# Patient Record
Sex: FEMALE | Race: OTHER | HISPANIC OR LATINO | URBAN - METROPOLITAN AREA
[De-identification: names, ages, dates, MRNs, and addresses within clinical notes are randomized per-mention and may not be internally consistent; named-entity substitution may affect disease eponyms.]

---

## 2018-04-26 VITALS
TEMPERATURE: 98 F | HEART RATE: 64 BPM | OXYGEN SATURATION: 97 % | SYSTOLIC BLOOD PRESSURE: 156 MMHG | RESPIRATION RATE: 18 BRPM | DIASTOLIC BLOOD PRESSURE: 56 MMHG

## 2018-04-26 NOTE — ED PROVIDER NOTE - MEDICAL DECISION MAKING DETAILS
67F presenting with slurred speech and left sided weakness and numbness since yesterday 8pm, will get basics, ekg, CT head and neuro consult, likely MRI/ CDU vs. admit.

## 2018-04-26 NOTE — ED PROVIDER NOTE - OBJECTIVE STATEMENT
67F presenting with left sided weakness and numbness since last night 8pm. Daughter who helped translate for the patient stated that pt was slurring speech when she called her mother at 8pm today. Per patient, sx started since yesterday night. Endorsed to heavy tongue, left sided facial and LE and UE numbness with drooling. Sxs have improved since yesterday but still has weakness. No fever or chills, no headache, blurry vision, no change in mental status.

## 2018-04-26 NOTE — ED PROVIDER NOTE - PROGRESS NOTE DETAILS
Ariana Velazquez MD PGY4  I was called to evaluate patient for stroke. hx of HTN, DM on ASA p/w left sided numbness since yesterday at 8pm. patient noted to have decreased sensation over left upper and lower extremity and left face. Noted to have slight left lower facial weakness. 5/5 strength in upper and lower extermities. Patient stable and mentating well. Episode occurred approx 24 hours ago and out of window. Code stroke was not called Neuro aware.

## 2018-04-26 NOTE — ED PROVIDER NOTE - PHYSICAL EXAMINATION
Focal weakness in the left UE and LE  Mild pronator drift on the left  No nystagmus, finger to nose in tact.

## 2018-04-26 NOTE — ED ADULT TRIAGE NOTE - CHIEF COMPLAINT QUOTE
pt. c/o L sided body weakness since about 8pm last night, reports decreased sensation to the SUDHA and LL extremities, as per daughter, pt.'s speech was slurred at approximately 8pm tonight, has since resolved.  Stroke eval conducted by MD Velazquez, no code stroke called.  PMHx HTN, diverticulitis, DM ().

## 2018-04-26 NOTE — ED PROVIDER NOTE - ATTENDING CONTRIBUTION TO CARE
Locurto  pt with c/o left sided weakness/numbness which began last night  persisting today though not as  severe today  Has also noted intermittent slurring of speech today  no assoc CP or SOB  had mild HA   exam  pt alert fluent speech  mild drift LUE and LLE  decreased tactile sensation on left  able to perform cerebeallar b/l no gross visual field defect CN  intact  plan CT head neuro to see   Presently not TPA (sxs improving  and about 1 day from onset)

## 2018-04-27 ENCOUNTER — INPATIENT (INPATIENT)
Facility: HOSPITAL | Age: 68
LOS: 10 days | Discharge: NURSING, REHAB OR LONG-TERM CARE | End: 2018-05-08
Attending: HOSPITALIST | Admitting: HOSPITALIST
Payer: MEDICAID

## 2018-04-27 DIAGNOSIS — E11.9 TYPE 2 DIABETES MELLITUS WITHOUT COMPLICATIONS: ICD-10-CM

## 2018-04-27 DIAGNOSIS — F03.90 UNSPECIFIED DEMENTIA WITHOUT BEHAVIORAL DISTURBANCE: ICD-10-CM

## 2018-04-27 DIAGNOSIS — I10 ESSENTIAL (PRIMARY) HYPERTENSION: ICD-10-CM

## 2018-04-27 DIAGNOSIS — Z29.9 ENCOUNTER FOR PROPHYLACTIC MEASURES, UNSPECIFIED: ICD-10-CM

## 2018-04-27 DIAGNOSIS — F32.9 MAJOR DEPRESSIVE DISORDER, SINGLE EPISODE, UNSPECIFIED: ICD-10-CM

## 2018-04-27 DIAGNOSIS — R53.1 WEAKNESS: ICD-10-CM

## 2018-04-27 DIAGNOSIS — Z90.49 ACQUIRED ABSENCE OF OTHER SPECIFIED PARTS OF DIGESTIVE TRACT: Chronic | ICD-10-CM

## 2018-04-27 LAB
ALBUMIN SERPL ELPH-MCNC: 3.4 G/DL — SIGNIFICANT CHANGE UP (ref 3.3–5)
ALP SERPL-CCNC: 164 U/L — HIGH (ref 40–120)
ALT FLD-CCNC: 15 U/L — SIGNIFICANT CHANGE UP (ref 4–33)
APTT BLD: 28.4 SEC — SIGNIFICANT CHANGE UP (ref 27.5–37.4)
AST SERPL-CCNC: 24 U/L — SIGNIFICANT CHANGE UP (ref 4–32)
BASOPHILS # BLD AUTO: 0.12 K/UL — SIGNIFICANT CHANGE UP (ref 0–0.2)
BASOPHILS # BLD AUTO: 0.16 K/UL — SIGNIFICANT CHANGE UP (ref 0–0.2)
BASOPHILS NFR BLD AUTO: 1.3 % — SIGNIFICANT CHANGE UP (ref 0–2)
BASOPHILS NFR BLD AUTO: 1.5 % — SIGNIFICANT CHANGE UP (ref 0–2)
BILIRUB SERPL-MCNC: 0.4 MG/DL — SIGNIFICANT CHANGE UP (ref 0.2–1.2)
BUN SERPL-MCNC: 10 MG/DL — SIGNIFICANT CHANGE UP (ref 7–23)
BUN SERPL-MCNC: 12 MG/DL — SIGNIFICANT CHANGE UP (ref 7–23)
CALCIUM SERPL-MCNC: 9 MG/DL — SIGNIFICANT CHANGE UP (ref 8.4–10.5)
CALCIUM SERPL-MCNC: 9.1 MG/DL — SIGNIFICANT CHANGE UP (ref 8.4–10.5)
CHLORIDE SERPL-SCNC: 93 MMOL/L — LOW (ref 98–107)
CHLORIDE SERPL-SCNC: 96 MMOL/L — LOW (ref 98–107)
CHOLEST SERPL-MCNC: 140 MG/DL — SIGNIFICANT CHANGE UP (ref 120–199)
CK MB BLD-MCNC: 1.78 NG/ML — SIGNIFICANT CHANGE UP (ref 1–4.7)
CK MB BLD-MCNC: SIGNIFICANT CHANGE UP (ref 0–2.5)
CK SERPL-CCNC: 38 U/L — SIGNIFICANT CHANGE UP (ref 25–170)
CO2 SERPL-SCNC: 26 MMOL/L — SIGNIFICANT CHANGE UP (ref 22–31)
CO2 SERPL-SCNC: 30 MMOL/L — SIGNIFICANT CHANGE UP (ref 22–31)
CREAT SERPL-MCNC: 0.76 MG/DL — SIGNIFICANT CHANGE UP (ref 0.5–1.3)
CREAT SERPL-MCNC: 0.82 MG/DL — SIGNIFICANT CHANGE UP (ref 0.5–1.3)
EOSINOPHIL # BLD AUTO: 1.17 K/UL — HIGH (ref 0–0.5)
EOSINOPHIL # BLD AUTO: 1.26 K/UL — HIGH (ref 0–0.5)
EOSINOPHIL NFR BLD AUTO: 11.6 % — HIGH (ref 0–6)
EOSINOPHIL NFR BLD AUTO: 12.9 % — HIGH (ref 0–6)
GLUCOSE SERPL-MCNC: 186 MG/DL — HIGH (ref 70–99)
GLUCOSE SERPL-MCNC: 232 MG/DL — HIGH (ref 70–99)
HBA1C BLD-MCNC: 9.1 % — HIGH (ref 4–5.6)
HCT VFR BLD CALC: 44 % — SIGNIFICANT CHANGE UP (ref 34.5–45)
HCT VFR BLD CALC: 45 % — SIGNIFICANT CHANGE UP (ref 34.5–45)
HDLC SERPL-MCNC: 37 MG/DL — LOW (ref 45–65)
HGB BLD-MCNC: 14.7 G/DL — SIGNIFICANT CHANGE UP (ref 11.5–15.5)
HGB BLD-MCNC: 15 G/DL — SIGNIFICANT CHANGE UP (ref 11.5–15.5)
IMM GRANULOCYTES # BLD AUTO: 0.02 # — SIGNIFICANT CHANGE UP
IMM GRANULOCYTES # BLD AUTO: 0.02 # — SIGNIFICANT CHANGE UP
IMM GRANULOCYTES NFR BLD AUTO: 0.2 % — SIGNIFICANT CHANGE UP (ref 0–1.5)
IMM GRANULOCYTES NFR BLD AUTO: 0.2 % — SIGNIFICANT CHANGE UP (ref 0–1.5)
INR BLD: 1.01 — SIGNIFICANT CHANGE UP (ref 0.88–1.17)
LIPID PNL WITH DIRECT LDL SERPL: 87 MG/DL — SIGNIFICANT CHANGE UP
LYMPHOCYTES # BLD AUTO: 29.8 % — SIGNIFICANT CHANGE UP (ref 13–44)
LYMPHOCYTES # BLD AUTO: 3.03 K/UL — SIGNIFICANT CHANGE UP (ref 1–3.3)
LYMPHOCYTES # BLD AUTO: 3.25 K/UL — SIGNIFICANT CHANGE UP (ref 1–3.3)
LYMPHOCYTES # BLD AUTO: 33.4 % — SIGNIFICANT CHANGE UP (ref 13–44)
MCHC RBC-ENTMCNC: 28.9 PG — SIGNIFICANT CHANGE UP (ref 27–34)
MCHC RBC-ENTMCNC: 28.9 PG — SIGNIFICANT CHANGE UP (ref 27–34)
MCHC RBC-ENTMCNC: 33.3 % — SIGNIFICANT CHANGE UP (ref 32–36)
MCHC RBC-ENTMCNC: 33.4 % — SIGNIFICANT CHANGE UP (ref 32–36)
MCV RBC AUTO: 86.4 FL — SIGNIFICANT CHANGE UP (ref 80–100)
MCV RBC AUTO: 86.7 FL — SIGNIFICANT CHANGE UP (ref 80–100)
MONOCYTES # BLD AUTO: 0.66 K/UL — SIGNIFICANT CHANGE UP (ref 0–0.9)
MONOCYTES # BLD AUTO: 1.07 K/UL — HIGH (ref 0–0.9)
MONOCYTES NFR BLD AUTO: 7.3 % — SIGNIFICANT CHANGE UP (ref 2–14)
MONOCYTES NFR BLD AUTO: 9.8 % — SIGNIFICANT CHANGE UP (ref 2–14)
NEUTROPHILS # BLD AUTO: 4.08 K/UL — SIGNIFICANT CHANGE UP (ref 1.8–7.4)
NEUTROPHILS # BLD AUTO: 5.13 K/UL — SIGNIFICANT CHANGE UP (ref 1.8–7.4)
NEUTROPHILS NFR BLD AUTO: 44.9 % — SIGNIFICANT CHANGE UP (ref 43–77)
NEUTROPHILS NFR BLD AUTO: 47.1 % — SIGNIFICANT CHANGE UP (ref 43–77)
NRBC # FLD: 0 — SIGNIFICANT CHANGE UP
NRBC # FLD: 0 — SIGNIFICANT CHANGE UP
PLATELET # BLD AUTO: 187 K/UL — SIGNIFICANT CHANGE UP (ref 150–400)
PLATELET # BLD AUTO: 194 K/UL — SIGNIFICANT CHANGE UP (ref 150–400)
PMV BLD: 10.5 FL — SIGNIFICANT CHANGE UP (ref 7–13)
PMV BLD: 10.5 FL — SIGNIFICANT CHANGE UP (ref 7–13)
POTASSIUM SERPL-MCNC: 4.2 MMOL/L — SIGNIFICANT CHANGE UP (ref 3.5–5.3)
POTASSIUM SERPL-MCNC: 4.4 MMOL/L — SIGNIFICANT CHANGE UP (ref 3.5–5.3)
POTASSIUM SERPL-SCNC: 4.2 MMOL/L — SIGNIFICANT CHANGE UP (ref 3.5–5.3)
POTASSIUM SERPL-SCNC: 4.4 MMOL/L — SIGNIFICANT CHANGE UP (ref 3.5–5.3)
PROT SERPL-MCNC: 6.9 G/DL — SIGNIFICANT CHANGE UP (ref 6–8.3)
PROTHROM AB SERPL-ACNC: 11.2 SEC — SIGNIFICANT CHANGE UP (ref 9.8–13.1)
RBC # BLD: 5.09 M/UL — SIGNIFICANT CHANGE UP (ref 3.8–5.2)
RBC # BLD: 5.19 M/UL — SIGNIFICANT CHANGE UP (ref 3.8–5.2)
RBC # FLD: 11.6 % — SIGNIFICANT CHANGE UP (ref 10.3–14.5)
RBC # FLD: 11.7 % — SIGNIFICANT CHANGE UP (ref 10.3–14.5)
SODIUM SERPL-SCNC: 134 MMOL/L — LOW (ref 135–145)
SODIUM SERPL-SCNC: 138 MMOL/L — SIGNIFICANT CHANGE UP (ref 135–145)
TRIGL SERPL-MCNC: 138 MG/DL — SIGNIFICANT CHANGE UP (ref 10–149)
TROPONIN T SERPL-MCNC: < 0.06 NG/ML — SIGNIFICANT CHANGE UP (ref 0–0.06)
TROPONIN T SERPL-MCNC: < 0.06 NG/ML — SIGNIFICANT CHANGE UP (ref 0–0.06)
TSH SERPL-MCNC: 3.06 UIU/ML — SIGNIFICANT CHANGE UP (ref 0.27–4.2)
WBC # BLD: 10.89 K/UL — HIGH (ref 3.8–10.5)
WBC # BLD: 9.08 K/UL — SIGNIFICANT CHANGE UP (ref 3.8–10.5)
WBC # FLD AUTO: 10.89 K/UL — HIGH (ref 3.8–10.5)
WBC # FLD AUTO: 9.08 K/UL — SIGNIFICANT CHANGE UP (ref 3.8–10.5)

## 2018-04-27 PROCEDURE — 70450 CT HEAD/BRAIN W/O DYE: CPT | Mod: 26

## 2018-04-27 PROCEDURE — 99223 1ST HOSP IP/OBS HIGH 75: CPT

## 2018-04-27 RX ORDER — DEXTROSE 50 % IN WATER 50 %
25 SYRINGE (ML) INTRAVENOUS ONCE
Qty: 0 | Refills: 0 | Status: DISCONTINUED | OUTPATIENT
Start: 2018-04-27 | End: 2018-05-08

## 2018-04-27 RX ORDER — INSULIN LISPRO 100/ML
18 VIAL (ML) SUBCUTANEOUS
Qty: 0 | Refills: 0 | Status: DISCONTINUED | OUTPATIENT
Start: 2018-04-27 | End: 2018-04-30

## 2018-04-27 RX ORDER — ENOXAPARIN SODIUM 100 MG/ML
40 INJECTION SUBCUTANEOUS EVERY 24 HOURS
Qty: 0 | Refills: 0 | Status: DISCONTINUED | OUTPATIENT
Start: 2018-04-27 | End: 2018-04-27

## 2018-04-27 RX ORDER — SODIUM CHLORIDE 9 MG/ML
1000 INJECTION, SOLUTION INTRAVENOUS
Qty: 0 | Refills: 0 | Status: DISCONTINUED | OUTPATIENT
Start: 2018-04-27 | End: 2018-05-08

## 2018-04-27 RX ORDER — ATORVASTATIN CALCIUM 80 MG/1
80 TABLET, FILM COATED ORAL AT BEDTIME
Qty: 0 | Refills: 0 | Status: DISCONTINUED | OUTPATIENT
Start: 2018-04-27 | End: 2018-05-08

## 2018-04-27 RX ORDER — INSULIN LISPRO 100/ML
VIAL (ML) SUBCUTANEOUS
Qty: 0 | Refills: 0 | Status: DISCONTINUED | OUTPATIENT
Start: 2018-04-27 | End: 2018-05-02

## 2018-04-27 RX ORDER — LISINOPRIL 2.5 MG/1
1 TABLET ORAL
Qty: 0 | Refills: 0 | COMMUNITY

## 2018-04-27 RX ORDER — INSULIN GLARGINE 100 [IU]/ML
40 INJECTION, SOLUTION SUBCUTANEOUS AT BEDTIME
Qty: 0 | Refills: 0 | Status: DISCONTINUED | OUTPATIENT
Start: 2018-04-27 | End: 2018-05-03

## 2018-04-27 RX ORDER — DEXTROSE 50 % IN WATER 50 %
12.5 SYRINGE (ML) INTRAVENOUS ONCE
Qty: 0 | Refills: 0 | Status: DISCONTINUED | OUTPATIENT
Start: 2018-04-27 | End: 2018-05-08

## 2018-04-27 RX ORDER — DONEPEZIL HYDROCHLORIDE 10 MG/1
10 TABLET, FILM COATED ORAL AT BEDTIME
Qty: 0 | Refills: 0 | Status: DISCONTINUED | OUTPATIENT
Start: 2018-04-27 | End: 2018-05-08

## 2018-04-27 RX ORDER — DONEPEZIL HYDROCHLORIDE 10 MG/1
1 TABLET, FILM COATED ORAL
Qty: 0 | Refills: 0 | COMMUNITY

## 2018-04-27 RX ORDER — DEXTROSE 50 % IN WATER 50 %
1 SYRINGE (ML) INTRAVENOUS ONCE
Qty: 0 | Refills: 0 | Status: DISCONTINUED | OUTPATIENT
Start: 2018-04-27 | End: 2018-05-08

## 2018-04-27 RX ORDER — INSULIN LISPRO 100/ML
VIAL (ML) SUBCUTANEOUS AT BEDTIME
Qty: 0 | Refills: 0 | Status: DISCONTINUED | OUTPATIENT
Start: 2018-04-27 | End: 2018-05-05

## 2018-04-27 RX ORDER — SERTRALINE 25 MG/1
100 TABLET, FILM COATED ORAL DAILY
Qty: 0 | Refills: 0 | Status: DISCONTINUED | OUTPATIENT
Start: 2018-04-27 | End: 2018-05-08

## 2018-04-27 RX ORDER — ACETAMINOPHEN 500 MG
650 TABLET ORAL EVERY 6 HOURS
Qty: 0 | Refills: 0 | Status: DISCONTINUED | OUTPATIENT
Start: 2018-04-27 | End: 2018-05-08

## 2018-04-27 RX ORDER — ASPIRIN/CALCIUM CARB/MAGNESIUM 324 MG
81 TABLET ORAL DAILY
Qty: 0 | Refills: 0 | Status: DISCONTINUED | OUTPATIENT
Start: 2018-04-27 | End: 2018-05-08

## 2018-04-27 RX ORDER — GLUCAGON INJECTION, SOLUTION 0.5 MG/.1ML
1 INJECTION, SOLUTION SUBCUTANEOUS ONCE
Qty: 0 | Refills: 0 | Status: DISCONTINUED | OUTPATIENT
Start: 2018-04-27 | End: 2018-05-08

## 2018-04-27 RX ORDER — CLOPIDOGREL BISULFATE 75 MG/1
75 TABLET, FILM COATED ORAL DAILY
Qty: 0 | Refills: 0 | Status: DISCONTINUED | OUTPATIENT
Start: 2018-04-27 | End: 2018-05-08

## 2018-04-27 RX ORDER — SERTRALINE 25 MG/1
1 TABLET, FILM COATED ORAL
Qty: 0 | Refills: 0 | COMMUNITY

## 2018-04-27 RX ORDER — INSULIN GLARGINE 100 [IU]/ML
50 INJECTION, SOLUTION SUBCUTANEOUS AT BEDTIME
Qty: 0 | Refills: 0 | Status: DISCONTINUED | OUTPATIENT
Start: 2018-04-27 | End: 2018-04-27

## 2018-04-27 RX ADMIN — ATORVASTATIN CALCIUM 80 MILLIGRAM(S): 80 TABLET, FILM COATED ORAL at 22:33

## 2018-04-27 RX ADMIN — Medication 18 UNIT(S): at 19:13

## 2018-04-27 RX ADMIN — DONEPEZIL HYDROCHLORIDE 10 MILLIGRAM(S): 10 TABLET, FILM COATED ORAL at 22:33

## 2018-04-27 RX ADMIN — INSULIN GLARGINE 40 UNIT(S): 100 INJECTION, SOLUTION SUBCUTANEOUS at 22:33

## 2018-04-27 RX ADMIN — Medication 12: at 19:13

## 2018-04-27 RX ADMIN — ENOXAPARIN SODIUM 40 MILLIGRAM(S): 100 INJECTION SUBCUTANEOUS at 08:51

## 2018-04-27 NOTE — H&P ADULT - PROBLEM SELECTOR PLAN 1
Admit to telemetry, serial EKG's, echo, MRI, FLP, HgA1C. Continue aspirin and statin. Consult neurology. Admit to telemetry, serial EKG's, echo, MRI, FLP, HgA1C. Continue aspirin and statin. Consult neurology. Permissive HTN Admit to telemetry  Serial EKG's,   TTE w/ bubble study ordered and pending  F/u pending MRI   Continue aspirin and statin.   Permissive HTN  F/u pending MRI  F/u neurology recs Admit to telemetry  Serial EKGs,   TTE w/ bubble study ordered and pending  F/u pending MRI   Continue aspirin and statin.   Permissive HTN  F/u pending MRI  F/u neurology recs

## 2018-04-27 NOTE — H&P ADULT - NSHPSOCIALHISTORY_GEN_ALL_CORE
Pt lives by herself. She has never smoked, denies EtOH, and denies illicit drug use. Pt states she does not use a walker or cane to get around and has no problems with activities of daily living.

## 2018-04-27 NOTE — H&P ADULT - NEGATIVE GASTROINTESTINAL SYMPTOMS
no change in bowel habits/no hematochezia/no diarrhea/no melena/no nausea/no vomiting/no constipation/no abdominal pain

## 2018-04-27 NOTE — H&P ADULT - ASSESSMENT
Pt is a 68 yo F w/ PMHx of DM Type II and HTN p/w left sided weakness and numbness x 2 days. EKG - NSR @ 63 bpm. Admit to telemetry to r/o CVA vs. r/o TIA. 66 y/o Female, with a PmHx of DM Type II, HTN, mild Dementia, Depression, p/w left sided weakness and numbness x 2 days. EKG - NSR @ 63 bpm. Admit to telemetry to r/o CVA. 67 year old woman PMH of insulin dependent DM Type II, HTN, p/w slurred speech left sided weakness and numbness x 2 days. Pt admitted for evaluation of possible CVA

## 2018-04-27 NOTE — H&P ADULT - FAMILY HISTORY
Sibling  Still living? Unknown  Family history of stroke (cerebrovascular), Age at diagnosis: Age Unknown

## 2018-04-27 NOTE — H&P ADULT - PMH
DM (diabetes mellitus)    HTN (hypertension) Depression    DM (diabetes mellitus)    HTN (hypertension)    Mild dementia

## 2018-04-27 NOTE — H&P ADULT - PROBLEM SELECTOR PLAN 2
Monitor blood glucose, HgA1C, continue novolog and basagular. Diabetic diet Monitor blood glucose, HgA1C, continue pre-meal and basal insuln.   Diabetic diet Hgba1c 9.1 uncontrolled  Monitor blood glucose  Continue basal insulin - will reduce initially  Continue pre-meal  ISS for coverage  Diabetic diet Hgba1c 9.1 uncontrolled. Pt has hx of diabetes complicated by retinopathy  Monitor blood glucose  Continue basal insulin - will reduce initially  Continue pre-meal  ISS for coverage  Diabetic diet Hgba1c 9.1 uncontrolled. Pt has hx of diabetes complicated by retinopathy  Monitor blood glucose  Continue basal insulin - will reduce from h ome dose 50 units to 40 units reduce the risk of hypoglycemia. Plan to uptitrate to home regimen as pt eats more  Continue pre-meal  ISS for coverage  Diabetic diet

## 2018-04-27 NOTE — OCCUPATIONAL THERAPY INITIAL EVALUATION ADULT - PERTINENT HX OF CURRENT PROBLEM, REHAB EVAL
67 year old woman PMH of insulin dependent DM Type II, HTN, p/w slurred speech left sided weakness and numbness x 2 days. Pt admitted for evaluation of possible CVA

## 2018-04-27 NOTE — H&P ADULT - RS GEN PE MLT RESP DETAILS PC
no intercostal retractions/no rales/respirations non-labored/airway patent/breath sounds equal/no wheezes/no chest wall tenderness/good air movement

## 2018-04-27 NOTE — PATIENT PROFILE ADULT. - PATIENT/CAREGIVER ACCEPTED INTERPRETER SERVICES

## 2018-04-27 NOTE — PHYSICAL THERAPY INITIAL EVALUATION ADULT - PERTINENT HX OF CURRENT PROBLEM, REHAB EVAL
68 yo Sami speaking woman PMH of insulin dependent DM Type II, HTN, mild Dementia, Depression, p/w slurred speechm left sided weakness and numbness x 2 days.

## 2018-04-27 NOTE — OCCUPATIONAL THERAPY INITIAL EVALUATION ADULT - PLANNED THERAPY INTERVENTIONS, OT EVAL
motor coordination training/transfer training/ADL retraining/bed mobility training/parent/caregiver training.../strengthening/balance training/neuromuscular re-education

## 2018-04-27 NOTE — H&P ADULT - NSHPPHYSICALEXAM_GEN_ALL_CORE
Vital Signs Last 24 Hrs  T(C): 36.9 (27 Apr 2018 12:31), Max: 37.1 (27 Apr 2018 08:29)  T(F): 98.4 (27 Apr 2018 12:31), Max: 98.7 (27 Apr 2018 08:29)  HR: 62 (27 Apr 2018 12:31) (60 - 66)  BP: 200/72 (27 Apr 2018 12:31) (146/54 - 200/72)  BP(mean): --  RR: 16 (27 Apr 2018 12:31) (16 - 18)  SpO2: 97% (27 Apr 2018 12:31) (96% - 98%)    EKG: NSR @ 63

## 2018-04-27 NOTE — H&P ADULT - GASTROINTESTINAL DETAILS
bowel sounds normal/no rigidity/no distention/soft/no rebound tenderness/normal/no guarding/nontender

## 2018-04-27 NOTE — H&P ADULT - PROBLEM SELECTOR PLAN 3
Monitor blood pressure. Permissive HTN unless 220/110 Monitor blood pressure. Permissive HTN unless 220/110  Plan to resume home meds and uptitrate as needed  Will resume ACEI last Monitor blood pressure. Permissive HTN unless greater than or equal to 220/110  Plan to resume home meds and uptitrate as needed  Will resume ACEI last

## 2018-04-27 NOTE — H&P ADULT - MUSCULOSKELETAL
details… detailed exam no joint erythema/ROM intact/no joint swelling/no joint warmth/no calf tenderness/diminished strength

## 2018-04-27 NOTE — H&P ADULT - NEGATIVE GENERAL SYMPTOMS
no fever/no anorexia/no chills/no sweating/no weight loss/no weight gain no fever/no weight gain/no malaise/no chills/no sweating/no anorexia/no weight loss/no fatigue

## 2018-04-27 NOTE — H&P ADULT - NEUROLOGICAL SYMPTOMS
Parethesias of left leg, left arm, left face/loss of sensation/facial palsy/weakness/paresthesias/difficulty walking weakness/loss of sensation/paresthesias/difficulty walking/facial palsy/Paresthesia of left leg, left arm, left face

## 2018-04-27 NOTE — CONSULT NOTE ADULT - ATTENDING COMMENTS
Agree with above; ROS otherwise negative. Additional exam findings: Moderately decreased light touch on left side. MRI brain (4/27/18) to my eye showed an acute small infarct in the right basis pontis, extending to the ventral border of the louis. MRA neck (4/27/18) to my eye showed decreased flow signal throughout the right vertebral artery, consistent with either hypoplasia versus a proximal stenosis. MRA brain (4/27/18) to my eye showed loss of flow signal in the distal right vertebral artery. Impression. 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 Suggest. Elective TTE as inpatient or outpatient; Plavix for 3 months; aspirin indefinitely; atorvastatin 80 mg daily; aggressive cardiovascular risk factor management;; PT/OT evaluations; PM&R evaluation;  from neurovascular standpoint, cleared for discharge

## 2018-04-27 NOTE — CONSULT NOTE ADULT - SUBJECTIVE AND OBJECTIVE BOX
HPI:  The patient is a 66 yo f with PMhx of           MEDICATIONS  (STANDING):    MEDICATIONS  (PRN):    PAST MEDICAL & SURGICAL HISTORY:  DM (diabetes mellitus)  HTN (hypertension)  No significant past surgical history    FAMILY HISTORY:    Allergies    No Known Allergies    Intolerances        SHx - No smoking, No ETOH, No drug abuse    Review of Systems:  CONSTITUTIONAL:  No weight loss, fever, chills, weakness or fatigue.  HEENT:  Eyes:  No visual loss, blurred vision, double vision or yellow sclerae. Ears, Nose, Throat:  No hearing loss, sneezing, congestion, runny nose or sore throat.  CARDIOVASCULAR:  No chest pain, chest pressure or chest discomfort. No palpitations or edema.  GASTROINTESTINAL:  No anorexia, nausea, vomiting or diarrhea. No abdominal pain or blood.  NEUROLOGICAL: See HPI  MUSCULOSKELETAL:  No muscle, back pain, joint pain or stiffness.  PSYCHIATRIC:  No history of depression or anxiety.      Vital Signs Last 24 Hrs  T(C): 36.9 (26 Apr 2018 21:26), Max: 36.9 (26 Apr 2018 21:26)  T(F): 98.5 (26 Apr 2018 21:26), Max: 98.5 (26 Apr 2018 21:26)  HR: 60 (27 Apr 2018 01:29) (60 - 64)  BP: 146/54 (27 Apr 2018 01:29) (146/54 - 156/56)  BP(mean): --  RR: 18 (27 Apr 2018 01:29) (18 - 18)  SpO2: 98% (27 Apr 2018 01:29) (97% - 98%)    General Exam:   General appearance: No acute distress                   Neurological Exam:  Mental Status: Orientated to self, date and place.    No dysarthria, aphasia or neglect.      Cranial Nerves: CN I - not tested.  PERRL, EOMI, VFF, no nystagmus or diplopia.  No APD.    Fundi not visualized bilaterally.    No facial asymmetry.  Hearing intact to finger rub bilaterally.     Motor:   Tone: normal.                  Strength:     [] Upper extremity                      Delt       Bicep    Tricep                                                  R         5/5        5/5        5/5       5/5                                               L          5/5        5/5        5/5       5/5  [] Lower extremity                       HF          KE          KF        DF         PF                                               R        5/5        5/5        5/5       5/5       5/5                                               L         5/5        5/5       5/5       5/5        5/5  Pronator drift: none                 Dysmetria: BL NL FTN  No truncal ataxia.    Tremor: No resting, postural or action tremor.  No myoclonus.    Sensation: intact to light touch, pinprick, vibration and proprioception    Deep Tendon Reflexes:   Right 2+ : BC, TC, BRD   Left 2+ : BC, TC, BRD  Right 2+ Knee, 1+ ankle  Left 2+ Knee, 1+ ankle    Toes flexor bilaterally  Gait: normal and stable.      Other:    04-26    134<L>  |  93<L>  |  12  ----------------------------<  186<H>  4.4   |  26  |  0.82    Ca    9.1      26 Apr 2018 23:16    TPro  6.9  /  Alb  3.4  /  TBili  0.4  /  DBili  x   /  AST  24  /  ALT  15  /  AlkPhos  164<H>  04-26                          15.0   10.89 )-----------( 194      ( 26 Apr 2018 23:16 )             45.0       Radiology    CT  MRI  EKG:  tele:  TTE:  EEG: HPI:  The patient is a 68 yo f with PMhx of DM and HTN presented with 2 days of left sided weakness and left sided numbness. She is Burmese speaking, here with her daughter who provides translation. She states 2 days ago she had a sudden onset of left sided weakness, and drooling from the left side of the mouth, does not seek medical care until yesterday when daughter calls her and notices a severe slurred speech and they brought her to [facility]. She states her slurred speech is significantly improved now but she still has some residual left sided weakness as well as left sided of the body, and face numbness. She denies vertigo, double vision, visual disturbances.    MEDICATIONS  (STANDING):    MEDICATIONS  (PRN):    PAST MEDICAL & SURGICAL HISTORY:  DM (diabetes mellitus)  HTN (hypertension)  No significant past surgical history    FAMILY HISTORY:    Allergies    No Known Allergies    Intolerances    SHx - No smoking, No ETOH, No drug abuse    Review of Systems:  CONSTITUTIONAL:  No weight loss, fever, chills, weakness or fatigue.  HEENT:  Eyes:  No visual loss, blurred vision, double vision or yellow sclerae. Ears, Nose, Throat:  No hearing loss, sneezing, congestion, runny nose or sore throat.  CARDIOVASCULAR:  No chest pain, chest pressure or chest discomfort. No palpitations or edema.  GASTROINTESTINAL:  No anorexia, nausea, vomiting or diarrhea. No abdominal pain or blood.  NEUROLOGICAL: See HPI  MUSCULOSKELETAL:  No muscle, back pain, joint pain or stiffness.  PSYCHIATRIC:  No history of depression or anxiety.      Vital Signs Last 24 Hrs  T(C): 36.9 (26 Apr 2018 21:26), Max: 36.9 (26 Apr 2018 21:26)  T(F): 98.5 (26 Apr 2018 21:26), Max: 98.5 (26 Apr 2018 21:26)  HR: 60 (27 Apr 2018 01:29) (60 - 64)  BP: 146/54 (27 Apr 2018 01:29) (146/54 - 156/56)  BP(mean): --  RR: 18 (27 Apr 2018 01:29) (18 - 18)  SpO2: 98% (27 Apr 2018 01:29) (97% - 98%)    General Exam:   General appearance: No acute distress                   Neurological Exam:  Mental Status: Orientated to self, date and place.    No dysarthria, aphasia or neglect.      Cranial Nerves: CN I - not tested.  PERRL, EOMI, VFF, no nystagmus or diplopia.  No APD.    Fundi not visualized bilaterally.    No facial asymmetry.  Hearing intact to finger rub bilaterally.     Motor:   Tone: normal.                  Strength:     [] Upper extremity                      Delt       Bicep    Tricep                                                  R         5/5        5/5        5/5       5/5                                               L          4+/5        5/5        4/5       4/5  [] Lower extremity                                                                      R       no drift                                               L         4/5   with some drift  Pronator drift: left side              Dysmetria: BL NL FTN  No truncal ataxia.    Tremor: No resting, postural or action tremor.  No myoclonus.    Sensation: intact to light touch, no extinction.    Other:    04-26    134<L>  |  93<L>  |  12  ----------------------------<  186<H>  4.4   |  26  |  0.82    Ca    9.1      26 Apr 2018 23:16    TPro  6.9  /  Alb  3.4  /  TBili  0.4  /  DBili  x   /  AST  24  /  ALT  15  /  AlkPhos  164<H>  04-26                          15.0   10.89 )-----------( 194      ( 26 Apr 2018 23:16 )             45.0       Radiology  < from: CT Head No Cont (04.27.18 @ 01:52) >  IMPRESSION:    No acute intracranial hemorrhage, large cortical infarct or mass effect.   Additional findings as described. If there is continued clinical   suspicion for acute infarct, MRI may be obtained for further evaluation.    Nonspecific right mastoid opacification. Recommend clinical question to   assess acute mastoiditis.    < end of copied text >

## 2018-04-27 NOTE — CONSULT NOTE ADULT - ASSESSMENT
66 yo f with PMhx of DM and HTN presented with 2 days of left sided weakness and left sided numbness. She is French speaking, here with her daughter who provides translation. She states 2 days ago she had a sudden onset of left sided weakness, and drooling from the left side of the mouth, does not seek medical care until yesterday when daughter calls her and notices a severe slurred speech and they brought her to [facility]. She states her slurred speech is significantly improved now but she still has some residual left sided weakness as well as left sided of the body, and face numbness. She denies vertigo, double vision, visual disturbances. Exam showed left sided weakness 4/5, left UE pronator drift, with no gross sensory abnormality. MIHSS 2 MRS 1  Impression: Left sided hemiparesis and hemisensory can be seen in large right hemispheric strokes which given normal CT after 48 hours is not the case in this patient. Embolic showers and borderzone infarcts can cause dispersed symptoms.  Plan:  [] MRI brain  [] MRA H&N  [] Permissive HTN up to 220/110  [] TTE +/- Loop  [] HBA1C, Lipid panel  [] Risk factor control 66 yo hyspanic f with PMhx of DM and HTN presented with 2 days of left sided weakness and left sided numbness. She is Ghanaian speaking, here with her daughter who provides translation. She states 2 days ago she had a sudden onset of left sided weakness, and drooling from the left side of the mouth, does not seek medical care until yesterday when daughter calls her and notices a severe slurred speech and they brought her to [facility]. She states her slurred speech is significantly improved now but she still has some residual left sided weakness as well as left sided of the body, and face numbness. She denies vertigo, double vision, visual disturbances. Exam showed left sided weakness 4/5, left UE pronator drift, with no gross sensory abnormality. MIHSS 2 MRS 1  Impression: Left sided hemiparesis and hemisensory can be seen in large right hemispheric strokes which given normal CT after 48 hours is not the case in this patient. Embolic showers and borderzone infarcts can cause dispersed symptoms.  Plan:  [] MRI brain  [] MRA H&N  [] Permissive HTN up to 220/110  [] TTE +/- Loop  [] HBA1C, Lipid panel  [] Risk factor control

## 2018-04-27 NOTE — H&P ADULT - NSHPLABSRESULTS_GEN_ALL_CORE
Labs and imaging personally reviewed    Labs notable for:    elevated serum glucose  Hgba1c 9.1   , LDL 87 HDL 37  TSH WNL  troponin neg x2      Imaging reviewed  CT head- no acute findings  EKG - NSR @ 63 bpm

## 2018-04-27 NOTE — ED ADULT NURSE NOTE - OBJECTIVE STATEMENT
Patient presents today accompanied by daughter with complaints of weakness, slurred speech, drooling that has started since last night around 9 pm. On arrival, patient is alert and oriented x4, Chilean speaking, PERRLA, no slurred speech, no droop noted, respirations are even and unlabored, S1, S2 regular, abdomen is soft and nontender, no edema noted. IV inserted 22 gauge on left metacarpal, blood drawn and sent. will follow up.

## 2018-04-27 NOTE — H&P ADULT - NEGATIVE CARDIOVASCULAR SYMPTOMS
no palpitations/no dyspnea on exertion/no orthopnea/no chest pain/no paroxysmal nocturnal dyspnea/no peripheral edema

## 2018-04-27 NOTE — H&P ADULT - NEGATIVE ENMT SYMPTOMS
no nasal discharge/no hearing difficulty/no ear pain/no tinnitus/no vertigo no hearing difficulty/no vertigo/no sinus symptoms/no ear pain/no tinnitus/no nasal discharge

## 2018-04-27 NOTE — ED ADULT NURSE NOTE - CHPI ED SYMPTOMS NEG
no dizziness/no loss of consciousness/no nausea/no fever/no confusion/no blurred vision/no numbness/no change in level of consciousness/no vomiting

## 2018-04-28 LAB
BUN SERPL-MCNC: 15 MG/DL — SIGNIFICANT CHANGE UP (ref 7–23)
CALCIUM SERPL-MCNC: 9.2 MG/DL — SIGNIFICANT CHANGE UP (ref 8.4–10.5)
CHLORIDE SERPL-SCNC: 97 MMOL/L — LOW (ref 98–107)
CO2 SERPL-SCNC: 27 MMOL/L — SIGNIFICANT CHANGE UP (ref 22–31)
CREAT SERPL-MCNC: 0.78 MG/DL — SIGNIFICANT CHANGE UP (ref 0.5–1.3)
GLUCOSE SERPL-MCNC: 264 MG/DL — HIGH (ref 70–99)
HCT VFR BLD CALC: 43.9 % — SIGNIFICANT CHANGE UP (ref 34.5–45)
HGB BLD-MCNC: 14.7 G/DL — SIGNIFICANT CHANGE UP (ref 11.5–15.5)
MCHC RBC-ENTMCNC: 29 PG — SIGNIFICANT CHANGE UP (ref 27–34)
MCHC RBC-ENTMCNC: 33.5 % — SIGNIFICANT CHANGE UP (ref 32–36)
MCV RBC AUTO: 86.6 FL — SIGNIFICANT CHANGE UP (ref 80–100)
NRBC # FLD: 0 — SIGNIFICANT CHANGE UP
PLATELET # BLD AUTO: 183 K/UL — SIGNIFICANT CHANGE UP (ref 150–400)
PMV BLD: 10.5 FL — SIGNIFICANT CHANGE UP (ref 7–13)
POTASSIUM SERPL-MCNC: 4.2 MMOL/L — SIGNIFICANT CHANGE UP (ref 3.5–5.3)
POTASSIUM SERPL-SCNC: 4.2 MMOL/L — SIGNIFICANT CHANGE UP (ref 3.5–5.3)
RBC # BLD: 5.07 M/UL — SIGNIFICANT CHANGE UP (ref 3.8–5.2)
RBC # FLD: 11.8 % — SIGNIFICANT CHANGE UP (ref 10.3–14.5)
SODIUM SERPL-SCNC: 137 MMOL/L — SIGNIFICANT CHANGE UP (ref 135–145)
WBC # BLD: 9.41 K/UL — SIGNIFICANT CHANGE UP (ref 3.8–10.5)
WBC # FLD AUTO: 9.41 K/UL — SIGNIFICANT CHANGE UP (ref 3.8–10.5)

## 2018-04-28 PROCEDURE — 99233 SBSQ HOSP IP/OBS HIGH 50: CPT

## 2018-04-28 PROCEDURE — 70450 CT HEAD/BRAIN W/O DYE: CPT | Mod: 26

## 2018-04-28 RX ORDER — SODIUM CHLORIDE 9 MG/ML
1000 INJECTION INTRAMUSCULAR; INTRAVENOUS; SUBCUTANEOUS ONCE
Qty: 0 | Refills: 0 | Status: COMPLETED | OUTPATIENT
Start: 2018-04-28 | End: 2018-04-28

## 2018-04-28 RX ADMIN — Medication 81 MILLIGRAM(S): at 13:15

## 2018-04-28 RX ADMIN — SERTRALINE 100 MILLIGRAM(S): 25 TABLET, FILM COATED ORAL at 13:16

## 2018-04-28 RX ADMIN — Medication 18 UNIT(S): at 09:44

## 2018-04-28 RX ADMIN — ATORVASTATIN CALCIUM 80 MILLIGRAM(S): 80 TABLET, FILM COATED ORAL at 21:27

## 2018-04-28 RX ADMIN — INSULIN GLARGINE 40 UNIT(S): 100 INJECTION, SOLUTION SUBCUTANEOUS at 22:31

## 2018-04-28 RX ADMIN — DONEPEZIL HYDROCHLORIDE 10 MILLIGRAM(S): 10 TABLET, FILM COATED ORAL at 21:27

## 2018-04-28 RX ADMIN — Medication 4: at 09:45

## 2018-04-28 RX ADMIN — SODIUM CHLORIDE 500 MILLILITER(S): 9 INJECTION INTRAMUSCULAR; INTRAVENOUS; SUBCUTANEOUS at 15:17

## 2018-04-28 RX ADMIN — CLOPIDOGREL BISULFATE 75 MILLIGRAM(S): 75 TABLET, FILM COATED ORAL at 13:15

## 2018-04-28 RX ADMIN — Medication 4: at 22:15

## 2018-04-28 NOTE — CHART NOTE - NSCHARTNOTEFT_GEN_A_CORE
Called by RN pt fell in the rest room, pt reported going to the bathroom and when standing to put on her underwear noted left LE weakness and dizziness. She tried to find support and slowly slumped on the ground. Pt reported did not hit her head or anywhere else. VSS. S1S2 no murmur no gallops. Lung CTA bilaterally. left hand  weaker than right, left lower leg 2+. Orthostatic positive. Family at bedside also noted her with some slurred speech. Pt with MRI confirmed + stroke. dizziness likely associated with CVa and orthostatic. Will repeat CT head, given 1 L NS. will followup neuro recs. case discussed with attending    pt also had burst of PAT for few sec this morning but asymptomatic was resting in bed at the time. BP stable Called by RN pt fell in the rest room, pt reported going to the bathroom and when standing to put on her underwear noted left LE weakness and dizziness. She tried to find support and slowly slumped on the ground. Pt reported did not hit her head or anywhere else. VSS. S1S2 no murmur no gallops. Lung CTA bilaterally. left hand  weaker than right, left lower leg 2+. Orthostatic positive. Family at bedside also noted her with some slurred speech. Pt with MRI confirmed + stroke. dizziness likely associated with CVa and orthostatic. Will repeat CT head, given 1 L NS. will followup neuro recs. case discussed with attending    Spoke with neuro pt with stenosis on MRA, would prefer to keep BP around 180-200 for permissve HTN and slowly introduce BP meds tomorrow     pt also had burst of PAT for few sec this morning but asymptomatic was resting in bed at the time. BP stable

## 2018-04-28 NOTE — PROVIDER CONTACT NOTE (OTHER) - SITUATION
pt ambulated to bathroom with daughter, reports feeling L leg weak, unable to pull up underwear, self descent to ground. Staff assisted back to bed.

## 2018-04-28 NOTE — PROGRESS NOTE ADULT - SUBJECTIVE AND OBJECTIVE BOX
66 yo [place] speaking woman PMH of insulin dependent DM Type II, HTN, mild Dementia, Depression, p/w slurred speechm left sided weakness and numbness x 2 days.     MEDICATIONS  (STANDING):  aspirin enteric coated 81 milliGRAM(s) Oral daily  atorvastatin 80 milliGRAM(s) Oral at bedtime  clopidogrel Tablet 75 milliGRAM(s) Oral daily  dextrose 5%. 1000 milliLiter(s) (50 mL/Hr) IV Continuous <Continuous>  dextrose 50% Injectable 12.5 Gram(s) IV Push once  dextrose 50% Injectable 25 Gram(s) IV Push once  dextrose 50% Injectable 25 Gram(s) IV Push once  donepezil 10 milliGRAM(s) Oral at bedtime  insulin glargine Injectable (LANTUS) 40 Unit(s) SubCutaneous at bedtime  insulin lispro (HumaLOG) corrective regimen sliding scale   SubCutaneous three times a day before meals  insulin lispro (HumaLOG) corrective regimen sliding scale   SubCutaneous at bedtime  insulin lispro Injectable (HumaLOG) 18 Unit(s) SubCutaneous three times a day before meals  sertraline 100 milliGRAM(s) Oral daily    MEDICATIONS  (PRN):  acetaminophen   Tablet. 650 milliGRAM(s) Oral every 6 hours PRN mild, moderate pain  dextrose Gel 1 Dose(s) Oral once PRN Blood Glucose LESS THAN 70 milliGRAM(s)/deciliter  glucagon  Injectable 1 milliGRAM(s) IntraMuscular once PRN Glucose LESS THAN 70 milligrams/deciliter      Vital Signs Last 24 Hrs  T(C): 36.6 (28 Apr 2018 10:23), Max: 36.9 (28 Apr 2018 05:32)  T(F): 97.8 (28 Apr 2018 10:23), Max: 98.4 (28 Apr 2018 05:32)  HR: 84 (28 Apr 2018 10:23) (69 - 84)  BP: 157/85 (28 Apr 2018 10:23) (153/64 - 169/89)  BP(mean): --  RR: 18 (28 Apr 2018 10:23) (18 - 18)  SpO2: 96% (28 Apr 2018 10:23) (96% - 98%)      General : not in distress  HEENT : PERRLA  resp: b/l air entry present ++  card: s1 s2 ++  abd: soft , NT , BS++  ext : no edema   Neuro : AAOX 3 , right slight weak                          14.7   9.41  )-----------( 183      ( 28 Apr 2018 06:59 )             43.9       04-28    137  |  97<L>  |  15  ----------------------------<  264<H>  4.2   |  27  |  0.78    Ca    9.2      28 Apr 2018 08:11    TPro  6.9  /  Alb  3.4  /  TBili  0.4  /  DBili  x   /  AST  24  /  ALT  15  /  AlkPhos  164<H>  04-26      CARDIAC MARKERS ( 27 Apr 2018 08:45 )  x     / < 0.06 ng/mL / 38 u/L / 1.78 ng/mL / x      CARDIAC MARKERS ( 26 Apr 2018 23:16 )  x     / < 0.06 ng/mL / x     / x     / x        CT head:       IMPRESSION:  	  	No acute intracranial hemorrhage, large cortical infarct or mass effect.   	Additional findings as described. If there is continued clinical   	suspicion for acute infarct, MRI may be obtained for further evaluation.  	  	Nonspecific right mastoid opacification. Recommend clinical question to   	assess acute mastoiditis.

## 2018-04-28 NOTE — PROGRESS NOTE ADULT - ASSESSMENT
67 year old woman PMH of insulin dependent DM Type II, HTN, p/w slurred speech left sided weakness and numbness x 2 days. Pt admitted for evaluation of possible CVA    1. CVA (cerebral vascular accident  Serial EKGs,   TTE w/ bubble study pending   Continue aspirin and statin.   Permissive HTN  F/u MRI  neurology consult appreciated       2.DM (diabetes mellitus).    Hgba1c 9.1 uncontrolled. Pt has hx of diabetes complicated by retinopathy  Continue pre-meal  ISS for coverage  Diabetic diet.    3. HTN (hypertension).     Monitor blood pressure. Permissive HTN unless greater than or equal to 220/110  Plan to resume home meds and uptitrate as needed  Will resume ACEI last.     4. Mild dementia.     Continue donepezil.   5.Depression.     Continue sertraline.     Gi and DVT prophylaxis

## 2018-04-29 LAB
BUN SERPL-MCNC: 10 MG/DL — SIGNIFICANT CHANGE UP (ref 7–23)
CALCIUM SERPL-MCNC: 9.1 MG/DL — SIGNIFICANT CHANGE UP (ref 8.4–10.5)
CHLORIDE SERPL-SCNC: 98 MMOL/L — SIGNIFICANT CHANGE UP (ref 98–107)
CO2 SERPL-SCNC: 28 MMOL/L — SIGNIFICANT CHANGE UP (ref 22–31)
CREAT SERPL-MCNC: 0.75 MG/DL — SIGNIFICANT CHANGE UP (ref 0.5–1.3)
GLUCOSE SERPL-MCNC: 107 MG/DL — HIGH (ref 70–99)
HCT VFR BLD CALC: 46.7 % — HIGH (ref 34.5–45)
HGB BLD-MCNC: 15.5 G/DL — SIGNIFICANT CHANGE UP (ref 11.5–15.5)
MAGNESIUM SERPL-MCNC: 1.3 MG/DL — LOW (ref 1.6–2.6)
MCHC RBC-ENTMCNC: 28.8 PG — SIGNIFICANT CHANGE UP (ref 27–34)
MCHC RBC-ENTMCNC: 33.2 % — SIGNIFICANT CHANGE UP (ref 32–36)
MCV RBC AUTO: 86.6 FL — SIGNIFICANT CHANGE UP (ref 80–100)
NRBC # FLD: 0 — SIGNIFICANT CHANGE UP
PLATELET # BLD AUTO: 216 K/UL — SIGNIFICANT CHANGE UP (ref 150–400)
PMV BLD: 10.7 FL — SIGNIFICANT CHANGE UP (ref 7–13)
POTASSIUM SERPL-MCNC: 3.2 MMOL/L — LOW (ref 3.5–5.3)
POTASSIUM SERPL-SCNC: 3.2 MMOL/L — LOW (ref 3.5–5.3)
RBC # BLD: 5.39 M/UL — HIGH (ref 3.8–5.2)
RBC # FLD: 11.8 % — SIGNIFICANT CHANGE UP (ref 10.3–14.5)
SODIUM SERPL-SCNC: 139 MMOL/L — SIGNIFICANT CHANGE UP (ref 135–145)
WBC # BLD: 9.89 K/UL — SIGNIFICANT CHANGE UP (ref 3.8–10.5)
WBC # FLD AUTO: 9.89 K/UL — SIGNIFICANT CHANGE UP (ref 3.8–10.5)

## 2018-04-29 PROCEDURE — 99233 SBSQ HOSP IP/OBS HIGH 50: CPT

## 2018-04-29 RX ORDER — POTASSIUM CHLORIDE 20 MEQ
40 PACKET (EA) ORAL EVERY 4 HOURS
Qty: 0 | Refills: 0 | Status: COMPLETED | OUTPATIENT
Start: 2018-04-29 | End: 2018-04-29

## 2018-04-29 RX ORDER — MAGNESIUM SULFATE 500 MG/ML
4 VIAL (ML) INJECTION ONCE
Qty: 0 | Refills: 0 | Status: COMPLETED | OUTPATIENT
Start: 2018-04-29 | End: 2018-04-29

## 2018-04-29 RX ADMIN — Medication 18 UNIT(S): at 18:23

## 2018-04-29 RX ADMIN — INSULIN GLARGINE 40 UNIT(S): 100 INJECTION, SOLUTION SUBCUTANEOUS at 23:26

## 2018-04-29 RX ADMIN — SERTRALINE 100 MILLIGRAM(S): 25 TABLET, FILM COATED ORAL at 12:20

## 2018-04-29 RX ADMIN — DONEPEZIL HYDROCHLORIDE 10 MILLIGRAM(S): 10 TABLET, FILM COATED ORAL at 21:37

## 2018-04-29 RX ADMIN — Medication 650 MILLIGRAM(S): at 11:00

## 2018-04-29 RX ADMIN — CLOPIDOGREL BISULFATE 75 MILLIGRAM(S): 75 TABLET, FILM COATED ORAL at 12:20

## 2018-04-29 RX ADMIN — Medication 40 MILLIEQUIVALENT(S): at 14:46

## 2018-04-29 RX ADMIN — Medication 650 MILLIGRAM(S): at 12:00

## 2018-04-29 RX ADMIN — ATORVASTATIN CALCIUM 80 MILLIGRAM(S): 80 TABLET, FILM COATED ORAL at 21:37

## 2018-04-29 RX ADMIN — Medication 100 GRAM(S): at 12:20

## 2018-04-29 RX ADMIN — Medication 81 MILLIGRAM(S): at 12:20

## 2018-04-29 RX ADMIN — Medication 40 MILLIEQUIVALENT(S): at 11:00

## 2018-04-29 RX ADMIN — Medication 18 UNIT(S): at 09:12

## 2018-04-29 RX ADMIN — Medication 18 UNIT(S): at 13:28

## 2018-04-29 NOTE — PROGRESS NOTE ADULT - ASSESSMENT
67 year old woman PMH of insulin dependent DM Type II, HTN, p/w slurred speech left sided weakness and numbness x 2 days. Pt admitted for evaluation of possible CVA    1. CVA (cerebral vascular accident) MRI prelim show acute infarct    TTE w/ bubble study pending   Continue aspirin and statin.   Permissive HTN  neurology consult appreciated       2.DM (diabetes mellitus).    Hgba1c 9.1 uncontrolled. Pt has hx of diabetes complicated by retinopathy  Continue pre-meal  ISS for coverage  Diabetic diet.    3. HTN (hypertension).     Monitor blood pressure. Permissive HTN unless greater than or equal to 220/110  Plan to resume home meds and uptitrate as needed  Will resume ACEI last.     4. Mild dementia.     Continue donepezil.   5.Depression.     Continue sertraline.     Gi and DVT prophylaxis     PT : rehab 67 year old woman PMH of insulin dependent DM Type II, HTN, p/w slurred speech left sided weakness and numbness x 2 days. Pt admitted for evaluation of possible CVA    1. CVA (cerebral vascular accident) MRI prelim show acute infarct    TTE w/ bubble study pending   Continue aspirin and statin.   Permissive HTN  neurology consult appreciated       2.DM (diabetes mellitus).    Hgba1c 9.1 uncontrolled. Pt has hx of diabetes complicated by retinopathy  Continue pre-meal  ISS for coverage  Diabetic diet.    3. HTN (hypertension).     Monitor blood pressure. Permissive HTN unless greater than or equal to 220/110  Plan to resume home meds and uptitrate as needed  Will resume ACEI last.     4. Mild dementia.     Continue donepezil.   5.Depression.     Continue sertraline.     6. hypomagnesia and hypokalemia   replaced   repeat labs       Gi and DVT prophylaxis     PT : rehab

## 2018-04-29 NOTE — PROGRESS NOTE ADULT - SUBJECTIVE AND OBJECTIVE BOX
68 yo Nepalese speaking woman PMH of insulin dependent DM Type II, HTN, mild Dementia, Depression, p/w slurred speechm left sided weakness and numbness x 2 days.     patient seen and examine at bed side   she  fell yesterday as she was week and hypotensive   she state she feel her hand are week   repeat ct is negative   pre magana mri show acute infarct       MEDICATIONS  (STANDING):  aspirin enteric coated 81 milliGRAM(s) Oral daily  atorvastatin 80 milliGRAM(s) Oral at bedtime  clopidogrel Tablet 75 milliGRAM(s) Oral daily  dextrose 5%. 1000 milliLiter(s) (50 mL/Hr) IV Continuous <Continuous>  dextrose 50% Injectable 12.5 Gram(s) IV Push once  dextrose 50% Injectable 25 Gram(s) IV Push once  dextrose 50% Injectable 25 Gram(s) IV Push once  donepezil 10 milliGRAM(s) Oral at bedtime  insulin glargine Injectable (LANTUS) 40 Unit(s) SubCutaneous at bedtime  insulin lispro (HumaLOG) corrective regimen sliding scale   SubCutaneous three times a day before meals  insulin lispro (HumaLOG) corrective regimen sliding scale   SubCutaneous at bedtime  insulin lispro Injectable (HumaLOG) 18 Unit(s) SubCutaneous three times a day before meals  potassium chloride    Tablet ER 40 milliEquivalent(s) Oral every 4 hours  sertraline 100 milliGRAM(s) Oral daily    MEDICATIONS  (PRN):  acetaminophen   Tablet. 650 milliGRAM(s) Oral every 6 hours PRN mild, moderate pain  dextrose Gel 1 Dose(s) Oral once PRN Blood Glucose LESS THAN 70 milliGRAM(s)/deciliter  glucagon  Injectable 1 milliGRAM(s) IntraMuscular once PRN Glucose LESS THAN 70 milligrams/deciliter      Vital Signs Last 24 Hrs  T(C): 36.8 (29 Apr 2018 05:00), Max: 36.8 (29 Apr 2018 05:00)  T(F): 98.3 (29 Apr 2018 05:00), Max: 98.3 (29 Apr 2018 05:00)  HR: 78 (29 Apr 2018 05:00) (60 - 78)  BP: 135/78 (29 Apr 2018 05:00) (135/78 - 176/79)  BP(mean): --  RR: 18 (29 Apr 2018 05:00) (18 - 18)  SpO2: 97% (29 Apr 2018 05:00) (97% - 100%)    General : not in distress  HEENT : PERRLA  resp: b/l air entry present ++  card: s1 s2 ++  abd: soft , NT , BS++  ext : no edema   Neuro : AAOX 3 , right slight weak                                       15.5   9.89  )-----------( 216      ( 29 Apr 2018 07:15 )             46.7         04-29    139  |  98  |  10  ----------------------------<  107<H>  3.2<L>   |  28  |  0.75    Ca    9.1      29 Apr 2018 07:15  Mg     1.3     04-29          CARDIAC MARKERS ( 27 Apr 2018 08:45 )  x     / < 0.06 ng/mL / 38 u/L / 1.78 ng/mL / x      CARDIAC MARKERS ( 26 Apr 2018 23:16 )  x     / < 0.06 ng/mL / x     / x     / x        CT head:       IMPRESSION:  	  	No acute intracranial hemorrhage, large cortical infarct or mass effect.   	Additional findings as described. If there is continued clinical   	suspicion for acute infarct, MRI may be obtained for further evaluation.  	  	Nonspecific right mastoid opacification. Recommend clinical question to   	assess acute mastoiditis.

## 2018-04-30 DIAGNOSIS — E11.69 TYPE 2 DIABETES MELLITUS WITH OTHER SPECIFIED COMPLICATION: ICD-10-CM

## 2018-04-30 DIAGNOSIS — Z29.9 ENCOUNTER FOR PROPHYLACTIC MEASURES, UNSPECIFIED: ICD-10-CM

## 2018-04-30 DIAGNOSIS — I63.9 CEREBRAL INFARCTION, UNSPECIFIED: ICD-10-CM

## 2018-04-30 DIAGNOSIS — I10 ESSENTIAL (PRIMARY) HYPERTENSION: ICD-10-CM

## 2018-04-30 DIAGNOSIS — F32.9 MAJOR DEPRESSIVE DISORDER, SINGLE EPISODE, UNSPECIFIED: ICD-10-CM

## 2018-04-30 LAB
BUN SERPL-MCNC: 9 MG/DL — SIGNIFICANT CHANGE UP (ref 7–23)
BUN SERPL-MCNC: 9 MG/DL — SIGNIFICANT CHANGE UP (ref 7–23)
CALCIUM SERPL-MCNC: 9.1 MG/DL — SIGNIFICANT CHANGE UP (ref 8.4–10.5)
CALCIUM SERPL-MCNC: 9.1 MG/DL — SIGNIFICANT CHANGE UP (ref 8.4–10.5)
CHLORIDE SERPL-SCNC: 99 MMOL/L — SIGNIFICANT CHANGE UP (ref 98–107)
CHLORIDE SERPL-SCNC: 99 MMOL/L — SIGNIFICANT CHANGE UP (ref 98–107)
CO2 SERPL-SCNC: 28 MMOL/L — SIGNIFICANT CHANGE UP (ref 22–31)
CO2 SERPL-SCNC: 28 MMOL/L — SIGNIFICANT CHANGE UP (ref 22–31)
CREAT SERPL-MCNC: 0.74 MG/DL — SIGNIFICANT CHANGE UP (ref 0.5–1.3)
CREAT SERPL-MCNC: 0.74 MG/DL — SIGNIFICANT CHANGE UP (ref 0.5–1.3)
GLUCOSE SERPL-MCNC: 149 MG/DL — HIGH (ref 70–99)
GLUCOSE SERPL-MCNC: 149 MG/DL — HIGH (ref 70–99)
HCT VFR BLD CALC: 47.4 % — HIGH (ref 34.5–45)
HGB BLD-MCNC: 15.5 G/DL — SIGNIFICANT CHANGE UP (ref 11.5–15.5)
MAGNESIUM SERPL-MCNC: 2 MG/DL — SIGNIFICANT CHANGE UP (ref 1.6–2.6)
MCHC RBC-ENTMCNC: 28.7 PG — SIGNIFICANT CHANGE UP (ref 27–34)
MCHC RBC-ENTMCNC: 32.7 % — SIGNIFICANT CHANGE UP (ref 32–36)
MCV RBC AUTO: 87.8 FL — SIGNIFICANT CHANGE UP (ref 80–100)
NRBC # FLD: 0 — SIGNIFICANT CHANGE UP
PLATELET # BLD AUTO: 218 K/UL — SIGNIFICANT CHANGE UP (ref 150–400)
PMV BLD: 10.1 FL — SIGNIFICANT CHANGE UP (ref 7–13)
POTASSIUM SERPL-MCNC: 4.3 MMOL/L — SIGNIFICANT CHANGE UP (ref 3.5–5.3)
POTASSIUM SERPL-MCNC: 4.3 MMOL/L — SIGNIFICANT CHANGE UP (ref 3.5–5.3)
POTASSIUM SERPL-SCNC: 4.3 MMOL/L — SIGNIFICANT CHANGE UP (ref 3.5–5.3)
POTASSIUM SERPL-SCNC: 4.3 MMOL/L — SIGNIFICANT CHANGE UP (ref 3.5–5.3)
RBC # BLD: 5.4 M/UL — HIGH (ref 3.8–5.2)
RBC # FLD: 11.9 % — SIGNIFICANT CHANGE UP (ref 10.3–14.5)
SODIUM SERPL-SCNC: 138 MMOL/L — SIGNIFICANT CHANGE UP (ref 135–145)
SODIUM SERPL-SCNC: 138 MMOL/L — SIGNIFICANT CHANGE UP (ref 135–145)
WBC # BLD: 11.28 K/UL — HIGH (ref 3.8–10.5)
WBC # FLD AUTO: 11.28 K/UL — HIGH (ref 3.8–10.5)

## 2018-04-30 PROCEDURE — 99233 SBSQ HOSP IP/OBS HIGH 50: CPT

## 2018-04-30 PROCEDURE — 99222 1ST HOSP IP/OBS MODERATE 55: CPT | Mod: GC

## 2018-04-30 RX ORDER — ENOXAPARIN SODIUM 100 MG/ML
30 INJECTION SUBCUTANEOUS EVERY 24 HOURS
Qty: 0 | Refills: 0 | Status: DISCONTINUED | OUTPATIENT
Start: 2018-04-30 | End: 2018-05-08

## 2018-04-30 RX ORDER — INSULIN LISPRO 100/ML
17 VIAL (ML) SUBCUTANEOUS
Qty: 0 | Refills: 0 | Status: DISCONTINUED | OUTPATIENT
Start: 2018-04-30 | End: 2018-05-01

## 2018-04-30 RX ORDER — LISINOPRIL 2.5 MG/1
40 TABLET ORAL DAILY
Qty: 0 | Refills: 0 | Status: DISCONTINUED | OUTPATIENT
Start: 2018-04-30 | End: 2018-05-08

## 2018-04-30 RX ADMIN — Medication 17 UNIT(S): at 18:23

## 2018-04-30 RX ADMIN — LISINOPRIL 40 MILLIGRAM(S): 2.5 TABLET ORAL at 11:58

## 2018-04-30 RX ADMIN — Medication 81 MILLIGRAM(S): at 11:58

## 2018-04-30 RX ADMIN — INSULIN GLARGINE 40 UNIT(S): 100 INJECTION, SOLUTION SUBCUTANEOUS at 22:27

## 2018-04-30 RX ADMIN — Medication 18 UNIT(S): at 09:18

## 2018-04-30 RX ADMIN — Medication 4: at 18:23

## 2018-04-30 RX ADMIN — ATORVASTATIN CALCIUM 80 MILLIGRAM(S): 80 TABLET, FILM COATED ORAL at 21:02

## 2018-04-30 RX ADMIN — SERTRALINE 100 MILLIGRAM(S): 25 TABLET, FILM COATED ORAL at 11:58

## 2018-04-30 RX ADMIN — CLOPIDOGREL BISULFATE 75 MILLIGRAM(S): 75 TABLET, FILM COATED ORAL at 11:58

## 2018-04-30 RX ADMIN — ENOXAPARIN SODIUM 30 MILLIGRAM(S): 100 INJECTION SUBCUTANEOUS at 11:58

## 2018-04-30 RX ADMIN — DONEPEZIL HYDROCHLORIDE 10 MILLIGRAM(S): 10 TABLET, FILM COATED ORAL at 21:02

## 2018-04-30 NOTE — DISCHARGE NOTE ADULT - MEDICATION SUMMARY - MEDICATIONS TO TAKE
I will START or STAY ON the medications listed below when I get home from the hospital:    acetaminophen 325 mg oral tablet  -- 2 tab(s) by mouth every 6 hours, As needed, mild, moderate pain  -- Indication: For Pain    aspirin 81 mg oral delayed release tablet  -- 1 tab(s) by mouth once a day  -- Indication: For CVA (cerebral vascular accident)    lisinopril 40 mg oral tablet  -- 1 tab(s) by mouth once a day  -- Indication: For HTN (hypertension)    gabapentin 100 mg oral capsule  -- 1 cap(s) by mouth once a day  -- Indication: For Neuropathy    sertraline 100 mg oral tablet  -- 1 tab(s) by mouth once a day  -- Indication: For Depression    HumaLOG KwikPen 100 units/mL injectable solution  -- 6 unit(s) subcutaneous 3 times a day (before meals)  -- Indication: For DM (diabetes mellitus)    Lantus Solostar Pen 100 units/mL subcutaneous solution  -- 32 unit(s) subcutaneous once a day (at bedtime)  -- Indication: For DM (diabetes mellitus)    meclizine 25 mg oral tablet  -- 1 tab(s) by mouth every 6 hours  -- Indication: For Dizziness    atorvastatin 80 mg oral tablet  -- 1 tab(s) by mouth once a day (at bedtime)  -- Indication: For Hyperlipidemia     clopidogrel 75 mg oral tablet  -- 1 tab(s) by mouth once a day  -- Indication: For CVA (cerebral vascular accident)    amLODIPine 5 mg oral tablet  -- 1 tab(s) by mouth once a day  -- Indication: For HTN (hypertension)    donepezil 10 mg oral tablet  -- 1 tab(s) by mouth once a day (at bedtime)  -- Indication: For Depression

## 2018-04-30 NOTE — DISCHARGE NOTE ADULT - ADDITIONAL INSTRUCTIONS
follow up with your PMD in one week - call for appointment  follow up with your Endocrinologist in one week - call for appointment

## 2018-04-30 NOTE — DISCHARGE NOTE ADULT - MEDICATION SUMMARY - MEDICATIONS TO CHANGE
I will SWITCH the dose or number of times a day I take the medications listed below when I get home from the hospital:    atorvastatin 40 mg oral tablet  -- 1 tab(s) by mouth once a day    amLODIPine 2.5 mg oral tablet  -- 1 tab(s) by mouth once a day    NovoLOG 100 units/mL subcutaneous solution  -- 18 unit(s) subcutaneous 3 times a day (before meals)    Basaglar KwikPen 100 units/mL subcutaneous solution  -- 50 unit(s) subcutaneous once a day (at bedtime)

## 2018-04-30 NOTE — DISCHARGE NOTE ADULT - CARE PLAN
Principal Discharge DX:	CVA (cerebral vascular accident)  Goal:	To help recover or improve as much as possible your sensory and motor abilities, to become more independent, and prevent future strokes.  Assessment and plan of treatment:	continue aspirin and palvix for 3 months follow by aspirin alone. follow up with Neurology in 1 months. continue PT/OT/speech therapy at rehab  Secondary Diagnosis:	DM (diabetes mellitus)  Goal:	To maintain a normal blood glucose level and HgA1C level < 5.7 and to prevent diabetic complications such as uncontrolled diabetes, diabetic coma, blindness, renal failure, and amputations.  Assessment and plan of treatment:	Monitor finger sticks pre-meal and bedtime, low salt, fat and carbohydrate diet, minimize glucose intake.  Exercise daily for at least 30 minutes and weight loss.  Follow up with primary care physician and endocrinologist for routine Hemoglobin A1C checks and management.  Follow up with your ophthalmologist for routine yearly vision exams.  Secondary Diagnosis:	Hyperlipidemia LDL goal <70  Goal:	To maintain normal cholesterol levels to prevent stroke, coronary artery disease, peripheral vascular disease and heart attacks.  Assessment and plan of treatment:	Low fat diet, exercise daily and continue current medications. Follow up with primary care physician and cardiologist for management.  Secondary Diagnosis:	Hypertension, unspecified type  Goal:	To maintain a normal blood pressure to prevent heart attack, stroke and renal failure.  Assessment and plan of treatment:	Low sodium and fat diet, continue anti-hypertensive medications, and follow up with primary care physician.  Secondary Diagnosis:	Orthostasis  Goal:	prevent dizziness  Assessment and plan of treatment:	maintain properly hydrated. fall precaution.

## 2018-04-30 NOTE — PROGRESS NOTE ADULT - PROBLEM SELECTOR PLAN 1
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  -ASA/plavix x 3 mo f/b ASA   -LDL-87 statin  -PT-rehab  -TTE w/ bubble pending

## 2018-04-30 NOTE — DISCHARGE NOTE ADULT - PLAN OF CARE
To help recover or improve as much as possible your sensory and motor abilities, to become more independent, and prevent future strokes. continue aspirin and palvix for 3 months follow by aspirin alone. follow up with Neurology in 1 months. continue PT/OT/speech therapy at rehab To maintain a normal blood glucose level and HgA1C level < 5.7 and to prevent diabetic complications such as uncontrolled diabetes, diabetic coma, blindness, renal failure, and amputations. Monitor finger sticks pre-meal and bedtime, low salt, fat and carbohydrate diet, minimize glucose intake.  Exercise daily for at least 30 minutes and weight loss.  Follow up with primary care physician and endocrinologist for routine Hemoglobin A1C checks and management.  Follow up with your ophthalmologist for routine yearly vision exams. To maintain normal cholesterol levels to prevent stroke, coronary artery disease, peripheral vascular disease and heart attacks. Low fat diet, exercise daily and continue current medications. Follow up with primary care physician and cardiologist for management. To maintain a normal blood pressure to prevent heart attack, stroke and renal failure. Low sodium and fat diet, continue anti-hypertensive medications, and follow up with primary care physician. prevent dizziness maintain properly hydrated. fall precaution.

## 2018-04-30 NOTE — DISCHARGE NOTE ADULT - HOSPITAL COURSE
68 yo [place]ian speaking woman PMH of insulin dependent DM Type II, HTN, mild Dementia, Depression, p/w slurred speechm left sided weakness and numbness x 2 days.  HPI was obtained through interpretation by daughter.  Pt states that left sided weakness and numbness started 2 days ago (Wednesday night). Pt states that she had associated drooling from the left side of her mouth and her tongue felt heavy.  Pt states that her "left leg wouldn't respond" and her left arm felt weak, so she was not able to walk.  She states that she believed it was due to her diabetes and went to sleep. The next morning her daughter called her and she states that the pt's speech was "incomprehensible," so the daughter called an ambulance.  Pt states that the last time she felt normal was Wednesday morning and that she has never had a stroke or these symptoms before. Pt states that left sided weakness has improved and was better yesterday than initial day.  Pt endorses intermittent lightheadedness when ambulating and blurry vision since Wednesday.  Pt denies CP, SOB, N/V/D, fever, chills, diaphoresis, tinnitus, edema, vertigo. She appears comfortable at this time. She is being admitted to telemetry to r/o cva.    Hospital course:  EKG: NSR @ 63  CE x 2 neg           Glucose: 186             WBC: 10.89                Na: 134  Alk phos: 164  4/27 CT Head - no acute intracranial hemorrhage, large cortical infarct or mass effect. Additional findings as described. If there is continued clinical suspicion for acute infarct, MRI may be obtained for further evaluation. Nonspecific right mastoid opacification. Recommend clinical questio to access acute mastoiditis.  4/27 MRI/MRA H&N: Acute versus acute/subacute right paramedian pontine infarction barely extending to the surface of the louis. Intracranial MR angiography demonstrates no antegrade flow seen within the right V4 segment. Intracranial MR angiography is otherwise within normal limits. 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  4/28 CT head: Microvascular disease. No interval acute intracranial hemorrhage.  5/2 TTE with bubble: CONCLUSIONS: Mitral annular calcification, otherwise normal mitral valve. Minimal mitral regurgitation. Endocardium not well visualized; grossly normal left ventricular systolic function. The right ventricle is not well visualized; grossly normal right ventricular systolic function. A bubble study was performed with the intravenous injection of agitated saline contrast and was inconclusive regarding the presence of an interatrial shunt. No obvious cardiac source of embolus was identified on this transthoracic study.  If clinical suspicion is high, consider AVE for further evaluation.  5/3- CXR-  Clear lungs.    Pt noted with slurred speech and left sided weakness, radiological image noted acute versus acute/subacute right paramedian pontine, intracranial MRI noted stenosis. TTE with inconclusive for PFO. CVA likely from large artery atherosclerosis. Pt started on statin, asa/plavix for 3 months follow by aspirin alone. Pt also noted with + orthostatic given fluid with mild improvement. + orthostatic likely partially contributed by autonomic insufficiency. BP medical tailored. Pt seen by PT and speech/swallow recommended rehab with speech therapy. 68 yo [place]ian speaking woman PMH of insulin dependent DM Type II, HTN, mild Dementia, Depression, p/w slurred speechm left sided weakness and numbness x 2 days.  HPI was obtained through interpretation by daughter.  Pt states that left sided weakness and numbness started 2 days ago (Wednesday night). Pt states that she had associated drooling from the left side of her mouth and her tongue felt heavy.  Pt states that her "left leg wouldn't respond" and her left arm felt weak, so she was not able to walk.  She states that she believed it was due to her diabetes and went to sleep. The next morning her daughter called her and she states that the pt's speech was "incomprehensible," so the daughter called an ambulance.  Pt states that the last time she felt normal was Wednesday morning and that she has never had a stroke or these symptoms before. Pt states that left sided weakness has improved and was better yesterday than initial day.  Pt endorses intermittent lightheadedness when ambulating and blurry vision since Wednesday.  Pt denies CP, SOB, N/V/D, fever, chills, diaphoresis, tinnitus, edema, vertigo. She appears comfortable at this time. She is being admitted to telemetry to r/o cva.    Hospital course:  EKG: NSR @ 63  CE x 2 neg           Glucose: 186             WBC: 10.89                Na: 134  Alk phos: 164  4/27 CT Head - no acute intracranial hemorrhage, large cortical infarct or mass effect. Additional findings as described. If there is continued clinical suspicion for acute infarct, MRI may be obtained for further evaluation. Nonspecific right mastoid opacification. Recommend clinical questio to access acute mastoiditis.  4/27 MRI/MRA H&N: Acute versus acute/subacute right paramedian pontine infarction barely extending to the surface of the louis. Intracranial MR angiography demonstrates no antegrade flow seen within the right V4 segment. Intracranial MR angiography is otherwise within normal limits. 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  4/28 CT head: Microvascular disease. No interval acute intracranial hemorrhage.  5/2 TTE with bubble: CONCLUSIONS: Mitral annular calcification, otherwise normal mitral valve. Minimal mitral regurgitation. Endocardium not well visualized; grossly normal left ventricular systolic function. The right ventricle is not well visualized; grossly normal right ventricular systolic function. A bubble study was performed with the intravenous injection of agitated saline contrast and was inconclusive regarding the presence of an interatrial shunt. No obvious cardiac source of embolus was identified on this transthoracic study.  If clinical suspicion is high, consider AVE for further evaluation.  5/3- CXR-  Clear lungs.    Pt noted with slurred speech and left sided weakness, radiological image noted acute versus acute/subacute right paramedian pontine, intracranial MRI noted stenosis. TTE with inconclusive for PFO. CVA likely from large artery atherosclerosis. Pt started on statin, asa/plavix for 3 months follow by aspirin alone. Pt also noted with + orthostatic given fluid with mild improvement. + orthostatic likely partially contributed by autonomic insufficiency. BP medical tailored. Pt seen by PT and speech/swallow recommended rehab with speech therapy.     Discussed with MD - pt stable for discharge to rehab, pt with no complaints, discharge medications reviewed with MD.

## 2018-04-30 NOTE — PROGRESS NOTE ADULT - PROBLEM SELECTOR PLAN 2
+orthostatic does not appear dehydrated on exam likely related to autonomic dsyfunction from age and DM would keep BP approx 150 will restart lisinopril hold norvasc and lasix

## 2018-04-30 NOTE — DISCHARGE NOTE ADULT - PROVIDER TOKENS
TOKEN:'[id number]',FREE:[LAST:[Dr Daphne Judge],PHONE:[(   )    -],FAX:[(   )    -],ADDRESS:[PCP]]

## 2018-04-30 NOTE — PROGRESS NOTE ADULT - ASSESSMENT
67 year old woman PMH of insulin dependent DM Type II, HTN, p/w slurred speech left sided weakness and numbness x 2 days. MRI +RT paramedian louis infarct  +V4-V3 stenosis

## 2018-04-30 NOTE — DISCHARGE NOTE ADULT - CARE PROVIDER_API CALL
Libman, Richard B (MD), Neurology; Vascular Neurology  [address] [address]  Phone: [phone]  Fax: [phone]    Dr Daphne Judge,   PCP  Phone: (   )    -  Fax: (   )    -

## 2018-04-30 NOTE — PROGRESS NOTE ADULT - SUBJECTIVE AND OBJECTIVE BOX
Patient is a 67y old  Female who presents with a chief complaint of Left sided weakness/numbness (30 Apr 2018 08:25)      SUBJECTIVE / OVERNIGHT EVENTS: Pt in NAD no fever, SOB FS 72 with lunch yesterday. +orthostatics     MEDICATIONS  (STANDING):  aspirin enteric coated 81 milliGRAM(s) Oral daily  atorvastatin 80 milliGRAM(s) Oral at bedtime  clopidogrel Tablet 75 milliGRAM(s) Oral daily  dextrose 5%. 1000 milliLiter(s) (50 mL/Hr) IV Continuous <Continuous>  dextrose 50% Injectable 12.5 Gram(s) IV Push once  dextrose 50% Injectable 25 Gram(s) IV Push once  dextrose 50% Injectable 25 Gram(s) IV Push once  donepezil 10 milliGRAM(s) Oral at bedtime  insulin glargine Injectable (LANTUS) 40 Unit(s) SubCutaneous at bedtime  insulin lispro (HumaLOG) corrective regimen sliding scale   SubCutaneous three times a day before meals  insulin lispro (HumaLOG) corrective regimen sliding scale   SubCutaneous at bedtime  insulin lispro Injectable (HumaLOG) 18 Unit(s) SubCutaneous three times a day before meals  sertraline 100 milliGRAM(s) Oral daily    MEDICATIONS  (PRN):  acetaminophen   Tablet. 650 milliGRAM(s) Oral every 6 hours PRN mild, moderate pain  dextrose Gel 1 Dose(s) Oral once PRN Blood Glucose LESS THAN 70 milliGRAM(s)/deciliter  glucagon  Injectable 1 milliGRAM(s) IntraMuscular once PRN Glucose LESS THAN 70 milligrams/deciliter        CAPILLARY BLOOD GLUCOSE      POCT Blood Glucose.: 135 mg/dL (30 Apr 2018 08:44)  POCT Blood Glucose.: 98 mg/dL (29 Apr 2018 22:15)  POCT Blood Glucose.: 130 mg/dL (29 Apr 2018 18:06)  POCT Blood Glucose.: 72 mg/dL (29 Apr 2018 13:18)    I&O's Summary    29 Apr 2018 07:01  -  30 Apr 2018 07:00  --------------------------------------------------------  IN: 0 mL / OUT: 700 mL / NET: -700 mL        T(C): 36.7 (04-30-18 @ 05:44), Max: 36.9 (04-29-18 @ 21:34)  HR: 63 (04-30-18 @ 05:44) (63 - 80)  BP: 163/72 (04-30-18 @ 05:44) (163/72 - 183/88)  RR: 18 (04-30-18 @ 05:44) (18 - 18)  SpO2: 99% (04-30-18 @ 05:44) (95% - 99%)    PHYSICAL EXAM:  GENERAL: NAD, well-developed  HEAD:  Atraumatic, Normocephalic  EYES: EOMI, PERRLA, conjunctiva and sclera clear  NECK: Supple, No JVD  CHEST/LUNG: Clear to auscultation bilaterally; No wheeze  HEART: Regular rate and rhythm; No murmurs, rubs, or gallops  ABDOMEN: Soft, Nontender, Nondistended; Bowel sounds present  EXTREMITIES:  2+ Peripheral Pulses, No clubbing, cyanosis, or edema  PSYCH: AAOx3  NEUROLOGY: non-focal  SKIN: No rashes or lesions    LABS:                        15.5   11.28 )-----------( 218      ( 30 Apr 2018 07:06 )             47.4     04-30    138  |  99  |  9   ----------------------------<  149<H>  4.3   |  28  |  0.74    Ca    9.1      30 Apr 2018 07:06  Mg     2.0     04-30                  RADIOLOGY & ADDITIONAL TESTS:    Imaging Personally Reviewed:< from: MR Head No Cont (04.27.18 @ 13:59) >    PROCEDURE DATE:  Apr 27 2018         INTERPRETATION:  HISTORY: Stroke. History of diabetes, hypertension.   Left-sided weakness.    Technique: Noncontrast brain MRI, MRA and neck MRA was performed.     Through the brain, sagittal and axial T1, axial T2, FLAIR, diffusion   weighted images and an ADC map were obtained.    MR angiography of intracranial and extracranial circulation was performed   with time of flight imaging technique. Maximal intensity projection   images were reviewed in multiple planes.    3-D reformations were made of vasculature. 3-D reformations were reviewed   and included in interpretation of the official report.    COMPARISON: CT head dated 4/27/2018.    FINDINGS:  Brain MRI:  Right paramedian pontine acute versus acute/subacute infarction is seen   as diffusion restriction associated with ADC map signal dropout.   Infarction barely extends to the surface of the [place]. There is   corresponding parenchymal hyperintense T2/FLAIR signal without evidence   for hemorrhagic transformation.    Mild prominence of the ventricles and sulci is consistent with   age-appropriate line loss. Periventricular and hemispheric white matter   hyperintense T2/FLAIR signal abnormalities are consistent with mild   microvascular ischemic change. There is no intraparenchymal hematoma,   mass effect or midline shift. No areas of abnormal restrictive diffusion   are present to suggest acute or subacute infarction. No abnormal   extra-axial fluid collections are present.     The calvarium is intact. Visualized intraorbital compartments, and   tympanomastoid cavities appear free of acute disease. There is mild left   maxillary and bilateral ethmoid mucosal thickening.      Brain MRA:  Internal carotid arteries demonstrate normal antegrade flow related   enhancement to the [place] bilaterally. Visualized portions of   the anterior and middle cerebral arteries are within normal limits. The   anterior communicating artery is normal. There is no evidence of   aneurysm, vascular malformation or occlusion.    There is minimal flow related enhancement seen within the right V3   segment. No flow within the right V4 segment is appreciated. 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 The left V4 segment is normal to   the vertebrobasilar confluence. Branch vasculature of the posterior   circulation is within normal limits.      Neck MRA:  Right:  The visualized right common carotid artery isnormal in course   and caliber to the carotid bifurcation. There is no significant stenosis   by NASCET criteria along origin of either internal or external carotid   artery. The right internal carotid artery has normal course and caliber   to the intracranial circulation.    The visualized right vertebral artery is normal in course and caliber to   the intracranial circulation and vertebrobasilar confluence.    Left: The visualized left common carotid artery is normal in course and   caliber to the carotid bifurcation. There is no significant stenosis by   NASCET criteria along origin of either internal or external carotid   artery. The left internal carotid artery has normal course and caliber to   the intracranial circulation.    The visualized left vertebral artery appears small. There is attenuation   of the left vertebral artery lumen which may be congenital or related to   proximal high-grade stenosis to the distal V3 segment. The V4 segment is   not seen which may be due to high-grade stenosis.         IMPRESSION:  Acute versus acute/subacute right paramedian pontine infarction barely   extending to the surface of the [place].  Intracranial MR angiography demonstrates no antegrade flow seen within   the right V4 segment.  Intracranial MR angiography is otherwise within normal limits.  Extracranial MR angiography demonstrates small right vertebral artery   which may be due to congenital hypoplasia versus proximal high-grade   stenosis, due to diminution of flow seen within theV1 through V3 with   loss of flow seen in the V4 segment.      < end of copied text >      Consultant(s) Notes Reviewed:      Care Discussed with Consultants/Other Providers: Patient is a 67y old  Female who presents with a chief complaint of Left sided weakness/numbness (30 Apr 2018 08:25)      SUBJECTIVE / OVERNIGHT EVENTS: Pt in NAD no fever, SOB FS 72 with lunch yesterday. +orthostatics     MEDICATIONS  (STANDING):  aspirin enteric coated 81 milliGRAM(s) Oral daily  atorvastatin 80 milliGRAM(s) Oral at bedtime  clopidogrel Tablet 75 milliGRAM(s) Oral daily  dextrose 5%. 1000 milliLiter(s) (50 mL/Hr) IV Continuous <Continuous>  dextrose 50% Injectable 12.5 Gram(s) IV Push once  dextrose 50% Injectable 25 Gram(s) IV Push once  dextrose 50% Injectable 25 Gram(s) IV Push once  donepezil 10 milliGRAM(s) Oral at bedtime  insulin glargine Injectable (LANTUS) 40 Unit(s) SubCutaneous at bedtime  insulin lispro (HumaLOG) corrective regimen sliding scale   SubCutaneous three times a day before meals  insulin lispro (HumaLOG) corrective regimen sliding scale   SubCutaneous at bedtime  insulin lispro Injectable (HumaLOG) 18 Unit(s) SubCutaneous three times a day before meals  sertraline 100 milliGRAM(s) Oral daily    MEDICATIONS  (PRN):  acetaminophen   Tablet. 650 milliGRAM(s) Oral every 6 hours PRN mild, moderate pain  dextrose Gel 1 Dose(s) Oral once PRN Blood Glucose LESS THAN 70 milliGRAM(s)/deciliter  glucagon  Injectable 1 milliGRAM(s) IntraMuscular once PRN Glucose LESS THAN 70 milligrams/deciliter        CAPILLARY BLOOD GLUCOSE      POCT Blood Glucose.: 135 mg/dL (30 Apr 2018 08:44)  POCT Blood Glucose.: 98 mg/dL (29 Apr 2018 22:15)  POCT Blood Glucose.: 130 mg/dL (29 Apr 2018 18:06)  POCT Blood Glucose.: 72 mg/dL (29 Apr 2018 13:18)    I&O's Summary    29 Apr 2018 07:01  -  30 Apr 2018 07:00  --------------------------------------------------------  IN: 0 mL / OUT: 700 mL / NET: -700 mL        T(C): 36.7 (04-30-18 @ 05:44), Max: 36.9 (04-29-18 @ 21:34)  HR: 63 (04-30-18 @ 05:44) (63 - 80)  BP: 163/72 (04-30-18 @ 05:44) (163/72 - 183/88)  RR: 18 (04-30-18 @ 05:44) (18 - 18)  SpO2: 99% (04-30-18 @ 05:44) (95% - 99%)    PHYSICAL EXAM:  GENERAL: NAD, well-developed  HEAD:  Atraumatic, Normocephalic  EYES: EOMI, PERRLA, conjunctiva and sclera clear  NECK: Supple, No JVD  CHEST/LUNG: Clear to auscultation bilaterally; No wheeze  HEART: Regular rate and rhythm; No murmurs, rubs, or gallops  ABDOMEN: Soft, Nontender, Nondistended; Bowel sounds present  EXTREMITIES:  2+ Peripheral Pulses, No clubbing, cyanosis, or edema  Neuro: mild LT hemiparesis      LABS:                        15.5   11.28 )-----------( 218      ( 30 Apr 2018 07:06 )             47.4     04-30    138  |  99  |  9   ----------------------------<  149<H>  4.3   |  28  |  0.74    Ca    9.1      30 Apr 2018 07:06  Mg     2.0     04-30                  RADIOLOGY & ADDITIONAL TESTS:    Imaging Personally Reviewed:< from: MR Head No Cont (04.27.18 @ 13:59) >    PROCEDURE DATE:  Apr 27 2018         INTERPRETATION:  HISTORY: Stroke. History of diabetes, hypertension.   Left-sided weakness.    Technique: Noncontrast brain MRI, MRA and neck MRA was performed.     Through the brain, sagittal and axial T1, axial T2, FLAIR, diffusion   weighted images and an ADC map were obtained.    MR angiography of intracranial and extracranial circulation was performed   with time of flight imaging technique. Maximal intensity projection   images were reviewed in multiple planes.    3-D reformations were made of vasculature. 3-D reformations were reviewed   and included in interpretation of the official report.    COMPARISON: CT head dated 4/27/2018.    FINDINGS:  Brain MRI:  Right paramedian pontine acute versus acute/subacute infarction is seen   as diffusion restriction associated with ADC map signal dropout.   Infarction barely extends to the surface of the [place]. There is   corresponding parenchymal hyperintense T2/FLAIR signal without evidence   for hemorrhagic transformation.    Mild prominence of the ventricles and sulci is consistent with   age-appropriate line loss. Periventricular and hemispheric white matter   hyperintense T2/FLAIR signal abnormalities are consistent with mild   microvascular ischemic change. There is no intraparenchymal hematoma,   mass effect or midline shift. No areas of abnormal restrictive diffusion   are present to suggest acute or subacute infarction. No abnormal   extra-axial fluid collections are present.     The calvarium is intact. Visualized intraorbital compartments, and   tympanomastoid cavities appear free of acute disease. There is mild left   maxillary and bilateral ethmoid mucosal thickening.      Brain MRA:  Internal carotid arteries demonstrate normal antegrade flow related   enhancement to the [place] bilaterally. Visualized portions of   the anterior and middle cerebral arteries are within normal limits. The   anterior communicating artery is normal. There is no evidence of   aneurysm, vascular malformation or occlusion.    There is minimal flow related enhancement seen within the right V3   segment. No flow within the right V4 segment is appreciated. 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 The left V4 segment is normal to   the vertebrobasilar confluence. Branch vasculature of the posterior   circulation is within normal limits.      Neck MRA:  Right:  The visualized right common carotid artery isnormal in course   and caliber to the carotid bifurcation. There is no significant stenosis   by NASCET criteria along origin of either internal or external carotid   artery. The right internal carotid artery has normal course and caliber   to the intracranial circulation.    The visualized right vertebral artery is normal in course and caliber to   the intracranial circulation and vertebrobasilar confluence.    Left: The visualized left common carotid artery is normal in course and   caliber to the carotid bifurcation. There is no significant stenosis by   NASCET criteria along origin of either internal or external carotid   artery. The left internal carotid artery has normal course and caliber to   the intracranial circulation.    The visualized left vertebral artery appears small. There is attenuation   of the left vertebral artery lumen which may be congenital or related to   proximal high-grade stenosis to the distal V3 segment. The V4 segment is   not seen which may be due to high-grade stenosis.         IMPRESSION:  Acute versus acute/subacute right paramedian pontine infarction barely   extending to the surface of the [place].  Intracranial MR angiography demonstrates no antegrade flow seen within   the right V4 segment.  Intracranial MR angiography is otherwise within normal limits.  Extracranial MR angiography demonstrates small right vertebral artery   which may be due to congenital hypoplasia versus proximal high-grade   stenosis, due to diminution of flow seen within theV1 through V3 with   loss of flow seen in the V4 segment.      < end of copied text >      Consultant(s) Notes Reviewed:      Care Discussed with Consultants/Other Providers:

## 2018-04-30 NOTE — DISCHARGE NOTE ADULT - COMMUNITY RESOURCES
[facility] ambulance tel# [phone], 4:00pm p/u. [place] at [address] [place] [address].

## 2018-04-30 NOTE — CONSULT NOTE ADULT - ASSESSMENT
Left side weakness  1. PT- bed mobility,transfers, gait and balance training  2. OT- ADL'S  3. CVA-Asa/Plvix   4. Patient would benefit from acute rehab, needs a multidisciplinary team including PT, OT and speech. Can tolerate 3 hours of therapy a day.  Will follow.

## 2018-04-30 NOTE — CONSULT NOTE ADULT - SUBJECTIVE AND OBJECTIVE BOX
HPI:  66 yo Kinyarwanda speaking woman PMH of insulin dependent DM Type II, HTN, mild Dementia, Depression, p/w slurred speechm left sided weakness and numbness x 2 days.  HPI was obtained through interpretation by daughter.  Pt states that left sided weakness and numbness started 2 days ago (Wednesday night). Pt states that she had associated drooling from the left side of her mouth and her tongue felt heavy.  Pt states that her "left leg wouldn't respond" and her left arm felt weak, so she was not able to walk.  She states that she believed it was due to her diabetes and went to sleep. The next morning her daughter called her and she states that the pt's speech was "incomprehensible," so the daughter called an ambulance.  Pt states that the last time she felt normal was Wednesday morning and that she has never had a stroke or these symptoms before. Pt states that left sided weakness has improved and was better yesterday than initial day.  Pt endorses intermittent lightheadedness when ambulating and blurry vision since Wednesday.  Pt denies CP, SOB, N/V/D, fever, chills, diaphoresis, tinnitus, edema, vertigo. She appears comfortable at this time. She is being admitted to telemetry to r/o cva.=> Of note: Pt was prescribed donepezil and sertraline, but ran out and has not refilled her prescription/has not been taking these medications for the past 2-3 months. (27 Apr 2018 11:34)    Interval:  MRI +RT paramedian lou[place] infarct , MRA - V4-V3 stenosis.  TTE P. On Asa/Plavix for 3 months, then Asa.         REVIEW OF SYSTEMS: No chest pain, shortness of breath, nausea, vomiting or diarhea.      PAST MEDICAL & SURGICAL HISTORY  Depression  Mild dementia  DM (diabetes mellitus)  HTN (hypertension)  No pertinent past medical history  S/P cholecystectomy  No significant past surgical history      SOCIAL HISTORY  Smoking - Denied, EtOH - Denied, Drugs - Denied    FUNCTIONAL HISTORY:   Lives alone, + stairs   Independent PTA     CURRENT FUNCTIONAL STATUS: min a       FAMILY HISTORY   Family history of stroke (cerebrovascular) (Sibling)      RECENT LABS/IMAGING  CBC Full  -  ( 30 Apr 2018 07:06 )  WBC Count : 11.28 K/uL  Hemoglobin : 15.5 g/dL  Hematocrit : 47.4 %  Platelet Count - Automated : 218 K/uL  Mean Cell Volume : 87.8 fL  Mean Cell Hemoglobin : 28.7 pg  Mean Cell Hemoglobin Concentration : 32.7 %  Auto Neutrophil # : x  Auto Lymphocyte # : x  Auto Monocyte # : x  Auto Eosinophil # : x  Auto Basophil # : x  Auto Neutrophil % : x  Auto Lymphocyte % : x  Auto Monocyte % : x  Auto Eosinophil % : x  Auto Basophil % : x    04-30    138  |  99  |  9   ----------------------------<  149<H>  4.3   |  28  |  0.74    Ca    9.1      30 Apr 2018 07:06  Mg     2.0     04-30          VITALS  T(C): 36.7 (04-30-18 @ 14:09), Max: 36.9 (04-29-18 @ 21:34)  HR: 72 (04-30-18 @ 14:09) (63 - 77)  BP: 135/79 (04-30-18 @ 14:09) (135/79 - 183/88)  RR: 16 (04-30-18 @ 14:09) (16 - 18)  SpO2: 98% (04-30-18 @ 14:09) (97% - 99%)  Wt(kg): --    ALLERGIES  No Known Allergies      MEDICATIONS   acetaminophen   Tablet. 650 milliGRAM(s) Oral every 6 hours PRN  aspirin enteric coated 81 milliGRAM(s) Oral daily  atorvastatin 80 milliGRAM(s) Oral at bedtime  clopidogrel Tablet 75 milliGRAM(s) Oral daily  dextrose 5%. 1000 milliLiter(s) IV Continuous <Continuous>  dextrose 50% Injectable 12.5 Gram(s) IV Push once  dextrose 50% Injectable 25 Gram(s) IV Push once  dextrose 50% Injectable 25 Gram(s) IV Push once  dextrose Gel 1 Dose(s) Oral once PRN  donepezil 10 milliGRAM(s) Oral at bedtime  enoxaparin Injectable 30 milliGRAM(s) SubCutaneous every 24 hours  glucagon  Injectable 1 milliGRAM(s) IntraMuscular once PRN  insulin glargine Injectable (LANTUS) 40 Unit(s) SubCutaneous at bedtime  insulin lispro (HumaLOG) corrective regimen sliding scale   SubCutaneous three times a day before meals  insulin lispro (HumaLOG) corrective regimen sliding scale   SubCutaneous at bedtime  insulin lispro Injectable (HumaLOG) 17 Unit(s) SubCutaneous three times a day before meals  lisinopril 40 milliGRAM(s) Oral daily  sertraline 100 milliGRAM(s) Oral daily      ----------------------------------------------------------------------------------------  PHYSICAL EXAM  Constitutional - NAD, Comfortable  HEENT - NCAT, EOMI  Neck - Supple, No limited ROM  Chest - CTA bilaterally, No wheeze, No rhonchi, No crackles  Cardiovascular - RRR, S1S2, No murmurs  Abdomen - BS+, Soft, NTND  Extremities - No C/C/E, No calf tenderness   Neurologic Exam -                    Cognitive - Awake, Alert, AAO to self, place, date, year, situation     Communication - Fluent, No dysarthria, no aphasia     Cranial Nerves - CN 2-12 intact     Motor - LUE/LLE 4/5, RUE/RLE 5/5                       Sensory - Intact to LT     Reflexes - DTR Intact, No primitive reflexive     Balance - WNL Static  Psychiatric - Mood stable, Affect WNL

## 2018-04-30 NOTE — PROGRESS NOTE ADULT - PROBLEM SELECTOR PLAN 3
Hgba1c 9.1 uncontrolled. Pt has hx of diabetes complicated by retinopathy  lower humalog with meals to 17 given FS 72   ISS for coverage  Diabetic diet.

## 2018-04-30 NOTE — DISCHARGE NOTE ADULT - MEDICATION SUMMARY - MEDICATIONS TO STOP TAKING
I will STOP taking the medications listed below when I get home from the hospital:    atenolol 50 mg oral tablet  -- 1 tab(s) by mouth 2 times a day    furosemide 20 mg oral tablet  -- 1 tab(s) by mouth once a day

## 2018-04-30 NOTE — DISCHARGE NOTE ADULT - PATIENT PORTAL LINK FT
You can access the [facility] Patient Portal, offered by [facility], by registering with the following website: [email or web address]

## 2018-04-30 NOTE — DISCHARGE NOTE ADULT - CARE PROVIDERS DIRECT ADDRESSES
[email or web address]

## 2018-05-01 LAB
BUN SERPL-MCNC: 19 MG/DL — SIGNIFICANT CHANGE UP (ref 7–23)
CALCIUM SERPL-MCNC: 8.9 MG/DL — SIGNIFICANT CHANGE UP (ref 8.4–10.5)
CHLORIDE SERPL-SCNC: 100 MMOL/L — SIGNIFICANT CHANGE UP (ref 98–107)
CO2 SERPL-SCNC: 26 MMOL/L — SIGNIFICANT CHANGE UP (ref 22–31)
CREAT SERPL-MCNC: 1.01 MG/DL — SIGNIFICANT CHANGE UP (ref 0.5–1.3)
GLUCOSE SERPL-MCNC: 269 MG/DL — HIGH (ref 70–99)
HCT VFR BLD CALC: 45.3 % — HIGH (ref 34.5–45)
HGB BLD-MCNC: 14.8 G/DL — SIGNIFICANT CHANGE UP (ref 11.5–15.5)
MCHC RBC-ENTMCNC: 29.1 PG — SIGNIFICANT CHANGE UP (ref 27–34)
MCHC RBC-ENTMCNC: 32.7 % — SIGNIFICANT CHANGE UP (ref 32–36)
MCV RBC AUTO: 89 FL — SIGNIFICANT CHANGE UP (ref 80–100)
NRBC # FLD: 0 — SIGNIFICANT CHANGE UP
PLATELET # BLD AUTO: 228 K/UL — SIGNIFICANT CHANGE UP (ref 150–400)
PMV BLD: 10.5 FL — SIGNIFICANT CHANGE UP (ref 7–13)
POTASSIUM SERPL-MCNC: 4.6 MMOL/L — SIGNIFICANT CHANGE UP (ref 3.5–5.3)
POTASSIUM SERPL-SCNC: 4.6 MMOL/L — SIGNIFICANT CHANGE UP (ref 3.5–5.3)
RBC # BLD: 5.09 M/UL — SIGNIFICANT CHANGE UP (ref 3.8–5.2)
RBC # FLD: 11.9 % — SIGNIFICANT CHANGE UP (ref 10.3–14.5)
SODIUM SERPL-SCNC: 138 MMOL/L — SIGNIFICANT CHANGE UP (ref 135–145)
WBC # BLD: 9.14 K/UL — SIGNIFICANT CHANGE UP (ref 3.8–10.5)
WBC # FLD AUTO: 9.14 K/UL — SIGNIFICANT CHANGE UP (ref 3.8–10.5)

## 2018-05-01 PROCEDURE — 99232 SBSQ HOSP IP/OBS MODERATE 35: CPT

## 2018-05-01 RX ORDER — INSULIN LISPRO 100/ML
15 VIAL (ML) SUBCUTANEOUS
Qty: 0 | Refills: 0 | Status: DISCONTINUED | OUTPATIENT
Start: 2018-05-01 | End: 2018-05-03

## 2018-05-01 RX ADMIN — LISINOPRIL 40 MILLIGRAM(S): 2.5 TABLET ORAL at 05:04

## 2018-05-01 RX ADMIN — Medication 17 UNIT(S): at 09:10

## 2018-05-01 RX ADMIN — Medication 2: at 09:09

## 2018-05-01 RX ADMIN — Medication 15 UNIT(S): at 18:01

## 2018-05-01 RX ADMIN — CLOPIDOGREL BISULFATE 75 MILLIGRAM(S): 75 TABLET, FILM COATED ORAL at 12:50

## 2018-05-01 RX ADMIN — ATORVASTATIN CALCIUM 80 MILLIGRAM(S): 80 TABLET, FILM COATED ORAL at 21:02

## 2018-05-01 RX ADMIN — Medication 81 MILLIGRAM(S): at 12:50

## 2018-05-01 RX ADMIN — Medication 2: at 18:00

## 2018-05-01 RX ADMIN — INSULIN GLARGINE 40 UNIT(S): 100 INJECTION, SOLUTION SUBCUTANEOUS at 22:13

## 2018-05-01 RX ADMIN — SERTRALINE 100 MILLIGRAM(S): 25 TABLET, FILM COATED ORAL at 12:50

## 2018-05-01 RX ADMIN — DONEPEZIL HYDROCHLORIDE 10 MILLIGRAM(S): 10 TABLET, FILM COATED ORAL at 21:02

## 2018-05-01 RX ADMIN — ENOXAPARIN SODIUM 30 MILLIGRAM(S): 100 INJECTION SUBCUTANEOUS at 12:50

## 2018-05-01 NOTE — PROGRESS NOTE ADULT - SUBJECTIVE AND OBJECTIVE BOX
Patient is a 67y old  Female who presents with a chief complaint of Left sided weakness/numbness (30 Apr 2018 08:25)      SUBJECTIVE / OVERNIGHT EVENTS: Pt in NAD was able to walk with assistance today denies CP/SOB/N/V    MEDICATIONS  (STANDING):  aspirin enteric coated 81 milliGRAM(s) Oral daily  atorvastatin 80 milliGRAM(s) Oral at bedtime  clopidogrel Tablet 75 milliGRAM(s) Oral daily  dextrose 5%. 1000 milliLiter(s) (50 mL/Hr) IV Continuous <Continuous>  dextrose 50% Injectable 12.5 Gram(s) IV Push once  dextrose 50% Injectable 25 Gram(s) IV Push once  dextrose 50% Injectable 25 Gram(s) IV Push once  donepezil 10 milliGRAM(s) Oral at bedtime  enoxaparin Injectable 30 milliGRAM(s) SubCutaneous every 24 hours  insulin glargine Injectable (LANTUS) 40 Unit(s) SubCutaneous at bedtime  insulin lispro (HumaLOG) corrective regimen sliding scale   SubCutaneous three times a day before meals  insulin lispro (HumaLOG) corrective regimen sliding scale   SubCutaneous at bedtime  insulin lispro Injectable (HumaLOG) 17 Unit(s) SubCutaneous three times a day before meals  lisinopril 40 milliGRAM(s) Oral daily  sertraline 100 milliGRAM(s) Oral daily    MEDICATIONS  (PRN):  acetaminophen   Tablet. 650 milliGRAM(s) Oral every 6 hours PRN mild, moderate pain  dextrose Gel 1 Dose(s) Oral once PRN Blood Glucose LESS THAN 70 milliGRAM(s)/deciliter  glucagon  Injectable 1 milliGRAM(s) IntraMuscular once PRN Glucose LESS THAN 70 milligrams/deciliter        CAPILLARY BLOOD GLUCOSE      POCT Blood Glucose.: 195 mg/dL (01 May 2018 08:37)  POCT Blood Glucose.: 118 mg/dL (30 Apr 2018 21:40)  POCT Blood Glucose.: 213 mg/dL (30 Apr 2018 17:57)  POCT Blood Glucose.: 83 mg/dL (30 Apr 2018 13:10)    I&O's Summary      T(C): 36.7 (05-01-18 @ 05:03), Max: 36.8 (04-30-18 @ 20:58)  HR: 67 (05-01-18 @ 05:03) (67 - 72)  BP: 173/66 (05-01-18 @ 05:03) (135/79 - 173/66)  RR: 16 (05-01-18 @ 05:03) (16 - 16)  SpO2: 94% (05-01-18 @ 05:03) (94% - 98%)    PHYSICAL EXAM:  GENERAL: NAD, well-developed  HEAD:  Atraumatic, Normocephalic  EYES: EOMI, PERRLA, conjunctiva and sclera clear  NECK: Supple, No JVD  CHEST/LUNG: Clear to auscultation bilaterally; No wheeze  HEART: Regular rate and rhythm; No murmurs, rubs, or gallops  ABDOMEN: Soft, Nontender, Nondistended; Bowel sounds present  EXTREMITIES:  2+ Peripheral Pulses, No clubbing, cyanosis, or edema  Neuro: mild LT hemiparesis    LABS:                        14.8   9.14  )-----------( 228      ( 01 May 2018 06:50 )             45.3     05-01    138  |  100  |  19  ----------------------------<  269<H>  4.6   |  26  |  1.01    Ca    8.9      01 May 2018 06:50  Mg     2.0     04-30                  RADIOLOGY & ADDITIONAL TESTS:    Imaging Personally Reviewed:    Consultant(s) Notes Reviewed:      Care Discussed with Consultants/Other Providers:

## 2018-05-01 NOTE — PROGRESS NOTE ADULT - PROBLEM SELECTOR PLAN 2
+orthostatic does not appear dehydrated on exam likely related to autonomic dsyfunction from age and DM would keep BP approx 150 will restart lisinopril hold norvasc and lasix  -repeat orthostatics today

## 2018-05-01 NOTE — PROGRESS NOTE ADULT - PROBLEM SELECTOR PLAN 3
Hgba1c 9.1 uncontrolled. Pt has hx of diabetes complicated by retinopathy  lower humalog with meals to 17   -FS no further hypoglycemia  ISS for coverage  Diabetic diet.

## 2018-05-02 DIAGNOSIS — E78.5 HYPERLIPIDEMIA, UNSPECIFIED: ICD-10-CM

## 2018-05-02 LAB
BUN SERPL-MCNC: 22 MG/DL — SIGNIFICANT CHANGE UP (ref 7–23)
CALCIUM SERPL-MCNC: 9.1 MG/DL — SIGNIFICANT CHANGE UP (ref 8.4–10.5)
CHLORIDE SERPL-SCNC: 99 MMOL/L — SIGNIFICANT CHANGE UP (ref 98–107)
CHLORIDE UR-SCNC: 105 MMOL/L — SIGNIFICANT CHANGE UP
CO2 SERPL-SCNC: 27 MMOL/L — SIGNIFICANT CHANGE UP (ref 22–31)
CREAT ?TM UR-MCNC: 195.39 MG/DL — SIGNIFICANT CHANGE UP
CREAT SERPL-MCNC: 0.86 MG/DL — SIGNIFICANT CHANGE UP (ref 0.5–1.3)
GLUCOSE SERPL-MCNC: 152 MG/DL — HIGH (ref 70–99)
HCT VFR BLD CALC: 46.6 % — HIGH (ref 34.5–45)
HGB BLD-MCNC: 14.8 G/DL — SIGNIFICANT CHANGE UP (ref 11.5–15.5)
MCHC RBC-ENTMCNC: 28.4 PG — SIGNIFICANT CHANGE UP (ref 27–34)
MCHC RBC-ENTMCNC: 31.8 % — LOW (ref 32–36)
MCV RBC AUTO: 89.3 FL — SIGNIFICANT CHANGE UP (ref 80–100)
NRBC # FLD: 0 — SIGNIFICANT CHANGE UP
PLATELET # BLD AUTO: 238 K/UL — SIGNIFICANT CHANGE UP (ref 150–400)
PMV BLD: 10.4 FL — SIGNIFICANT CHANGE UP (ref 7–13)
POTASSIUM SERPL-MCNC: 4.5 MMOL/L — SIGNIFICANT CHANGE UP (ref 3.5–5.3)
POTASSIUM SERPL-SCNC: 4.5 MMOL/L — SIGNIFICANT CHANGE UP (ref 3.5–5.3)
POTASSIUM UR-SCNC: > 100 MMOL/L — SIGNIFICANT CHANGE UP
RBC # BLD: 5.22 M/UL — HIGH (ref 3.8–5.2)
RBC # FLD: 11.9 % — SIGNIFICANT CHANGE UP (ref 10.3–14.5)
SODIUM SERPL-SCNC: 138 MMOL/L — SIGNIFICANT CHANGE UP (ref 135–145)
SODIUM UR-SCNC: 79 MMOL/L — SIGNIFICANT CHANGE UP
UUN UR-MCNC: 1157.7 MG/DL — SIGNIFICANT CHANGE UP
WBC # BLD: 9.4 K/UL — SIGNIFICANT CHANGE UP (ref 3.8–10.5)
WBC # FLD AUTO: 9.4 K/UL — SIGNIFICANT CHANGE UP (ref 3.8–10.5)

## 2018-05-02 PROCEDURE — 99233 SBSQ HOSP IP/OBS HIGH 50: CPT

## 2018-05-02 PROCEDURE — 99223 1ST HOSP IP/OBS HIGH 75: CPT

## 2018-05-02 PROCEDURE — 99232 SBSQ HOSP IP/OBS MODERATE 35: CPT | Mod: GC

## 2018-05-02 PROCEDURE — 93306 TTE W/DOPPLER COMPLETE: CPT | Mod: 26

## 2018-05-02 RX ORDER — SODIUM CHLORIDE 9 MG/ML
1000 INJECTION INTRAMUSCULAR; INTRAVENOUS; SUBCUTANEOUS
Qty: 0 | Refills: 0 | Status: DISCONTINUED | OUTPATIENT
Start: 2018-05-02 | End: 2018-05-04

## 2018-05-02 RX ORDER — GABAPENTIN 400 MG/1
100 CAPSULE ORAL DAILY
Qty: 0 | Refills: 0 | Status: DISCONTINUED | OUTPATIENT
Start: 2018-05-02 | End: 2018-05-08

## 2018-05-02 RX ORDER — INSULIN LISPRO 100/ML
VIAL (ML) SUBCUTANEOUS
Qty: 0 | Refills: 0 | Status: DISCONTINUED | OUTPATIENT
Start: 2018-05-02 | End: 2018-05-08

## 2018-05-02 RX ADMIN — SERTRALINE 100 MILLIGRAM(S): 25 TABLET, FILM COATED ORAL at 11:56

## 2018-05-02 RX ADMIN — Medication 81 MILLIGRAM(S): at 11:55

## 2018-05-02 RX ADMIN — CLOPIDOGREL BISULFATE 75 MILLIGRAM(S): 75 TABLET, FILM COATED ORAL at 11:55

## 2018-05-02 RX ADMIN — INSULIN GLARGINE 40 UNIT(S): 100 INJECTION, SOLUTION SUBCUTANEOUS at 21:58

## 2018-05-02 RX ADMIN — ENOXAPARIN SODIUM 30 MILLIGRAM(S): 100 INJECTION SUBCUTANEOUS at 11:56

## 2018-05-02 RX ADMIN — LISINOPRIL 40 MILLIGRAM(S): 2.5 TABLET ORAL at 05:50

## 2018-05-02 RX ADMIN — ATORVASTATIN CALCIUM 80 MILLIGRAM(S): 80 TABLET, FILM COATED ORAL at 21:21

## 2018-05-02 RX ADMIN — GABAPENTIN 100 MILLIGRAM(S): 400 CAPSULE ORAL at 17:55

## 2018-05-02 RX ADMIN — DONEPEZIL HYDROCHLORIDE 10 MILLIGRAM(S): 10 TABLET, FILM COATED ORAL at 21:21

## 2018-05-02 RX ADMIN — Medication 15 UNIT(S): at 09:50

## 2018-05-02 RX ADMIN — SODIUM CHLORIDE 75 MILLILITER(S): 9 INJECTION INTRAMUSCULAR; INTRAVENOUS; SUBCUTANEOUS at 16:47

## 2018-05-02 RX ADMIN — Medication 15 UNIT(S): at 13:43

## 2018-05-02 RX ADMIN — Medication 2: at 09:51

## 2018-05-02 NOTE — PROGRESS NOTE ADULT - SUBJECTIVE AND OBJECTIVE BOX
HPI:  68 yo Chinese speaking woman PMH of insulin dependent DM Type II, HTN, mild Dementia, Depression, p/w slurred speechm left sided weakness and numbness x 2 days.  HPI was obtained through interpretation by daughter.  Pt states that left sided weakness and numbness started 2 days ago (Wednesday night). Pt states that she had associated drooling from the left side of her mouth and her tongue felt heavy.  Pt states that her "left leg wouldn't respond" and her left arm felt weak, so she was not able to walk.  She states that she believed it was due to her diabetes and went to sleep. The next morning her daughter called her and she states that the pt's speech was "incomprehensible," so the daughter called an ambulance.  Pt states that the last time she felt normal was Wednesday morning and that she has never had a stroke or these symptoms before. Pt states that left sided weakness has improved and was better yesterday than initial day.  Pt endorses intermittent lightheadedness when ambulating and blurry vision since Wednesday.  Pt denies CP, SOB, N/V/D, fever, chills, diaphoresis, tinnitus, edema, vertigo. She appears comfortable at this time. She is being admitted to telemetry to r/o cva.=> Of note: Pt was prescribed donepezil and sertraline, but ran out and has not refilled her prescription/has not been taking these medications for the past 2-3 months. (27 Apr 2018 11:34)    Interval:  MRI +RT paramedian louis infarct , On Asa/Plavix for 3 months, then Asa. Doing well, + pain in b/l feet, + burning, started on Gabapentin. Tolerating PT well         REVIEW OF SYSTEMS: No chest pain, shortness of breath, nausea, vomiting or diarhea.          CURRENT FUNCTIONAL STATUS: min a   MEDICATIONS  (STANDING):  aspirin enteric coated 81 milliGRAM(s) Oral daily  atorvastatin 80 milliGRAM(s) Oral at bedtime  clopidogrel Tablet 75 milliGRAM(s) Oral daily  dextrose 5%. 1000 milliLiter(s) (50 mL/Hr) IV Continuous <Continuous>  dextrose 50% Injectable 12.5 Gram(s) IV Push once  dextrose 50% Injectable 25 Gram(s) IV Push once  dextrose 50% Injectable 25 Gram(s) IV Push once  donepezil 10 milliGRAM(s) Oral at bedtime  enoxaparin Injectable 30 milliGRAM(s) SubCutaneous every 24 hours  gabapentin 100 milliGRAM(s) Oral daily  insulin glargine Injectable (LANTUS) 40 Unit(s) SubCutaneous at bedtime  insulin lispro (HumaLOG) corrective regimen sliding scale   SubCutaneous at bedtime  insulin lispro (HumaLOG) corrective regimen sliding scale   SubCutaneous three times a day before meals  insulin lispro Injectable (HumaLOG) 15 Unit(s) SubCutaneous three times a day before meals  lisinopril 40 milliGRAM(s) Oral daily  sertraline 100 milliGRAM(s) Oral daily  sodium chloride 0.9%. 1000 milliLiter(s) (75 mL/Hr) IV Continuous <Continuous>    MEDICATIONS  (PRN):  acetaminophen   Tablet. 650 milliGRAM(s) Oral every 6 hours PRN mild, moderate pain  dextrose Gel 1 Dose(s) Oral once PRN Blood Glucose LESS THAN 70 milliGRAM(s)/deciliter  glucagon  Injectable 1 milliGRAM(s) IntraMuscular once PRN Glucose LESS THAN 70 milligrams/deciliter    Vital Signs Last 24 Hrs  T(C): 36.8 (02 May 2018 14:10), Max: 36.9 (01 May 2018 19:45)  T(F): 98.2 (02 May 2018 14:10), Max: 98.5 (01 May 2018 19:45)  HR: 70 (02 May 2018 14:10) (65 - 72)  BP: 189/76 (02 May 2018 14:10) (149/68 - 189/76)  BP(mean): --  RR: 16 (02 May 2018 14:10) (16 - 18)  SpO2: 95% (02 May 2018 14:10) (95% - 96%)    ----------------------------------------------------------------------------------------  PHYSICAL EXAM  Constitutional - NAD, Comfortable  HEENT - NCAT, EOMI  Neck - Supple, No limited ROM  Chest - CTA bilaterally, No wheeze, No rhonchi, No crackles  Cardiovascular - RRR, S1S2, No murmurs  Abdomen - BS+, Soft, NTND  Extremities - No C/C/E, No calf tenderness   Neurologic Exam -                    Cognitive - Awake, Alert, AAO to self, place, date, year, situation     Communication - Fluent, No dysarthria, no aphasia     Cranial Nerves - CN 2-12 intact     Motor - LUE/LLE 4/5, RUE/RLE 5/5                       Sensory - Intact to LT     Reflexes - DTR Intact, No primitive reflexive     Balance - WNL Static  Psychiatric - Mood stable, Affect WNL

## 2018-05-02 NOTE — PROGRESS NOTE ADULT - PROBLEM SELECTOR PLAN 3
Hgba1c 9.1 uncontrolled. Pt has hx of diabetes complicated by retinopathy  lower humalog with meals to 15  -monitor FS  ISS for coverage  Diabetic diet.  -+b/l LE tingling start gabapaentin 100

## 2018-05-02 NOTE — PROGRESS NOTE ADULT - SUBJECTIVE AND OBJECTIVE BOX
Patient is a 67y old  Female who presents with a chief complaint of Left sided weakness/numbness (30 Apr 2018 08:25)      SUBJECTIVE / OVERNIGHT EVENTS: +orthostatic today with symptoms states eating well. +hypoglycemia humalog decreased    MEDICATIONS  (STANDING):  aspirin enteric coated 81 milliGRAM(s) Oral daily  atorvastatin 80 milliGRAM(s) Oral at bedtime  clopidogrel Tablet 75 milliGRAM(s) Oral daily  dextrose 5%. 1000 milliLiter(s) (50 mL/Hr) IV Continuous <Continuous>  dextrose 50% Injectable 12.5 Gram(s) IV Push once  dextrose 50% Injectable 25 Gram(s) IV Push once  dextrose 50% Injectable 25 Gram(s) IV Push once  donepezil 10 milliGRAM(s) Oral at bedtime  enoxaparin Injectable 30 milliGRAM(s) SubCutaneous every 24 hours  insulin glargine Injectable (LANTUS) 40 Unit(s) SubCutaneous at bedtime  insulin lispro (HumaLOG) corrective regimen sliding scale   SubCutaneous at bedtime  insulin lispro (HumaLOG) corrective regimen sliding scale   SubCutaneous three times a day before meals  insulin lispro Injectable (HumaLOG) 15 Unit(s) SubCutaneous three times a day before meals  lisinopril 40 milliGRAM(s) Oral daily  sertraline 100 milliGRAM(s) Oral daily    MEDICATIONS  (PRN):  acetaminophen   Tablet. 650 milliGRAM(s) Oral every 6 hours PRN mild, moderate pain  dextrose Gel 1 Dose(s) Oral once PRN Blood Glucose LESS THAN 70 milliGRAM(s)/deciliter  glucagon  Injectable 1 milliGRAM(s) IntraMuscular once PRN Glucose LESS THAN 70 milligrams/deciliter        CAPILLARY BLOOD GLUCOSE      POCT Blood Glucose.: 107 mg/dL (02 May 2018 12:16)  POCT Blood Glucose.: 167 mg/dL (02 May 2018 08:58)  POCT Blood Glucose.: 155 mg/dL (01 May 2018 22:06)  POCT Blood Glucose.: 171 mg/dL (01 May 2018 17:50)  POCT Blood Glucose.: 118 mg/dL (01 May 2018 14:01)  POCT Blood Glucose.: 94 mg/dL (01 May 2018 13:28)    I&O's Summary      T(C): 36.9 (05-01-18 @ 19:45), Max: 36.9 (05-01-18 @ 19:45)  HR: 65 (05-02-18 @ 11:53) (65 - 72)  BP: 176/65 (05-02-18 @ 11:53) (142/65 - 176/65)  RR: 18 (05-02-18 @ 11:53) (16 - 18)  SpO2: 96% (05-02-18 @ 11:53) (95% - 96%)    PHYSICAL EXAM:  GENERAL: NAD, well-developed  HEAD:  Atraumatic, Normocephalic  EYES: EOMI, PERRLA, conjunctiva and sclera clear  NECK: Supple, No JVD  CHEST/LUNG: Clear to auscultation bilaterally; No wheeze  HEART: Regular rate and rhythm; No murmurs, rubs, or gallops  ABDOMEN: Soft, Nontender, Nondistended; Bowel sounds present  EXTREMITIES:  2+ Peripheral Pulses, No clubbing, cyanosis, or edema  Neuro: mild LT hemiparesis    LABS:                        14.8   9.40  )-----------( 238      ( 02 May 2018 05:32 )             46.6     05-02    138  |  99  |  22  ----------------------------<  152<H>  4.5   |  27  |  0.86    Ca    9.1      02 May 2018 05:14                  RADIOLOGY & ADDITIONAL TESTS:    Imaging Personally Reviewed:    Consultant(s) Notes Reviewed:      Care Discussed with Consultants/Other Providers: Patient is a 67y old  Female who presents with a chief complaint of Left sided weakness/numbness (30 Apr 2018 08:25)      SUBJECTIVE / OVERNIGHT EVENTS: +orthostatic today with symptoms states eating well. +hypoglycemia humalog decreased +tingling sensation in b/l feet    MEDICATIONS  (STANDING):  aspirin enteric coated 81 milliGRAM(s) Oral daily  atorvastatin 80 milliGRAM(s) Oral at bedtime  clopidogrel Tablet 75 milliGRAM(s) Oral daily  dextrose 5%. 1000 milliLiter(s) (50 mL/Hr) IV Continuous <Continuous>  dextrose 50% Injectable 12.5 Gram(s) IV Push once  dextrose 50% Injectable 25 Gram(s) IV Push once  dextrose 50% Injectable 25 Gram(s) IV Push once  donepezil 10 milliGRAM(s) Oral at bedtime  enoxaparin Injectable 30 milliGRAM(s) SubCutaneous every 24 hours  insulin glargine Injectable (LANTUS) 40 Unit(s) SubCutaneous at bedtime  insulin lispro (HumaLOG) corrective regimen sliding scale   SubCutaneous at bedtime  insulin lispro (HumaLOG) corrective regimen sliding scale   SubCutaneous three times a day before meals  insulin lispro Injectable (HumaLOG) 15 Unit(s) SubCutaneous three times a day before meals  lisinopril 40 milliGRAM(s) Oral daily  sertraline 100 milliGRAM(s) Oral daily    MEDICATIONS  (PRN):  acetaminophen   Tablet. 650 milliGRAM(s) Oral every 6 hours PRN mild, moderate pain  dextrose Gel 1 Dose(s) Oral once PRN Blood Glucose LESS THAN 70 milliGRAM(s)/deciliter  glucagon  Injectable 1 milliGRAM(s) IntraMuscular once PRN Glucose LESS THAN 70 milligrams/deciliter        CAPILLARY BLOOD GLUCOSE      POCT Blood Glucose.: 107 mg/dL (02 May 2018 12:16)  POCT Blood Glucose.: 167 mg/dL (02 May 2018 08:58)  POCT Blood Glucose.: 155 mg/dL (01 May 2018 22:06)  POCT Blood Glucose.: 171 mg/dL (01 May 2018 17:50)  POCT Blood Glucose.: 118 mg/dL (01 May 2018 14:01)  POCT Blood Glucose.: 94 mg/dL (01 May 2018 13:28)    I&O's Summary      T(C): 36.9 (05-01-18 @ 19:45), Max: 36.9 (05-01-18 @ 19:45)  HR: 65 (05-02-18 @ 11:53) (65 - 72)  BP: 176/65 (05-02-18 @ 11:53) (142/65 - 176/65)  RR: 18 (05-02-18 @ 11:53) (16 - 18)  SpO2: 96% (05-02-18 @ 11:53) (95% - 96%)    PHYSICAL EXAM:  GENERAL: NAD, well-developed  HEAD:  Atraumatic, Normocephalic  EYES: EOMI, PERRLA, conjunctiva and sclera clear  NECK: Supple, No JVD  CHEST/LUNG: Clear to auscultation bilaterally; No wheeze  HEART: Regular rate and rhythm; No murmurs, rubs, or gallops  ABDOMEN: Soft, Nontender, Nondistended; Bowel sounds present  EXTREMITIES:  2+ Peripheral Pulses, No clubbing, cyanosis, or edema  Neuro: mild LT hemiparesis    LABS:                        14.8   9.40  )-----------( 238      ( 02 May 2018 05:32 )             46.6     05-02    138  |  99  |  22  ----------------------------<  152<H>  4.5   |  27  |  0.86    Ca    9.1      02 May 2018 05:14                  RADIOLOGY & ADDITIONAL TESTS:    Imaging Personally Reviewed:    Consultant(s) Notes Reviewed:      Care Discussed with Consultants/Other Providers:

## 2018-05-02 NOTE — PROGRESS NOTE ADULT - PROBLEM SELECTOR PLAN 2
+orthostatic does not appear dehydrated on exam likely related to autonomic dsyfunction from age and DM would keep BP approx 150 will restart lisinopril hold norvasc and lasix  -repeat orthostatics today + will check urine lytes and if pre renal will give IVF bolus 500 ml

## 2018-05-02 NOTE — CONSULT NOTE ADULT - PROBLEM SELECTOR RECOMMENDATION 2
-BP goal of M 130/80 if there are no other contraindications.  Defer to primary care team to optimize her medicines

## 2018-05-02 NOTE — CONSULT NOTE ADULT - PROBLEM SELECTOR RECOMMENDATION 3
-statin if no other contraindications    Discharge: basal/bolus if she is able to demonstrate safe injection technique. Provider to RN for teach back ordered

## 2018-05-02 NOTE — CONSULT NOTE ADULT - PROBLEM SELECTOR RECOMMENDATION 9
-Patient has uncontrolled diabetes.  There is some non-adherence to novolog for meals which may be a contributing factor.  In addition, she is non-adherent to carbohydrate sugar limited diet.  She lives alone and in the EMR there is documentation of dementia. As someone who lives alone unsure how she is able to manage for her diabetes.  Based on her recall she has hyper and hypoglycemic excursions  -for now, continue Lantus 40 units at bedtime and humalog 15 TID with meals  -nutrition consult  -change moderate correction sliding scale to a low dose sliding scale based on her hypoglycemic on admission  -target glucose 100-180 while avoiding hypoglycemia  -follow up with her outpatient endocrinologist

## 2018-05-02 NOTE — CONSULT NOTE ADULT - SUBJECTIVE AND OBJECTIVE BOX
HPI:  68 yo Macedonian speaking woman PMH of insulin dependent DM Type II, HTN, mild Dementia, Depression, p/w slurred speechm left sided weakness and numbness x 2 days.  HPI was obtained through interpretation by daughter.  Pt states that left sided weakness and numbness started 2 days ago (Wednesday night). Pt states that she had associated drooling from the left side of her mouth and her tongue felt heavy.  Pt states that her "left leg wouldn't respond" and her left arm felt weak, so she was not able to walk.  She states that she believed it was due to her diabetes and went to sleep. The next morning her daughter called her and she states that the pt's speech was "incomprehensible," so the daughter called an ambulance.  Pt states that the last time she felt normal was Wednesday morning and that she has never had a stroke or these symptoms before. Pt states that left sided weakness has improved and was better yesterday than initial day.  Pt endorses intermittent lightheadedness when ambulating and blurry vision since Wednesday.  Pt denies CP, SOB, N/V/D, fever, chills, diaphoresis, tinnitus, edema, vertigo. She appears comfortable at this time. She is being admitted to telemetry to r/o cva.    => Of note: Pt was prescribed donepezil and sertraline, but ran out and has not refilled her prescription/has not been taking these medications for the past 2-3 months. (27 Apr 2018 11:34)    Endocrine History.  History obtained via [facility].  ID # [id number]  68 yo woman with uncontrolled T2DM (A1c of 9.1%) complicated by self-reported neuropathy, hypoglycemia, HTN, HLD admitted with CVA.  She has had T2DM for over 35 years.  She has a female endocrinologist in [place] but "can't remember the name" whom she sees every three months.  Has a glucometer at home and checks intermittently throughout the day.  Her values have ranged from 200-300 but has seen 400's in the past. In addition, she can have hypoglycemia to the 70's. Her regimen consists of Lantus 50  units, Novolog 18 units TID with meals and Metformin 1000 mg BID.  She admits that there are days where she only takes one dose of insulin.  If she is out of the house she forgets to bring insulin with her.    Dilated eye exam 2 years ago  Diet: liberal.  She tries to limit rice but has bananas, meats, vegetables.  At her bedside are Ritz Crackers, a bag of Cheerios and bananas  + Neuropathy  Lives alone    PAST MEDICAL & SURGICAL HISTORY:  Depression  Mild dementia  DM (diabetes mellitus)  HTN (hypertension)  S/P cholecystectomy    FAMILY HISTORY:  Family history of stroke (cerebrovascular) (Sibling)  Paternal side of the family has diabetes    Social History:  Denies smoking/alcohol/recreational drugs    Outpatient Medications:  · 	atenolol 50 mg oral tablet: 1 tab(s) orally 2 times a day, Last Dose Taken:    · 	lisinopril 40 mg oral tablet: 1 tab(s) orally once a day, Last Dose Taken:    · 	furosemide 20 mg oral tablet: 1 tab(s) orally once a day, Last Dose Taken:    · 	atorvastatin 40 mg oral tablet: 1 tab(s) orally once a day, Last Dose Taken:    · 	amLODIPine 2.5 mg oral tablet: 1 tab(s) orally once a day, Last Dose Taken:    · 	donepezil 10 mg oral tablet: 1 tab(s) orally once a day (at bedtime), Last Dose Taken:    · 	sertraline 100 mg oral tablet: 1 tab(s) orally once a day, Last Dose Taken:    · 	NovoLOG 100 units/mL subcutaneous solution: 18 unit(s) subcutaneous 3 times a day (before meals), Last Dose Taken:    · 	Basaglar KwikPen 100 units/mL subcutaneous solution: 50 unit(s) subcutaneous once a day (at bedtime), Last Dose Taken:      MEDICATIONS  (STANDING):  aspirin enteric coated 81 milliGRAM(s) Oral daily  atorvastatin 80 milliGRAM(s) Oral at bedtime  clopidogrel Tablet 75 milliGRAM(s) Oral daily  dextrose 5%. 1000 milliLiter(s) (50 mL/Hr) IV Continuous <Continuous>  dextrose 50% Injectable 12.5 Gram(s) IV Push once  dextrose 50% Injectable 25 Gram(s) IV Push once  dextrose 50% Injectable 25 Gram(s) IV Push once  donepezil 10 milliGRAM(s) Oral at bedtime  enoxaparin Injectable 30 milliGRAM(s) SubCutaneous every 24 hours  insulin glargine Injectable (LANTUS) 40 Unit(s) SubCutaneous at bedtime  insulin lispro (HumaLOG) corrective regimen sliding scale   SubCutaneous three times a day before meals  insulin lispro (HumaLOG) corrective regimen sliding scale   SubCutaneous at bedtime  insulin lispro Injectable (HumaLOG) 15 Unit(s) SubCutaneous three times a day before meals  lisinopril 40 milliGRAM(s) Oral daily  sertraline 100 milliGRAM(s) Oral daily    MEDICATIONS  (PRN):  acetaminophen   Tablet. 650 milliGRAM(s) Oral every 6 hours PRN mild, moderate pain  dextrose Gel 1 Dose(s) Oral once PRN Blood Glucose LESS THAN 70 milliGRAM(s)/deciliter  glucagon  Injectable 1 milliGRAM(s) IntraMuscular once PRN Glucose LESS THAN 70 milligrams/deciliter    Allergies  No Known Allergies    Review of Systems:  Constitutional: No fever  Eyes: + blurry vision  HEENT: No pain  Cardiovascular: No chest pain, palpitations  Respiratory: No SOB, no cough  GI: No nausea, vomiting, abdominal pain  : No dysuria  Skin: no rash  Psych: no depression  Endocrine: no polyuria, polydipsia  ALL OTHER SYSTEMS REVIEWED AND NEGATIVE    PHYSICAL EXAM:  VITALS: T(C): 36.9 (05-01-18 @ 19:45)  T(F): 98.5 (05-01-18 @ 19:45), Max: 98.5 (05-01-18 @ 19:45)  HR: 72 (05-01-18 @ 19:45) (70 - 72)  BP: 149/68 (05-01-18 @ 19:45) (142/65 - 149/68)  RR:  (16 - 17)  SpO2:  (95% - 96%)  Wt(kg): --  GENERAL: obese, NAD, well-groomed, well-developed  EYES: No proptosis, no lid lag, anicteric  HEENT:  Atraumatic, Normocephalic, moist mucous membranes  THYROID: Normal size, no palpable nodules  RESPIRATORY: Clear to auscultation bilaterally; No rales, rhonchi, wheezing, or rubs  CARDIOVASCULAR: Regular rate and rhythm; No murmurs; no peripheral edema  GI: Soft, nontender, non distended, normal bowel sounds  SKIN: Dry, intact, No rashes or lesions  NEURO: alert and oriented, answers questions appropriately, follows instruction  PSYCH: reactive affect, normal mood  CUSHING'S SIGNS: no striae    POCT Blood Glucose.: 167 mg/dL (05-02-18 @ 08:58)  Humalog 15+2  POCT Blood Glucose.: 155 mg/dL (05-01-18 @ 22:06)  Lantus 40  POCT Blood Glucose.: 171 mg/dL (05-01-18 @ 17:50)  Humalog 15+2  POCT Blood Glucose.: 118 mg/dL (05-01-18 @ 14:01)  POCT Blood Glucose.: 94 mg/dL (05-01-18 @ 13:28)  POCT Blood Glucose.: 66 mg/dL (05-01-18 @ 12:58)  POCT Blood Glucose.: 195 mg/dL (05-01-18 @ 08:37)  Humalog 17+2  POCT Blood Glucose.: 118 mg/dL (04-30-18 @ 21:40)  Lantus 40  POCT Blood Glucose.: 213 mg/dL (04-30-18 @ 17:57) Humalog 17+4  POCT Blood Glucose.: 83 mg/dL (04-30-18 @ 13:10)  POCT Blood Glucose.: 135 mg/dL (04-30-18 @ 08:44) Humalog 18  POCT Blood Glucose.: 98 mg/dL (04-29-18 @ 22:15)  Lantus 40  POCT Blood Glucose.: 130 mg/dL (04-29-18 @ 18:06)  Humalog 18  POCT Blood Glucose.: 72 mg/dL (04-29-18 @ 13:18)                          14.8   9.40  )-----------( 238      ( 02 May 2018 05:32 )             46.6       05-02    138  |  99  |  22  ----------------------------<  152<H>  4.5   |  27  |  0.86    EGFR if : 80  EGFR if non : 69    Ca    9.1      05-02  Mg     2.0     04-30  Thyroid Function Tests:  04-27 @ 08:45 TSH 3.06 FreeT4 -- T3 -- Anti TPO -- Anti Thyroglobulin Ab -- TSI --  Hemoglobin A1C, Whole Blood: 9.1 % <H> [4.0 - 5.6] (04-27-18 @ 08:45)  04-27 Chol 140 LDL 87 HDL 37<L> Trig 138

## 2018-05-02 NOTE — CONSULT NOTE ADULT - ASSESSMENT
Patient is a 68 yo woman with uncontrolled T2DM with neuropathy, HTN, HLD, mild dementia per EMR admitted for CVA

## 2018-05-03 LAB
BUN SERPL-MCNC: 22 MG/DL — SIGNIFICANT CHANGE UP (ref 7–23)
CALCIUM SERPL-MCNC: 8.8 MG/DL — SIGNIFICANT CHANGE UP (ref 8.4–10.5)
CHLORIDE SERPL-SCNC: 98 MMOL/L — SIGNIFICANT CHANGE UP (ref 98–107)
CO2 SERPL-SCNC: 27 MMOL/L — SIGNIFICANT CHANGE UP (ref 22–31)
CREAT SERPL-MCNC: 0.81 MG/DL — SIGNIFICANT CHANGE UP (ref 0.5–1.3)
GLUCOSE SERPL-MCNC: 181 MG/DL — HIGH (ref 70–99)
HCT VFR BLD CALC: 45.5 % — HIGH (ref 34.5–45)
HGB BLD-MCNC: 14.6 G/DL — SIGNIFICANT CHANGE UP (ref 11.5–15.5)
MCHC RBC-ENTMCNC: 28.5 PG — SIGNIFICANT CHANGE UP (ref 27–34)
MCHC RBC-ENTMCNC: 32.1 % — SIGNIFICANT CHANGE UP (ref 32–36)
MCV RBC AUTO: 88.7 FL — SIGNIFICANT CHANGE UP (ref 80–100)
NRBC # FLD: 0 — SIGNIFICANT CHANGE UP
PLATELET # BLD AUTO: 225 K/UL — SIGNIFICANT CHANGE UP (ref 150–400)
PMV BLD: 9.9 FL — SIGNIFICANT CHANGE UP (ref 7–13)
POTASSIUM SERPL-MCNC: 4.1 MMOL/L — SIGNIFICANT CHANGE UP (ref 3.5–5.3)
POTASSIUM SERPL-SCNC: 4.1 MMOL/L — SIGNIFICANT CHANGE UP (ref 3.5–5.3)
RBC # BLD: 5.13 M/UL — SIGNIFICANT CHANGE UP (ref 3.8–5.2)
RBC # FLD: 11.9 % — SIGNIFICANT CHANGE UP (ref 10.3–14.5)
SODIUM SERPL-SCNC: 136 MMOL/L — SIGNIFICANT CHANGE UP (ref 135–145)
WBC # BLD: 10.69 K/UL — HIGH (ref 3.8–10.5)
WBC # FLD AUTO: 10.69 K/UL — HIGH (ref 3.8–10.5)

## 2018-05-03 PROCEDURE — 99232 SBSQ HOSP IP/OBS MODERATE 35: CPT | Mod: GC

## 2018-05-03 PROCEDURE — 99233 SBSQ HOSP IP/OBS HIGH 50: CPT

## 2018-05-03 PROCEDURE — 71045 X-RAY EXAM CHEST 1 VIEW: CPT | Mod: 26

## 2018-05-03 RX ORDER — INSULIN GLARGINE 100 [IU]/ML
42 INJECTION, SOLUTION SUBCUTANEOUS AT BEDTIME
Qty: 0 | Refills: 0 | Status: DISCONTINUED | OUTPATIENT
Start: 2018-05-03 | End: 2018-05-05

## 2018-05-03 RX ORDER — INSULIN LISPRO 100/ML
13 VIAL (ML) SUBCUTANEOUS
Qty: 0 | Refills: 0 | Status: DISCONTINUED | OUTPATIENT
Start: 2018-05-03 | End: 2018-05-05

## 2018-05-03 RX ORDER — INSULIN LISPRO 100/ML
14 VIAL (ML) SUBCUTANEOUS
Qty: 0 | Refills: 0 | Status: DISCONTINUED | OUTPATIENT
Start: 2018-05-03 | End: 2018-05-03

## 2018-05-03 RX ADMIN — SERTRALINE 100 MILLIGRAM(S): 25 TABLET, FILM COATED ORAL at 12:52

## 2018-05-03 RX ADMIN — SODIUM CHLORIDE 75 MILLILITER(S): 9 INJECTION INTRAMUSCULAR; INTRAVENOUS; SUBCUTANEOUS at 12:55

## 2018-05-03 RX ADMIN — ENOXAPARIN SODIUM 30 MILLIGRAM(S): 100 INJECTION SUBCUTANEOUS at 12:52

## 2018-05-03 RX ADMIN — Medication 15 UNIT(S): at 09:06

## 2018-05-03 RX ADMIN — GABAPENTIN 100 MILLIGRAM(S): 400 CAPSULE ORAL at 12:52

## 2018-05-03 RX ADMIN — Medication 14 UNIT(S): at 13:13

## 2018-05-03 RX ADMIN — Medication 81 MILLIGRAM(S): at 12:52

## 2018-05-03 RX ADMIN — CLOPIDOGREL BISULFATE 75 MILLIGRAM(S): 75 TABLET, FILM COATED ORAL at 12:52

## 2018-05-03 RX ADMIN — INSULIN GLARGINE 42 UNIT(S): 100 INJECTION, SOLUTION SUBCUTANEOUS at 22:17

## 2018-05-03 RX ADMIN — LISINOPRIL 40 MILLIGRAM(S): 2.5 TABLET ORAL at 05:27

## 2018-05-03 RX ADMIN — Medication 2: at 09:06

## 2018-05-03 RX ADMIN — DONEPEZIL HYDROCHLORIDE 10 MILLIGRAM(S): 10 TABLET, FILM COATED ORAL at 21:19

## 2018-05-03 RX ADMIN — ATORVASTATIN CALCIUM 80 MILLIGRAM(S): 80 TABLET, FILM COATED ORAL at 21:18

## 2018-05-03 NOTE — PROGRESS NOTE ADULT - PROBLEM SELECTOR PLAN 3
Hgba1c 9.1 uncontrolled. Pt has hx of diabetes complicated by retinopathy  lower humalog with meals to 15  -monitor FS  ISS for coverage  Diabetic diet.  -+b/l LE tingling start gabapaentin 100 Hgba1c 9.1 uncontrolled. Pt has hx of diabetes complicated by retinopathy  lower humalog with meals to 14  -monitor FS  ISS for coverage  Diabetic diet.  -+b/l LE tingling start gabapaentin 100  -endocrine following

## 2018-05-03 NOTE — PROGRESS NOTE ADULT - ASSESSMENT
Patient is a 66 yo woman with uncontrolled T2DM with neuropathy, HTN, HLD, mild dementia per EMR admitted for CVA

## 2018-05-03 NOTE — PROGRESS NOTE ADULT - SUBJECTIVE AND OBJECTIVE BOX
Patient is a 67y old  Female who presents with a chief complaint of Left sided weakness/numbness (30 Apr 2018 08:25)      SUBJECTIVE / OVERNIGHT EVENTS:    MEDICATIONS  (STANDING):  aspirin enteric coated 81 milliGRAM(s) Oral daily  atorvastatin 80 milliGRAM(s) Oral at bedtime  clopidogrel Tablet 75 milliGRAM(s) Oral daily  dextrose 5%. 1000 milliLiter(s) (50 mL/Hr) IV Continuous <Continuous>  dextrose 50% Injectable 12.5 Gram(s) IV Push once  dextrose 50% Injectable 25 Gram(s) IV Push once  dextrose 50% Injectable 25 Gram(s) IV Push once  donepezil 10 milliGRAM(s) Oral at bedtime  enoxaparin Injectable 30 milliGRAM(s) SubCutaneous every 24 hours  gabapentin 100 milliGRAM(s) Oral daily  insulin glargine Injectable (LANTUS) 40 Unit(s) SubCutaneous at bedtime  insulin lispro (HumaLOG) corrective regimen sliding scale   SubCutaneous at bedtime  insulin lispro (HumaLOG) corrective regimen sliding scale   SubCutaneous three times a day before meals  insulin lispro Injectable (HumaLOG) 15 Unit(s) SubCutaneous three times a day before meals  lisinopril 40 milliGRAM(s) Oral daily  sertraline 100 milliGRAM(s) Oral daily  sodium chloride 0.9%. 1000 milliLiter(s) (75 mL/Hr) IV Continuous <Continuous>    MEDICATIONS  (PRN):  acetaminophen   Tablet. 650 milliGRAM(s) Oral every 6 hours PRN mild, moderate pain  dextrose Gel 1 Dose(s) Oral once PRN Blood Glucose LESS THAN 70 milliGRAM(s)/deciliter  glucagon  Injectable 1 milliGRAM(s) IntraMuscular once PRN Glucose LESS THAN 70 milligrams/deciliter        CAPILLARY BLOOD GLUCOSE      POCT Blood Glucose.: 201 mg/dL (03 May 2018 08:53)  POCT Blood Glucose.: 120 mg/dL (02 May 2018 21:56)  POCT Blood Glucose.: 71 mg/dL (02 May 2018 17:28)  POCT Blood Glucose.: 107 mg/dL (02 May 2018 12:16)    I&O's Summary      T(C): 36.7 (05-03-18 @ 05:00), Max: 36.8 (05-02-18 @ 14:10)  HR: 86 (05-03-18 @ 05:00) (65 - 86)  BP: 145/72 (05-03-18 @ 05:00) (145/72 - 189/76)  RR: 18 (05-03-18 @ 05:00) (16 - 18)  SpO2: 100% (05-03-18 @ 05:00) (95% - 100%)    PHYSICAL EXAM:  GENERAL: NAD, well-developed  HEAD:  Atraumatic, Normocephalic  EYES: EOMI, PERRLA, conjunctiva and sclera clear  NECK: Supple, No JVD  CHEST/LUNG: Clear to auscultation bilaterally; No wheeze  HEART: Regular rate and rhythm; No murmurs, rubs, or gallops  ABDOMEN: Soft, Nontender, Nondistended; Bowel sounds present  EXTREMITIES:  2+ Peripheral Pulses, No clubbing, cyanosis, or edema  PSYCH: AAOx3  NEUROLOGY: non-focal  SKIN: No rashes or lesions    LABS:                        14.6   10.69 )-----------( 225      ( 03 May 2018 05:41 )             45.5     05-03    136  |  98  |  22  ----------------------------<  181<H>  4.1   |  27  |  0.81    Ca    8.8      03 May 2018 05:41                  RADIOLOGY & ADDITIONAL TESTS:    Imaging Personally Reviewed:    Consultant(s) Notes Reviewed:      Care Discussed with Consultants/Other Providers: Patient is a 67y old  Female who presents with a chief complaint of Left sided weakness/numbness (30 Apr 2018 08:25)      SUBJECTIVE / OVERNIGHT EVENTS: Still dizzy when gets up to go to the bathroom started on IVF yesterday Fe NA less than 1. +cough +states pins and needle sensation is improved since gabapentin started. FS-71 @ 5/2 17:38     MEDICATIONS  (STANDING):  aspirin enteric coated 81 milliGRAM(s) Oral daily  atorvastatin 80 milliGRAM(s) Oral at bedtime  clopidogrel Tablet 75 milliGRAM(s) Oral daily  dextrose 5%. 1000 milliLiter(s) (50 mL/Hr) IV Continuous <Continuous>  dextrose 50% Injectable 12.5 Gram(s) IV Push once  dextrose 50% Injectable 25 Gram(s) IV Push once  dextrose 50% Injectable 25 Gram(s) IV Push once  donepezil 10 milliGRAM(s) Oral at bedtime  enoxaparin Injectable 30 milliGRAM(s) SubCutaneous every 24 hours  gabapentin 100 milliGRAM(s) Oral daily  insulin glargine Injectable (LANTUS) 40 Unit(s) SubCutaneous at bedtime  insulin lispro (HumaLOG) corrective regimen sliding scale   SubCutaneous at bedtime  insulin lispro (HumaLOG) corrective regimen sliding scale   SubCutaneous three times a day before meals  insulin lispro Injectable (HumaLOG) 15 Unit(s) SubCutaneous three times a day before meals  lisinopril 40 milliGRAM(s) Oral daily  sertraline 100 milliGRAM(s) Oral daily  sodium chloride 0.9%. 1000 milliLiter(s) (75 mL/Hr) IV Continuous <Continuous>    MEDICATIONS  (PRN):  acetaminophen   Tablet. 650 milliGRAM(s) Oral every 6 hours PRN mild, moderate pain  dextrose Gel 1 Dose(s) Oral once PRN Blood Glucose LESS THAN 70 milliGRAM(s)/deciliter  glucagon  Injectable 1 milliGRAM(s) IntraMuscular once PRN Glucose LESS THAN 70 milligrams/deciliter        CAPILLARY BLOOD GLUCOSE      POCT Blood Glucose.: 201 mg/dL (03 May 2018 08:53)  POCT Blood Glucose.: 120 mg/dL (02 May 2018 21:56)  POCT Blood Glucose.: 71 mg/dL (02 May 2018 17:28)  POCT Blood Glucose.: 107 mg/dL (02 May 2018 12:16)    I&O's Summary      T(C): 36.7 (05-03-18 @ 05:00), Max: 36.8 (05-02-18 @ 14:10)  HR: 86 (05-03-18 @ 05:00) (65 - 86)  BP: 145/72 (05-03-18 @ 05:00) (145/72 - 189/76)  RR: 18 (05-03-18 @ 05:00) (16 - 18)  SpO2: 100% (05-03-18 @ 05:00) (95% - 100%)    PHYSICAL EXAM:  GENERAL: NAD, well-developed  HEAD:  Atraumatic, Normocephalic  EYES: EOMI, PERRLA, conjunctiva and sclera clear  NECK: Supple, No JVD  CHEST/LUNG: Clear to auscultation bilaterally; No wheeze  HEART: Regular rate and rhythm; No murmurs, rubs, or gallops  ABDOMEN: Soft, Nontender, Nondistended; Bowel sounds present  EXTREMITIES:  2+ Peripheral Pulses, No clubbing, cyanosis, or edema  Neuro: mild LT hemiparesis; AOX3      LABS:                        14.6   10.69 )-----------( 225      ( 03 May 2018 05:41 )             45.5     05-03    136  |  98  |  22  ----------------------------<  181<H>  4.1   |  27  |  0.81    Ca    8.8      03 May 2018 05:41                  RADIOLOGY & ADDITIONAL TESTS:    Imaging Personally Reviewed:< from: Transthoracic Echocardiogram (05.02.18 @ 15:33) >  Patient name: JUAQUIN MYERS  YOB: 1950   Age: 67 (F)   MR#: [id number]  Study Date: 5/2/2018  Location: [address] [place] [address] StudySonographer: Vivi Prado RDCS  Study quality: Technically Difficult  Referring Physician: Heath Moss MD  Blood Pressure: 167/69 mmHg  Height: 144 cm  Weight: 85 kg  BSA: 1.8 m2  ------------------------------------------------------------------------  PROCEDURE: Transthoracic echocardiogram with 2-D, M-Mode  and complete spectral and color flow Doppler. Patient was  injected with 10 cc's of aerosolized saline.  Patient was injected with 10 cc's of aerosolized saline.  INDICATION: Cerebral infarction, unspecified (I63.9)  ------------------------------------------------------------------------  OBSERVATIONS:  Mitral Valve: Mitral annular calcification, otherwise  normal mitral valve. Minimal mitral regurgitation.  Aortic Root: Normal aortic root.  Aortic Valve: Aortic valve leaflet morphology not well  visualized.  Left Atrium: Normal left atrium.  Left Ventricle: Endocardium not well visualized; grossly  normal left ventricular systolic function.  Right Heart: Normal right atrium. The right ventricle is  not well visualized; grossly normal right ventricular  systolic function. Normal tricuspid valve.  Minimal  tricuspid regurgitation. Normal pulmonic valve.  Minimal  pulmonic regurgitation.  Pericardium/PleuraNormal pericardium with no pericardial  effusion.  ------------------------------------------------------------------------  CONCLUSIONS:  1. Mitral annular calcification, otherwise normal mitral  valve. Minimal mitral regurgitation.  2. Endocardium not well visualized; grossly normal left  ventricular systolic function.  3. The right ventricle is not well visualized; grossly  normal right ventricular systolic function.  4. A bubble study was performed with the intravenous  injection of agitated saline contrast and was inconclusive  regarding the presence of an interatrial shunt.  No obvious cardiac source of embolus was identified on this  transthoracic study.  If clinical suspicion is high,  consider AVE for further evaluation.    < end of copied text >      Consultant(s) Notes Reviewed:      Care Discussed with Consultants/Other Providers:

## 2018-05-03 NOTE — PROGRESS NOTE ADULT - SUBJECTIVE AND OBJECTIVE BOX
Chief Complaint: f/u DM2    History:  Patient states that she is not eating very much. Reports some nausea. Does not have abdominal pain or vomiting. Has some dizziness when walking to the bathroom.     MEDICATIONS  (STANDING):  aspirin enteric coated 81 milliGRAM(s) Oral daily  atorvastatin 80 milliGRAM(s) Oral at bedtime  clopidogrel Tablet 75 milliGRAM(s) Oral daily  dextrose 5%. 1000 milliLiter(s) (50 mL/Hr) IV Continuous <Continuous>  dextrose 50% Injectable 12.5 Gram(s) IV Push once  dextrose 50% Injectable 25 Gram(s) IV Push once  dextrose 50% Injectable 25 Gram(s) IV Push once  donepezil 10 milliGRAM(s) Oral at bedtime  enoxaparin Injectable 30 milliGRAM(s) SubCutaneous every 24 hours  gabapentin 100 milliGRAM(s) Oral daily  insulin glargine Injectable (LANTUS) 40 Unit(s) SubCutaneous at bedtime  insulin lispro (HumaLOG) corrective regimen sliding scale   SubCutaneous at bedtime  insulin lispro (HumaLOG) corrective regimen sliding scale   SubCutaneous three times a day before meals  insulin lispro Injectable (HumaLOG) 14 Unit(s) SubCutaneous three times a day before meals  lisinopril 40 milliGRAM(s) Oral daily  sertraline 100 milliGRAM(s) Oral daily  sodium chloride 0.9%. 1000 milliLiter(s) (75 mL/Hr) IV Continuous <Continuous>    MEDICATIONS  (PRN):  acetaminophen   Tablet. 650 milliGRAM(s) Oral every 6 hours PRN mild, moderate pain  dextrose Gel 1 Dose(s) Oral once PRN Blood Glucose LESS THAN 70 milliGRAM(s)/deciliter  glucagon  Injectable 1 milliGRAM(s) IntraMuscular once PRN Glucose LESS THAN 70 milligrams/deciliter      Allergies  No Known Allergies    Review of Systems:  Constitutional: No fever  Cardiovascular: No chest pain, palpitations  Respiratory: No SOB, no cough  GI: + nausea; no vomiting or abdominal pain    ALL OTHER SYSTEMS REVIEWED AND NEGATIVE    PHYSICAL EXAM:  VITALS: T(C): 36.7 (05-03-18 @ 05:00)  T(F): 98.1 (05-03-18 @ 05:00), Max: 98.2 (05-02-18 @ 14:10)  HR: 86 (05-03-18 @ 05:00) (70 - 86)  BP: 145/72 (05-03-18 @ 05:00) (145/72 - 189/76)  RR:  (16 - 18)  SpO2:  (95% - 100%)  Wt(kg): --  GENERAL: NAD, well-developed  RESPIRATORY: Clear to auscultation bilaterally; No rales, rhonchi, wheezing, or rubs  CARDIOVASCULAR: Regular rate and rhythm; No murmurs  GI: Soft, nontender, non distended    POCT Blood Glucose.: 93 mg/dL (05-03-18 @ 12:57) H 14  POCT Blood Glucose.: 201 mg/dL (05-03-18 @ 08:53) H 15, 2  POCT Blood Glucose.: 120 mg/dL (05-02-18 @ 21:56) L 40  POCT Blood Glucose.: 71 mg/dL (05-02-18 @ 17:28) no Humalog  POCT Blood Glucose.: 107 mg/dL (05-02-18 @ 12:16) H 15  POCT Blood Glucose.: 167 mg/dL (05-02-18 @ 08:58) H 15  POCT Blood Glucose.: 155 mg/dL (05-01-18 @ 22:06)  POCT Blood Glucose.: 171 mg/dL (05-01-18 @ 17:50)  POCT Blood Glucose.: 118 mg/dL (05-01-18 @ 14:01)  POCT Blood Glucose.: 94 mg/dL (05-01-18 @ 13:28)  POCT Blood Glucose.: 66 mg/dL (05-01-18 @ 12:58)  POCT Blood Glucose.: 195 mg/dL (05-01-18 @ 08:37)  POCT Blood Glucose.: 118 mg/dL (04-30-18 @ 21:40)  POCT Blood Glucose.: 213 mg/dL (04-30-18 @ 17:57)      05-03    136  |  98  |  22  ----------------------------<  181<H>  4.1   |  27  |  0.81    EGFR if : 86  EGFR if non : 74    Ca    8.8      05-03            Thyroid Function Tests:  04-27 @ 08:45 TSH 3.06 FreeT4 -- T3 -- Anti TPO -- Anti Thyroglobulin Ab -- TSI --      Hemoglobin A1C, Whole Blood: 9.1 % <H> [4.0 - 5.6] (04-27-18 @ 08:45)

## 2018-05-03 NOTE — PROGRESS NOTE ADULT - PROBLEM SELECTOR PLAN 2
+orthostatic does not appear dehydrated on exam likely related to autonomic dsyfunction from age and DM would keep BP approx 150 will restart lisinopril hold norvasc and lasix  -repeat orthostatics today + will check urine lytes and if pre renal will give IVF bolus 500 ml -repeat orthostatics + started on IVF coarse BS on exam with cough no overt fever/SOB on room air. TTE with normal EF will check CXR r/o PNA/fluid.  -repeat orthostatics

## 2018-05-03 NOTE — PROGRESS NOTE ADULT - PROBLEM SELECTOR PLAN 1
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  -ASA/plavix x 3 mo f/b ASA   -LDL-87 statin  -PT-rehab  -TTE w/ bubble-normal EF no obvious PFO noted

## 2018-05-03 NOTE — PROVIDER CONTACT NOTE (OTHER) - ACTION/TREATMENT ORDERED:
PA at bedside  orthostatics positive  fluids ordered  placed on BR  CT for slurred speech
standing Humalog at meal time held

## 2018-05-03 NOTE — PROVIDER CONTACT NOTE (OTHER) - SITUATION
after conferring with CHANTELLE Holt Tele PA   Dr. Gracia Called concerning meal standing amount of 13units Humalog..  Patient eating very small amounts with lower glucose numbers each meal

## 2018-05-03 NOTE — PROGRESS NOTE ADULT - PROBLEM SELECTOR PLAN 1
- recommend decrease Humalog to 13 units before meals  - increase Lantus to 42 units qhs  - c/w low correction scale  - consistent carb diet  - will follow

## 2018-05-03 NOTE — PROGRESS NOTE ADULT - SUBJECTIVE AND OBJECTIVE BOX
HPI:  68 yo Macedonian speaking woman PMH of insulin dependent DM Type II, HTN, mild Dementia, Depression, p/w slurred speechm left sided weakness and numbness x 2 days.  HPI was obtained through interpretation by daughter.  Pt states that left sided weakness and numbness started 2 days ago (Wednesday night). Pt states that she had associated drooling from the left side of her mouth and her tongue felt heavy.  Pt states that her "left leg wouldn't respond" and her left arm felt weak, so she was not able to walk.  She states that she believed it was due to her diabetes and went to sleep. The next morning her daughter called her and she states that the pt's speech was "incomprehensible," so the daughter called an ambulance.  Pt states that the last time she felt normal was Wednesday morning and that she has never had a stroke or these symptoms before. Pt states that left sided weakness has improved and was better yesterday than initial day.  Pt endorses intermittent lightheadedness when ambulating and blurry vision since Wednesday.  Pt denies CP, SOB, N/V/D, fever, chills, diaphoresis, tinnitus, edema, vertigo. She appears comfortable at this time. She is being admitted to telemetry to r/o cva.=> Of note: Pt was prescribed donepezil and sertraline, but ran out and has not refilled her prescription/has not been taking these medications for the past 2-3 months. (27 Apr 2018 11:34)    Interval:  MRI +RT paramedian louis infarct , On Asa/Plavix for 3 months, then Asa. Doing well, + pain in b/l feet, + burning, started on Gabapentin. Tolerating PT well. TTE w/ bubble-normal EF no obvious PFO noted.      REVIEW OF SYSTEMS: No chest pain, shortness of breath, nausea, vomiting or diarhea.          Vital Signs Last 24 Hrs  T(C): 36.9 (03 May 2018 14:01), Max: 36.9 (03 May 2018 14:01)  T(F): 98.4 (03 May 2018 14:01), Max: 98.4 (03 May 2018 14:01)  HR: 82 (03 May 2018 14:01) (75 - 86)  BP: 142/77 (03 May 2018 14:01) (142/77 - 159/78)  BP(mean): --  RR: 16 (03 May 2018 14:01) (16 - 18)  SpO2: 99% (03 May 2018 14:01) (98% - 100%)  ----------------------------------------------------------------------------------------  PHYSICAL EXAM  Constitutional - NAD, Comfortable  HEENT - NCAT, EOMI  Neck - Supple, No limited ROM  Chest - CTA bilaterally, No wheeze, No rhonchi, No crackles  Cardiovascular - RRR, S1S2, No murmurs  Abdomen - BS+, Soft, NTND  Extremities - No C/C/E, No calf tenderness   Neurologic Exam -                    Cognitive - Awake, Alert, AAO to self, place, date, year, situation     Communication - Fluent, No dysarthria, no aphasia     Cranial Nerves - CN 2-12 intact     Motor - LUE/LLE 4/5, RUE/RLE 5/5                       Sensory - Intact to LT     Reflexes - DTR Intact, No primitive reflexive     Balance - WNL Static  Psychiatric - Mood stable, Affect WNL

## 2018-05-04 LAB
BUN SERPL-MCNC: 17 MG/DL — SIGNIFICANT CHANGE UP (ref 7–23)
CALCIUM SERPL-MCNC: 8.8 MG/DL — SIGNIFICANT CHANGE UP (ref 8.4–10.5)
CHLORIDE SERPL-SCNC: 101 MMOL/L — SIGNIFICANT CHANGE UP (ref 98–107)
CO2 SERPL-SCNC: 27 MMOL/L — SIGNIFICANT CHANGE UP (ref 22–31)
CREAT SERPL-MCNC: 0.78 MG/DL — SIGNIFICANT CHANGE UP (ref 0.5–1.3)
GLUCOSE SERPL-MCNC: 169 MG/DL — HIGH (ref 70–99)
HCT VFR BLD CALC: 45.4 % — HIGH (ref 34.5–45)
HGB BLD-MCNC: 14.3 G/DL — SIGNIFICANT CHANGE UP (ref 11.5–15.5)
MCHC RBC-ENTMCNC: 28.7 PG — SIGNIFICANT CHANGE UP (ref 27–34)
MCHC RBC-ENTMCNC: 31.5 % — LOW (ref 32–36)
MCV RBC AUTO: 91.2 FL — SIGNIFICANT CHANGE UP (ref 80–100)
NRBC # FLD: 0 — SIGNIFICANT CHANGE UP
PLATELET # BLD AUTO: 195 K/UL — SIGNIFICANT CHANGE UP (ref 150–400)
PMV BLD: 10.4 FL — SIGNIFICANT CHANGE UP (ref 7–13)
POTASSIUM SERPL-MCNC: 3.9 MMOL/L — SIGNIFICANT CHANGE UP (ref 3.5–5.3)
POTASSIUM SERPL-SCNC: 3.9 MMOL/L — SIGNIFICANT CHANGE UP (ref 3.5–5.3)
RBC # BLD: 4.98 M/UL — SIGNIFICANT CHANGE UP (ref 3.8–5.2)
RBC # FLD: 11.9 % — SIGNIFICANT CHANGE UP (ref 10.3–14.5)
SODIUM SERPL-SCNC: 138 MMOL/L — SIGNIFICANT CHANGE UP (ref 135–145)
WBC # BLD: 8.71 K/UL — SIGNIFICANT CHANGE UP (ref 3.8–10.5)
WBC # FLD AUTO: 8.71 K/UL — SIGNIFICANT CHANGE UP (ref 3.8–10.5)

## 2018-05-04 PROCEDURE — 99233 SBSQ HOSP IP/OBS HIGH 50: CPT

## 2018-05-04 PROCEDURE — 99232 SBSQ HOSP IP/OBS MODERATE 35: CPT | Mod: GC

## 2018-05-04 PROCEDURE — 99232 SBSQ HOSP IP/OBS MODERATE 35: CPT

## 2018-05-04 RX ORDER — MECLIZINE HCL 12.5 MG
25 TABLET ORAL EVERY 6 HOURS
Qty: 0 | Refills: 0 | Status: DISCONTINUED | OUTPATIENT
Start: 2018-05-04 | End: 2018-05-08

## 2018-05-04 RX ORDER — SODIUM CHLORIDE 9 MG/ML
1000 INJECTION INTRAMUSCULAR; INTRAVENOUS; SUBCUTANEOUS
Qty: 0 | Refills: 0 | Status: DISCONTINUED | OUTPATIENT
Start: 2018-05-04 | End: 2018-05-04

## 2018-05-04 RX ORDER — SODIUM CHLORIDE 9 MG/ML
1000 INJECTION INTRAMUSCULAR; INTRAVENOUS; SUBCUTANEOUS
Qty: 0 | Refills: 0 | Status: DISCONTINUED | OUTPATIENT
Start: 2018-05-04 | End: 2018-05-05

## 2018-05-04 RX ADMIN — Medication 13 UNIT(S): at 09:49

## 2018-05-04 RX ADMIN — SODIUM CHLORIDE 75 MILLILITER(S): 9 INJECTION INTRAMUSCULAR; INTRAVENOUS; SUBCUTANEOUS at 00:00

## 2018-05-04 RX ADMIN — SODIUM CHLORIDE 75 MILLILITER(S): 9 INJECTION INTRAMUSCULAR; INTRAVENOUS; SUBCUTANEOUS at 09:50

## 2018-05-04 RX ADMIN — Medication 25 MILLIGRAM(S): at 19:22

## 2018-05-04 RX ADMIN — INSULIN GLARGINE 42 UNIT(S): 100 INJECTION, SOLUTION SUBCUTANEOUS at 22:14

## 2018-05-04 RX ADMIN — Medication 81 MILLIGRAM(S): at 12:00

## 2018-05-04 RX ADMIN — Medication 25 MILLIGRAM(S): at 22:14

## 2018-05-04 RX ADMIN — SERTRALINE 100 MILLIGRAM(S): 25 TABLET, FILM COATED ORAL at 12:01

## 2018-05-04 RX ADMIN — DONEPEZIL HYDROCHLORIDE 10 MILLIGRAM(S): 10 TABLET, FILM COATED ORAL at 22:14

## 2018-05-04 RX ADMIN — ATORVASTATIN CALCIUM 80 MILLIGRAM(S): 80 TABLET, FILM COATED ORAL at 22:14

## 2018-05-04 RX ADMIN — SODIUM CHLORIDE 75 MILLILITER(S): 9 INJECTION INTRAMUSCULAR; INTRAVENOUS; SUBCUTANEOUS at 14:56

## 2018-05-04 RX ADMIN — Medication 13 UNIT(S): at 19:22

## 2018-05-04 RX ADMIN — LISINOPRIL 40 MILLIGRAM(S): 2.5 TABLET ORAL at 05:04

## 2018-05-04 RX ADMIN — CLOPIDOGREL BISULFATE 75 MILLIGRAM(S): 75 TABLET, FILM COATED ORAL at 12:00

## 2018-05-04 RX ADMIN — Medication 25 MILLIGRAM(S): at 12:01

## 2018-05-04 RX ADMIN — Medication 13 UNIT(S): at 13:30

## 2018-05-04 RX ADMIN — ENOXAPARIN SODIUM 30 MILLIGRAM(S): 100 INJECTION SUBCUTANEOUS at 12:00

## 2018-05-04 RX ADMIN — GABAPENTIN 100 MILLIGRAM(S): 400 CAPSULE ORAL at 12:00

## 2018-05-04 RX ADMIN — Medication 1: at 09:50

## 2018-05-04 NOTE — DIETITIAN INITIAL EVALUATION ADULT. - OTHER INFO
Nutrition consult for HbA1c of 9.1 %. Pt is a 67F with a past medical Hx inclusive of DM, HTN, admitted for r/o CVA + acute vs. subacute r. paramedian pontine infarction.  Patient reports good PO intake at present and prior to admission. No GI distress (nausea/vomiting/diarrhea/constipation.) No difficulties chewing and swallowing foods and fluids.  Pt endorses good diet adherence prior to admission prior to admission, avoids sweets/soda/juice and exercises portion control with carbohydrates.  Pt was made aware of her current HbA1c and additional review of recommend diet modifications ( Consistent Carbohydrate/ low sodium) also reviewed at length with patient. Pt was made aware that RDN remains available, no further questions or concerns voiced at this time. Nutrition consult for HbA1c of 9.1 %. Pt is a 67F with a past medical Hx inclusive of DM, HTN, admitted for r/o CVA + acute vs. subacute r. paramedian pontine infarction.  Patient reports good PO intake at present and prior to admission. No GI distress (nausea/vomiting/diarrhea/constipation.) No difficulties chewing and swallowing foods and fluids, regular consistency diet with thin liquids recommended per speech and swallow assessment on 5/4. Pt endorses good diet adherence prior to admission prior to admission, avoids sweets/soda/juice and exercises portion control with carbohydrates.  Pt was made aware of her current HbA1c and additional review of recommend diet modifications ( Consistent Carbohydrate/ low sodium/ heart healthy) also reviewed at length with patient. Pt was made aware that RDN remains available, no further questions or concerns voiced at this time.

## 2018-05-04 NOTE — PROGRESS NOTE ADULT - ASSESSMENT
66 yo hyspanic f with PMhx of DM and HTN presented with 2 days of left sided weakness and left sided numbness. She is Welsh speaking, here with her daughter who provides translation. She states 2 days ago she had a sudden onset of left sided weakness, and drooling from the left side of the mouth, does not seek medical care until yesterday when daughter calls her and notices a severe slurred speech and they brought her to [facility]. She states her slurred speech is significantly improved now but she still has some residual left sided weakness as well as left sided of the body, and face numbness. She denies vertigo, double vision, visual disturbances. Exam showed left sided weakness 4/5, left UE pronator drift, with no gross sensory abnormality. 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     Plan:    aspirin and Plavix for 3 months   atorvastatin 80 mg daily;   aggressive cardiovascular risk factor management;  cont  PT   Keep SBP around 140-120

## 2018-05-04 NOTE — DIETITIAN INITIAL EVALUATION ADULT. - PROBLEM SELECTOR PLAN 3
Monitor blood pressure. Permissive HTN unless greater than or equal to 220/110  Plan to resume home meds and uptitrate as needed  Will resume ACEI last

## 2018-05-04 NOTE — PROGRESS NOTE ADULT - SUBJECTIVE AND OBJECTIVE BOX
Neurology follow up note     Subjective : Patient is intermittently feeling dizziness but improved from before.       HPI:  The patient is a 68 yo f with PMhx of DM and HTN presented with 2 days of left sided weakness and left sided numbness. She is Moh[place] speaking, here with her daughter who provides translation. She states 2 days ago she had a sudden onset of left sided weakness, and drooling from the left side of the mouth, does not seek medical care until yesterday when daughter calls her and notices a severe slurred speech and they brought her to [facility]. She states her slurred speech is significantly improved now but she still has some residual left sided weakness as well as left sided of the body, and face numbness. She denies vertigo, double vision, visual disturbances.    MEDICATIONS  (STANDING):    MEDICATIONS  (PRN):    PAST MEDICAL & SURGICAL HISTORY:  DM (diabetes mellitus)  HTN (hypertension)  No significant past surgical history    FAMILY HISTORY:    Allergies    No Known Allergies    Intolerances    SHx - No smoking, No ETOH, No drug abuse    Review of Systems:  CONSTITUTIONAL:  No weight loss, fever, chills, weakness or fatigue.  HEENT:  Eyes:  No visual loss, blurred vision, double vision or yellow sclerae. Ears, Nose, Throat:  No hearing loss, sneezing, congestion, runny nose or sore throat.  CARDIOVASCULAR:  No chest pain, chest pressure or chest discomfort. No palpitations or edema.  GASTROINTESTINAL:  No anorexia, nausea, vomiting or diarrhea. No abdominal pain or blood.  NEUROLOGICAL: See HPI  MUSCULOSKELETAL:  No muscle, back pain, joint pain or stiffness.  PSYCHIATRIC:  No history of depression or anxiety.      Vital Signs Last 24 Hrs    Vital Signs Last 24 Hrs  T(C): 36.8 (04 May 2018 13:49), Max: 36.8 (03 May 2018 20:00)  T(F): 98.2 (04 May 2018 13:49), Max: 98.2 (03 May 2018 20:00)  HR: 75 (04 May 2018 13:49) (65 - 85)  BP: 175/82 (04 May 2018 13:49) (141/75 - 190/84)  BP(mean): --  RR: 16 (04 May 2018 13:49) (16 - 18)  SpO2: 98% (04 May 2018 13:49) (94% - 100%)    General Exam:   General appearance: No acute distress                   Neurological Exam:  Mental Status: Orientated to self, date and place.    No dysarthria, aphasia or neglect.      Cranial Nerves:  PERRL, EOMI, VFF, no nystagmus or diplopia.  No APD.    Fundi not visualized bilaterally.    No facial asymmetry.  Hearing intact to finger rub bilaterally.     Motor:   Tone: normal.                  Strength:     [] Upper extremity                      Delt       Bicep    Tricep                                                  R         5/5        5/5        5/5       5/5                                               L          4+/5        5/5        4/5       4/5  [] Lower extremity                                                                      R       no drift                                               L         4/5   with some drift  Pronator drift: left side              Dysmetria: BL NL FTN  No truncal ataxia.    Tremor: No resting, postural or action tremor.  No myoclonus.    Sensation: intact to light touch, no extinction.    Other:    04-26                          14.3   8.71  )-----------( 195      ( 04 May 2018 05:31 )             45.4   05-04    138  |  101  |  17  ----------------------------<  169<H>  3.9   |  27  |  0.78    Ca    8.8      04 May 2018 05:31          Radiology  < from: CT Head No Cont (04.27.18 @ 01:52) >  IMPRESSION:    No acute intracranial hemorrhage, large cortical infarct or mass effect.   Additional findings as described. If there is continued clinical   suspicion for acute infarct, MRI may be obtained for further evaluation.    Nonspecific right mastoid opacification. Recommend clinical question to   assess acute mastoiditis.    < end of copied text >

## 2018-05-04 NOTE — PROGRESS NOTE ADULT - PROBLEM SELECTOR PLAN 1
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  -ASA/plavix x 3 mo f/b ASA   -LDL-87 statin  -PT-rehab  -TTE w/ bubble-normal EF no obvious PFO noted  -case d/w Neurology Dr. Akhtar believes pontine CVA and vertebral artery stenosis/hypoplasia contributing to symptoms. 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  -ASA/plavix x 3 mo f/b ASA   -LDL-87 statin  -PT-rehab  -TTE w/ bubble-normal EF no obvious PFO noted  -case d/w Neurology Dr. Akhtar believes pontine CVA and vertebral artery stenosis/hypoplasia contributing to symptoms of dizziness

## 2018-05-04 NOTE — PROGRESS NOTE ADULT - PROBLEM SELECTOR PLAN 3
Hgba1c 9.1 uncontrolled. Pt has hx of diabetes complicated by retinopathy  lower humalog with meals to 13/lantus 42 as per endocrine recs  -monitor FS  ISS for coverage  Diabetic diet.  -+b/l LE tingling start gabapaentin 100 improved

## 2018-05-04 NOTE — PROGRESS NOTE ADULT - SUBJECTIVE AND OBJECTIVE BOX
HPI:  68 yo Yoruba speaking woman PMH of insulin dependent DM Type II, HTN, mild Dementia, Depression, p/w slurred speechm left sided weakness and numbness x 2 days.  HPI was obtained through interpretation by daughter.  Pt states that left sided weakness and numbness started 2 days ago (Wednesday night). Pt states that she had associated drooling from the left side of her mouth and her tongue felt heavy.  Pt states that her "left leg wouldn't respond" and her left arm felt weak, so she was not able to walk.  She states that she believed it was due to her diabetes and went to sleep. The next morning her daughter called her and she states that the pt's speech was "incomprehensible," so the daughter called an ambulance.  Pt states that the last time she felt normal was Wednesday morning and that she has never had a stroke or these symptoms before. Pt states that left sided weakness has improved and was better yesterday than initial day.  Pt endorses intermittent lightheadedness when ambulating and blurry vision since Wednesday.  Pt denies CP, SOB, N/V/D, fever, chills, diaphoresis, tinnitus, edema, vertigo. She appears comfortable at this time. She is being admitted to telemetry to r/o cva.=> Of note: Pt was prescribed donepezil and sertraline, but ran out and has not refilled her prescription/has not been taking these medications for the past 2-3 months. (27 Apr 2018 11:34)    Interval:  MRI +RT paramedian louis infarct , On Asa/Plavix for 3 months, then Asa. Doing well, + pain in b/l feet imroved  Gabapentin. Tolerating PT well. TTE w/ bubble-normal EF no obvious PFO noted. swallow eval- regular diet.       REVIEW OF SYSTEMS: No chest pain, shortness of breath, nausea, vomiting or diarhea.          Vital Signs Last 24 Hrs  T(C): 36.8 (04 May 2018 13:49), Max: 36.8 (03 May 2018 20:00)  T(F): 98.2 (04 May 2018 13:49), Max: 98.2 (03 May 2018 20:00)  HR: 75 (04 May 2018 13:49) (65 - 85)  BP: 175/82 (04 May 2018 13:49) (141/75 - 190/84)  BP(mean): --  RR: 16 (04 May 2018 13:49) (16 - 18)  SpO2: 98% (04 May 2018 13:49) (94% - 100%)  ----------------------------------------------------------------------------------------  PHYSICAL EXAM  Constitutional - NAD, Comfortable  HEENT - NCAT, EOMI  Neck - Supple, No limited ROM  Chest - CTA bilaterally, No wheeze, No rhonchi, No crackles  Cardiovascular - RRR, S1S2, No murmurs  Abdomen - BS+, Soft, NTND  Extremities - No C/C/E, No calf tenderness   Neurologic Exam -                    Cognitive - Awake, Alert, AAO to self, place, date, year, situation     Communication - Fluent, No dysarthria, no aphasia     Cranial Nerves - CN 2-12 intact     Motor - LUE/LLE 4/5, RUE/RLE 5/5                       Sensory - Intact to LT     Reflexes - DTR Intact, No primitive reflexive     Balance - WNL Static  Psychiatric - Mood stable, Affect WNL

## 2018-05-04 NOTE — SWALLOW BEDSIDE ASSESSMENT ADULT - COMMENTS
+ Acute versus acute/subacute right paramedian pontine infarction barely   extending to the surface of the louis, + stenosis on MRA    5/3 Chest x-ray- clear lungs + Acute versus acute/subacute right paramedian pontine infarction barely   extending to the surface of the louis, + stenosis on MRA    Patient presented in bed, upright, alert and cooperative.   (pacific ) utilized #[id number] during this evaluation.  Patient reported no difficulty swallowing however at times she finds her throat feels tight when she is talking and her voice goes hoarse.  No hoarse vocal quality was noted during this swallowing evaluation.  The patient reported this feeling is not all the time and "occurs occasionally".  The patient reported that sometimes her words are not clear and she has been more forgetful in the past few months.  Although, there was no sudden change in memory since her hospitalization.  Patient was able to express all her thoughts effectively and clearly to the clinician through the  today and no receptive language deficits were noted.    5/3 Chest x-ray- clear lungs

## 2018-05-04 NOTE — DIETITIAN INITIAL EVALUATION ADULT. - PROBLEM SELECTOR PLAN 2
Hgba1c 9.1 uncontrolled. Pt has hx of diabetes complicated by retinopathy  Monitor blood glucose  Continue basal insulin - will reduce from h ome dose 50 units to 40 units reduce the risk of hypoglycemia. Plan to uptitrate to home regimen as pt eats more  Continue pre-meal  ISS for coverage  Diabetic diet

## 2018-05-04 NOTE — DIETITIAN INITIAL EVALUATION ADULT. - PERTINENT LABORATORY DATA
05-04 Na138 mmol/L Glu 169 mg/dL<H> K+ 3.9 mmol/L Cr  0.78 mg/dL BUN 17 mg/dL 04-27 [id number] 9.1 %<H> 04-27 Chol 140 mg/dL LDL 87 mg/dL HDL 37 mg/dL<L> Trig 138 mg/dL

## 2018-05-04 NOTE — PROGRESS NOTE ADULT - PROBLEM SELECTOR PLAN 2
-+orthostatic suspect some degree of orthostasis from autonomic insuff from age and poorly controlled DM urine lytes consistent with pre-renal azotemia s/p IVF with dizziness improved but still there case d/w Dr. Akhtar some symptoms can be attributable to CVA will start meclizine.  -TTE with normal EF given cough CXR was orderd and neg PNA/fluid overload.  -BP elevated this AM likely secondary to IVF will discontinue IVF encourage PO intake.

## 2018-05-04 NOTE — PROGRESS NOTE ADULT - SUBJECTIVE AND OBJECTIVE BOX
Patient is a 67y old  Female who presents with a chief complaint of Left sided weakness/numbness (30 Apr 2018 08:25)      SUBJECTIVE / OVERNIGHT EVENTS: no further episodes of hypoglycemia. some dizziness but improved. repeated orthostasis +    MEDICATIONS  (STANDING):  aspirin enteric coated 81 milliGRAM(s) Oral daily  atorvastatin 80 milliGRAM(s) Oral at bedtime  clopidogrel Tablet 75 milliGRAM(s) Oral daily  dextrose 5%. 1000 milliLiter(s) (50 mL/Hr) IV Continuous <Continuous>  dextrose 50% Injectable 12.5 Gram(s) IV Push once  dextrose 50% Injectable 25 Gram(s) IV Push once  dextrose 50% Injectable 25 Gram(s) IV Push once  donepezil 10 milliGRAM(s) Oral at bedtime  enoxaparin Injectable 30 milliGRAM(s) SubCutaneous every 24 hours  gabapentin 100 milliGRAM(s) Oral daily  insulin glargine Injectable (LANTUS) 42 Unit(s) SubCutaneous at bedtime  insulin lispro (HumaLOG) corrective regimen sliding scale   SubCutaneous three times a day before meals  insulin lispro (HumaLOG) corrective regimen sliding scale   SubCutaneous at bedtime  insulin lispro Injectable (HumaLOG) 13 Unit(s) SubCutaneous three times a day before meals  lisinopril 40 milliGRAM(s) Oral daily  meclizine 25 milliGRAM(s) Oral every 6 hours  sertraline 100 milliGRAM(s) Oral daily  sodium chloride 0.9%. 1000 milliLiter(s) (100 mL/Hr) IV Continuous <Continuous>    MEDICATIONS  (PRN):  acetaminophen   Tablet. 650 milliGRAM(s) Oral every 6 hours PRN mild, moderate pain  dextrose Gel 1 Dose(s) Oral once PRN Blood Glucose LESS THAN 70 milliGRAM(s)/deciliter  glucagon  Injectable 1 milliGRAM(s) IntraMuscular once PRN Glucose LESS THAN 70 milligrams/deciliter        CAPILLARY BLOOD GLUCOSE      POCT Blood Glucose.: 121 mg/dL (04 May 2018 12:38)  POCT Blood Glucose.: 159 mg/dL (04 May 2018 08:55)  POCT Blood Glucose.: 135 mg/dL (03 May 2018 21:26)  POCT Blood Glucose.: 110 mg/dL (03 May 2018 18:18)  POCT Blood Glucose.: 76 mg/dL (03 May 2018 17:27)    I&O's Summary      T(C): 36.8 (05-04-18 @ 13:49), Max: 36.9 (05-03-18 @ 14:01)  HR: 75 (05-04-18 @ 13:49) (65 - 85)  BP: 175/82 (05-04-18 @ 13:49) (141/75 - 190/84)  RR: 16 (05-04-18 @ 13:49) (16 - 18)  SpO2: 98% (05-04-18 @ 13:49) (94% - 100%)    PHYSICAL EXAM:  GENERAL: NAD, well-developed  HEAD:  Atraumatic, Normocephalic  EYES: EOMI, PERRLA, conjunctiva and sclera clear  NECK: Supple, No JVD  CHEST/LUNG: Clear to auscultation bilaterally; No wheeze  HEART: Regular rate and rhythm; No murmurs, rubs, or gallops  ABDOMEN: Soft, Nontender, Nondistended; Bowel sounds present  EXTREMITIES:  2+ Peripheral Pulses, No clubbing, cyanosis, or edema  Neuro: mild LT hemiparesis; AOX3  LABS:                        14.3   8.71  )-----------( 195      ( 04 May 2018 05:31 )             45.4     05-04    138  |  101  |  17  ----------------------------<  169<H>  3.9   |  27  |  0.78    Ca    8.8      04 May 2018 05:31                  RADIOLOGY & ADDITIONAL TESTS:    Imaging Personally Reviewed:< from: Xray Chest 1 View AP/PA (05.03.18 @ 15:50) >    INTERPRETATION:  TIME OF EXAM: May 3, 2018 at 3:53 PM.    CLINICAL INFORMATION: Hypertension. CVA. Cough.    COMPARISON:  None available    TECHNIQUE:   AP Portable chest x-ray.    INTERPRETATION:     The heart is not enlarged. The thoracic aorta is calcified.  The leta are unremarkable.  The lungs are clear.  No pleural effusion is seen.  There is osteoarthritic degenerative change of the spine.              IMPRESSION:  Clear lungs.      < end of copied text >      Consultant(s) Notes Reviewed:      Care Discussed with Consultants/Other Providers:

## 2018-05-04 NOTE — PROGRESS NOTE ADULT - PROBLEM SELECTOR PLAN 1
Target glucose 100-180  at goal  Would c/w insulin regimen as currently ordered:  Humalog to 13 units before meals  Lantus to 42 units qhs  low correction scale  consistent carb diet  - will follow Target glucose 100-180  at goal  Would c/w insulin regimen as currently ordered:  Humalog to 13 units before meals  Lantus to 42 units qhs  low correction scale  consistent carb diet  - will follow    See full consult for complete plan of care  Please have nursing staff validate patient is able to properly use insulin pen since CVA - she is on insulin at home but staff should validate that she is still able to do so (provider to RN order active) Target glucose 100-180  at goal  Would c/w insulin regimen as currently ordered:  Humalog to 13 units before meals  Lantus to 42 units qhs  low correction scale  consistent carb diet  - will follow    See full consult for complete plan of care  DM education has already met with patient/daughter. Please see documentation 4/30 by PRADEEP Costa

## 2018-05-04 NOTE — PROGRESS NOTE ADULT - SUBJECTIVE AND OBJECTIVE BOX
Chief Complaint: DM 2    History: BG trending at goal today. Patient is tolerating PO intake.     MEDICATIONS  (STANDING):  aspirin enteric coated 81 milliGRAM(s) Oral daily  atorvastatin 80 milliGRAM(s) Oral at bedtime  clopidogrel Tablet 75 milliGRAM(s) Oral daily  dextrose 5%. 1000 milliLiter(s) (50 mL/Hr) IV Continuous <Continuous>  dextrose 50% Injectable 12.5 Gram(s) IV Push once  dextrose 50% Injectable 25 Gram(s) IV Push once  dextrose 50% Injectable 25 Gram(s) IV Push once  donepezil 10 milliGRAM(s) Oral at bedtime  enoxaparin Injectable 30 milliGRAM(s) SubCutaneous every 24 hours  gabapentin 100 milliGRAM(s) Oral daily  insulin glargine Injectable (LANTUS) 42 Unit(s) SubCutaneous at bedtime  insulin lispro (HumaLOG) corrective regimen sliding scale   SubCutaneous three times a day before meals  insulin lispro (HumaLOG) corrective regimen sliding scale   SubCutaneous at bedtime  insulin lispro Injectable (HumaLOG) 13 Unit(s) SubCutaneous three times a day before meals  lisinopril 40 milliGRAM(s) Oral daily  meclizine 25 milliGRAM(s) Oral every 6 hours  sertraline 100 milliGRAM(s) Oral daily  sodium chloride 0.9%. 1000 milliLiter(s) (100 mL/Hr) IV Continuous <Continuous>    MEDICATIONS  (PRN):  acetaminophen   Tablet. 650 milliGRAM(s) Oral every 6 hours PRN mild, moderate pain  dextrose Gel 1 Dose(s) Oral once PRN Blood Glucose LESS THAN 70 milliGRAM(s)/deciliter  glucagon  Injectable 1 milliGRAM(s) IntraMuscular once PRN Glucose LESS THAN 70 milligrams/deciliter          No Known Allergies          Review of Systems:  Constitutional: No fever  Cardiovascular: No chest pain, palpitations  Respiratory: No SOB, no cough  GI: No nausea, vomiting, abdominal pain      PHYSICAL EXAM:  VITALS: T(C): 36.7 (05-04-18 @ 05:02)  T(F): 98.1 (05-04-18 @ 05:02), Max: 98.4 (05-03-18 @ 14:01)  HR: 71 (05-04-18 @ 11:56) (65 - 85)  BP: 190/84 (05-04-18 @ 11:56) (141/75 - 190/84)  RR:  (16 - 18)  SpO2:  (94% - 100%)  Wt(kg): --  GENERAL: NAD, well-developed  RESPIRATORY: non labored  GI: Soft, nontender, non distended  Skin: warm, dry     POCT Blood Glucose.: 121 mg/dL (05-04-18 @ 12:38)  POCT Blood Glucose.: 159 mg/dL (05-04-18 @ 08:55)  POCT Blood Glucose.: 135 mg/dL (05-03-18 @ 21:26)  POCT Blood Glucose.: 110 mg/dL (05-03-18 @ 18:18)  POCT Blood Glucose.: 76 mg/dL (05-03-18 @ 17:27)  POCT Blood Glucose.: 93 mg/dL (05-03-18 @ 12:57)  POCT Blood Glucose.: 201 mg/dL (05-03-18 @ 08:53)  POCT Blood Glucose.: 120 mg/dL (05-02-18 @ 21:56)  POCT Blood Glucose.: 71 mg/dL (05-02-18 @ 17:28)  POCT Blood Glucose.: 107 mg/dL (05-02-18 @ 12:16)  POCT Blood Glucose.: 167 mg/dL (05-02-18 @ 08:58)  POCT Blood Glucose.: 155 mg/dL (05-01-18 @ 22:06)  POCT Blood Glucose.: 171 mg/dL (05-01-18 @ 17:50)  POCT Blood Glucose.: 118 mg/dL (05-01-18 @ 14:01)  POCT Blood Glucose.: 94 mg/dL (05-01-18 @ 13:28)      05-04    138  |  101  |  17  ----------------------------<  169<H>  3.9   |  27  |  0.78    EGFR if : 90  EGFR if non : 78    Ca    8.8      05-04            Thyroid Function Tests:  04-27 @ 08:45 TSH 3.06 FreeT4 -- T3 -- Anti TPO -- Anti Thyroglobulin Ab -- TSI --      Hemoglobin A1C, Whole Blood: 9.1 % <H> [4.0 - 5.6] (04-27-18 @ 08:45)

## 2018-05-04 NOTE — SWALLOW BEDSIDE ASSESSMENT ADULT - ADDITIONAL RECOMMENDATIONS
1. MD to reconsult if changes in medical/mental status should occur   2. Patient provided with contact information for the [facility] for comprehensive speech and language evaluation if difficulty producing words continues upon discharge.    3. Patient would benefit from comprehensive speech and language evaluation upon discharge to appropriate setting (rehab vs home care vs outpatient services). Outpatient services at [facility] can be reached at [phone].

## 2018-05-04 NOTE — DIETITIAN INITIAL EVALUATION ADULT. - NS AS NUTRI INTERV ED CONTENT
Priority modifications/Survival information/Purpose of the nutrition education/Nutrition relationship to health/disease

## 2018-05-04 NOTE — SWALLOW BEDSIDE ASSESSMENT ADULT - SWALLOW EVAL: DIAGNOSIS
1.) Patient presents with adequate oral stage skills for solids and thin liquids marked by adequate bolus acceptance, formation, transfer and clearance.  2.) Pharyngeal stage of swallow for solids, and thin liquids marked by timely swallow trigger, adequate hyolaryngeal elevation and no overt s/s of penetration/aspiration.

## 2018-05-04 NOTE — DIETITIAN INITIAL EVALUATION ADULT. - PROBLEM SELECTOR PLAN 1
Admit to telemetry  Serial EKGs,   TTE w/ bubble study ordered and pending  F/u pending MRI   Continue aspirin and statin.   Permissive HTN  F/u pending MRI  F/u neurology recs

## 2018-05-04 NOTE — SWALLOW BEDSIDE ASSESSMENT ADULT - SLP PERTINENT HISTORY OF CURRENT PROBLEM
68 y/o female, with a PmHx of DM, HTN, presented with left sided weakness and numbness. Pt is being admitted to telemetry for r/o CVA

## 2018-05-05 LAB
BUN SERPL-MCNC: 13 MG/DL — SIGNIFICANT CHANGE UP (ref 7–23)
CALCIUM SERPL-MCNC: 8.9 MG/DL — SIGNIFICANT CHANGE UP (ref 8.4–10.5)
CHLORIDE SERPL-SCNC: 102 MMOL/L — SIGNIFICANT CHANGE UP (ref 98–107)
CO2 SERPL-SCNC: 27 MMOL/L — SIGNIFICANT CHANGE UP (ref 22–31)
CREAT SERPL-MCNC: 0.77 MG/DL — SIGNIFICANT CHANGE UP (ref 0.5–1.3)
GLUCOSE SERPL-MCNC: 57 MG/DL — LOW (ref 70–99)
HCT VFR BLD CALC: 44.3 % — SIGNIFICANT CHANGE UP (ref 34.5–45)
HGB BLD-MCNC: 14.4 G/DL — SIGNIFICANT CHANGE UP (ref 11.5–15.5)
MCHC RBC-ENTMCNC: 28.7 PG — SIGNIFICANT CHANGE UP (ref 27–34)
MCHC RBC-ENTMCNC: 32.5 % — SIGNIFICANT CHANGE UP (ref 32–36)
MCV RBC AUTO: 88.2 FL — SIGNIFICANT CHANGE UP (ref 80–100)
NRBC # FLD: 0 — SIGNIFICANT CHANGE UP
PLATELET # BLD AUTO: 205 K/UL — SIGNIFICANT CHANGE UP (ref 150–400)
PMV BLD: 9.9 FL — SIGNIFICANT CHANGE UP (ref 7–13)
POTASSIUM SERPL-MCNC: 3.6 MMOL/L — SIGNIFICANT CHANGE UP (ref 3.5–5.3)
POTASSIUM SERPL-SCNC: 3.6 MMOL/L — SIGNIFICANT CHANGE UP (ref 3.5–5.3)
RBC # BLD: 5.02 M/UL — SIGNIFICANT CHANGE UP (ref 3.8–5.2)
RBC # FLD: 12 % — SIGNIFICANT CHANGE UP (ref 10.3–14.5)
SODIUM SERPL-SCNC: 140 MMOL/L — SIGNIFICANT CHANGE UP (ref 135–145)
WBC # BLD: 8.51 K/UL — SIGNIFICANT CHANGE UP (ref 3.8–10.5)
WBC # FLD AUTO: 8.51 K/UL — SIGNIFICANT CHANGE UP (ref 3.8–10.5)

## 2018-05-05 PROCEDURE — 99233 SBSQ HOSP IP/OBS HIGH 50: CPT

## 2018-05-05 PROCEDURE — 99232 SBSQ HOSP IP/OBS MODERATE 35: CPT

## 2018-05-05 RX ORDER — INSULIN LISPRO 100/ML
6 VIAL (ML) SUBCUTANEOUS
Qty: 0 | Refills: 0 | Status: DISCONTINUED | OUTPATIENT
Start: 2018-05-05 | End: 2018-05-08

## 2018-05-05 RX ORDER — INSULIN GLARGINE 100 [IU]/ML
32 INJECTION, SOLUTION SUBCUTANEOUS AT BEDTIME
Qty: 0 | Refills: 0 | Status: DISCONTINUED | OUTPATIENT
Start: 2018-05-05 | End: 2018-05-08

## 2018-05-05 RX ORDER — AMLODIPINE BESYLATE 2.5 MG/1
5 TABLET ORAL DAILY
Qty: 0 | Refills: 0 | Status: DISCONTINUED | OUTPATIENT
Start: 2018-05-05 | End: 2018-05-08

## 2018-05-05 RX ORDER — INSULIN LISPRO 100/ML
6 VIAL (ML) SUBCUTANEOUS
Qty: 0 | Refills: 0 | Status: DISCONTINUED | OUTPATIENT
Start: 2018-05-05 | End: 2018-05-05

## 2018-05-05 RX ORDER — AMLODIPINE BESYLATE 2.5 MG/1
5 TABLET ORAL DAILY
Qty: 0 | Refills: 0 | Status: DISCONTINUED | OUTPATIENT
Start: 2018-05-05 | End: 2018-05-05

## 2018-05-05 RX ORDER — INSULIN LISPRO 100/ML
VIAL (ML) SUBCUTANEOUS AT BEDTIME
Qty: 0 | Refills: 0 | Status: DISCONTINUED | OUTPATIENT
Start: 2018-05-05 | End: 2018-05-08

## 2018-05-05 RX ORDER — INSULIN LISPRO 100/ML
10 VIAL (ML) SUBCUTANEOUS
Qty: 0 | Refills: 0 | Status: DISCONTINUED | OUTPATIENT
Start: 2018-05-05 | End: 2018-05-05

## 2018-05-05 RX ORDER — INSULIN GLARGINE 100 [IU]/ML
38 INJECTION, SOLUTION SUBCUTANEOUS AT BEDTIME
Qty: 0 | Refills: 0 | Status: DISCONTINUED | OUTPATIENT
Start: 2018-05-05 | End: 2018-05-05

## 2018-05-05 RX ORDER — HYDRALAZINE HCL 50 MG
5 TABLET ORAL ONCE
Qty: 0 | Refills: 0 | Status: COMPLETED | OUTPATIENT
Start: 2018-05-05 | End: 2018-05-05

## 2018-05-05 RX ADMIN — Medication 25 MILLIGRAM(S): at 11:15

## 2018-05-05 RX ADMIN — DONEPEZIL HYDROCHLORIDE 10 MILLIGRAM(S): 10 TABLET, FILM COATED ORAL at 23:09

## 2018-05-05 RX ADMIN — SERTRALINE 100 MILLIGRAM(S): 25 TABLET, FILM COATED ORAL at 11:15

## 2018-05-05 RX ADMIN — LISINOPRIL 40 MILLIGRAM(S): 2.5 TABLET ORAL at 05:22

## 2018-05-05 RX ADMIN — Medication 81 MILLIGRAM(S): at 11:15

## 2018-05-05 RX ADMIN — Medication 13 UNIT(S): at 09:04

## 2018-05-05 RX ADMIN — Medication 25 MILLIGRAM(S): at 23:09

## 2018-05-05 RX ADMIN — Medication 25 MILLIGRAM(S): at 05:22

## 2018-05-05 RX ADMIN — SODIUM CHLORIDE 75 MILLILITER(S): 9 INJECTION INTRAMUSCULAR; INTRAVENOUS; SUBCUTANEOUS at 05:40

## 2018-05-05 RX ADMIN — Medication 6 UNIT(S): at 18:01

## 2018-05-05 RX ADMIN — ATORVASTATIN CALCIUM 80 MILLIGRAM(S): 80 TABLET, FILM COATED ORAL at 23:08

## 2018-05-05 RX ADMIN — CLOPIDOGREL BISULFATE 75 MILLIGRAM(S): 75 TABLET, FILM COATED ORAL at 11:15

## 2018-05-05 RX ADMIN — GABAPENTIN 100 MILLIGRAM(S): 400 CAPSULE ORAL at 11:15

## 2018-05-05 RX ADMIN — INSULIN GLARGINE 32 UNIT(S): 100 INJECTION, SOLUTION SUBCUTANEOUS at 23:04

## 2018-05-05 RX ADMIN — ENOXAPARIN SODIUM 30 MILLIGRAM(S): 100 INJECTION SUBCUTANEOUS at 11:16

## 2018-05-05 RX ADMIN — AMLODIPINE BESYLATE 5 MILLIGRAM(S): 2.5 TABLET ORAL at 11:15

## 2018-05-05 RX ADMIN — Medication 25 MILLIGRAM(S): at 18:01

## 2018-05-05 RX ADMIN — Medication 5 MILLIGRAM(S): at 14:37

## 2018-05-05 NOTE — PROGRESS NOTE ADULT - PROBLEM SELECTOR PLAN 3
-Hypoglycemic this AM.  -Endo input appreciated. Decrease premeal and basal insulin.  -Cont to monitor.

## 2018-05-05 NOTE — PROGRESS NOTE ADULT - PROBLEM SELECTOR PLAN 1
Target glucose 100-180  below goal with hypoglycemia  Decreased Humalog to 10 units before meals  Decreased Lantus to 38 units qhs  C/W low correction scale  C/W consistent carb diet  - will follow    See full consult for complete plan of care  DM education has already met with patient/daughter. Please see documentation 4/30 by PRADEEP Costa Target glucose 100-180  below goal with hypoglycemia  Decreased Humalog to 10 units before meals  Decreased Lantus to 38 units qhs  C/W low correction scale  C/W consistent carb diet  - will follow    See full consult for complete plan of care  DM education has already met with patient/daughter. Please see documentation 4/30 by PRADEEP Costa    ADDENDUM 1224: lunch FS In 80s, decreased premeal Humalog further to 6 units TID before meals. Also cut back Lantus further to 32 units for tonight. Adjustments may need to be made tomorrow pending glucose trend. Can reach endocrine at .

## 2018-05-05 NOTE — PROGRESS NOTE ADULT - PROBLEM SELECTOR PLAN 1
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  -Neuro input appreciated.  -ASA/plavix x 3 mo f/b ASA   -LDL-87 statin  -PT-rehab  -TTE w/ bubble-normal EF no obvious PFO noted  -case d/w Neurology Dr. Akhtar believes pontine CVA and vertebral artery stenosis/hypoplasia contributing to symptoms of dizziness. c/w meclizine

## 2018-05-05 NOTE — PROGRESS NOTE ADULT - PROBLEM SELECTOR PLAN 2
-Remains significantly hypertensive despite cessation of IVF. However, pt also has significant orthostatic hypotension.  -c/w lisinopril. Will add back norvasc 5mg now in an effort to better control SBP. Hold of on beta blockers due to associated heart rate reduction and likely worsening of orthostasis.

## 2018-05-05 NOTE — PROGRESS NOTE ADULT - SUBJECTIVE AND OBJECTIVE BOX
Chief Complaint: DM 2    History: Hypoglycemia both later in day yesterday and this AM. Patient is eating well, all of her meals. Staff appropriately treated hypoglycemia with 1 cup of juice and then gave 7 units of pre-meal Humalog instead of standing 13 units s/p resolution of hypoglycemia.     MEDICATIONS  (STANDING):  amLODIPine   Tablet 5 milliGRAM(s) Oral daily  aspirin enteric coated 81 milliGRAM(s) Oral daily  atorvastatin 80 milliGRAM(s) Oral at bedtime  clopidogrel Tablet 75 milliGRAM(s) Oral daily  dextrose 5%. 1000 milliLiter(s) (50 mL/Hr) IV Continuous <Continuous>  dextrose 50% Injectable 12.5 Gram(s) IV Push once  dextrose 50% Injectable 25 Gram(s) IV Push once  dextrose 50% Injectable 25 Gram(s) IV Push once  donepezil 10 milliGRAM(s) Oral at bedtime  enoxaparin Injectable 30 milliGRAM(s) SubCutaneous every 24 hours  gabapentin 100 milliGRAM(s) Oral daily  insulin glargine Injectable (LANTUS) 42 Unit(s) SubCutaneous at bedtime  insulin lispro (HumaLOG) corrective regimen sliding scale   SubCutaneous at bedtime  insulin lispro (HumaLOG) corrective regimen sliding scale   SubCutaneous three times a day before meals  insulin lispro Injectable (HumaLOG) 13 Unit(s) SubCutaneous three times a day before meals  lisinopril 40 milliGRAM(s) Oral daily  meclizine 25 milliGRAM(s) Oral every 6 hours  sertraline 100 milliGRAM(s) Oral daily    MEDICATIONS  (PRN):  acetaminophen   Tablet. 650 milliGRAM(s) Oral every 6 hours PRN mild, moderate pain  dextrose Gel 1 Dose(s) Oral once PRN Blood Glucose LESS THAN 70 milliGRAM(s)/deciliter  glucagon  Injectable 1 milliGRAM(s) IntraMuscular once PRN Glucose LESS THAN 70 milligrams/deciliter          No Known Allergies          Review of Systems:  Constitutional: No fever  Cardiovascular: No chest pain, palpitations  Respiratory: No SOB, no cough  GI: No nausea, vomiting, abdominal pain    PHYSICAL EXAM:  VITALS: T(C): 36.8 (05-05-18 @ 05:20)  T(F): 98.3 (05-05-18 @ 05:20), Max: 98.3 (05-05-18 @ 05:20)  HR: 82 (05-05-18 @ 08:35) (65 - 82)  BP: 196/78 (05-05-18 @ 08:35) (170/82 - 196/78)  RR:  (16 - 18)  SpO2:  (97% - 99%)  Wt(kg): --  GENERAL: NAD, well-developed  RESPIRATORY: non labored  GI: Soft, nontender, non distended  Skin: warm, dry     POCT Blood Glucose.: 148 mg/dL (05-05-18 @ 08:57)  POCT Blood Glucose.: 58 mg/dL (05-05-18 @ 08:24)  POCT Blood Glucose.: 83 mg/dL (05-04-18 @ 22:08)  POCT Blood Glucose.: 60 mg/dL (05-04-18 @ 21:45)  POCT Blood Glucose.: 66 mg/dL (05-04-18 @ 21:16)  POCT Blood Glucose.: 78 mg/dL (05-04-18 @ 17:37)  POCT Blood Glucose.: 121 mg/dL (05-04-18 @ 12:38)  POCT Blood Glucose.: 159 mg/dL (05-04-18 @ 08:55)  POCT Blood Glucose.: 135 mg/dL (05-03-18 @ 21:26)  POCT Blood Glucose.: 110 mg/dL (05-03-18 @ 18:18)  POCT Blood Glucose.: 76 mg/dL (05-03-18 @ 17:27)  POCT Blood Glucose.: 93 mg/dL (05-03-18 @ 12:57)  POCT Blood Glucose.: 201 mg/dL (05-03-18 @ 08:53)  POCT Blood Glucose.: 120 mg/dL (05-02-18 @ 21:56)  POCT Blood Glucose.: 71 mg/dL (05-02-18 @ 17:28)  POCT Blood Glucose.: 107 mg/dL (05-02-18 @ 12:16)      05-05    140  |  102  |  13  ----------------------------<  57<L>  3.6   |  27  |  0.77    EGFR if : 91  EGFR if non : 79    Ca    8.9      05-05            Thyroid Function Tests:  04-27 @ 08:45 TSH 3.06 FreeT4 -- T3 -- Anti TPO -- Anti Thyroglobulin Ab -- TSI --      Hemoglobin A1C, Whole Blood: 9.1 % <H> [4.0 - 5.6] (04-27-18 @ 08:45)

## 2018-05-05 NOTE — PROGRESS NOTE ADULT - SUBJECTIVE AND OBJECTIVE BOX
Patient is a 67y old  Female who presents with a chief complaint of Left sided weakness/numbness (30 Apr 2018 08:25)      SUBJECTIVE / OVERNIGHT EVENTS: No acute events overnight. Hypoglycemic this morning. Hypertensive this AM. Afebrile. Pt has no complaints. All other ROS negative.    MEDICATIONS  (STANDING):  amLODIPine   Tablet 5 milliGRAM(s) Oral daily  aspirin enteric coated 81 milliGRAM(s) Oral daily  atorvastatin 80 milliGRAM(s) Oral at bedtime  clopidogrel Tablet 75 milliGRAM(s) Oral daily  dextrose 5%. 1000 milliLiter(s) (50 mL/Hr) IV Continuous <Continuous>  dextrose 50% Injectable 12.5 Gram(s) IV Push once  dextrose 50% Injectable 25 Gram(s) IV Push once  dextrose 50% Injectable 25 Gram(s) IV Push once  donepezil 10 milliGRAM(s) Oral at bedtime  enoxaparin Injectable 30 milliGRAM(s) SubCutaneous every 24 hours  gabapentin 100 milliGRAM(s) Oral daily  insulin glargine Injectable (LANTUS) 38 Unit(s) SubCutaneous at bedtime  insulin lispro (HumaLOG) corrective regimen sliding scale   SubCutaneous at bedtime  insulin lispro (HumaLOG) corrective regimen sliding scale   SubCutaneous three times a day before meals  insulin lispro Injectable (HumaLOG) 10 Unit(s) SubCutaneous three times a day before meals  lisinopril 40 milliGRAM(s) Oral daily  meclizine 25 milliGRAM(s) Oral every 6 hours  sertraline 100 milliGRAM(s) Oral daily    MEDICATIONS  (PRN):  acetaminophen   Tablet. 650 milliGRAM(s) Oral every 6 hours PRN mild, moderate pain  dextrose Gel 1 Dose(s) Oral once PRN Blood Glucose LESS THAN 70 milliGRAM(s)/deciliter  glucagon  Injectable 1 milliGRAM(s) IntraMuscular once PRN Glucose LESS THAN 70 milligrams/deciliter          PHYSICAL EXAM:  Vital Signs Last 24 Hrs  T(C): 36.8 (05 May 2018 05:20), Max: 36.8 (04 May 2018 13:49)  T(F): 98.3 (05 May 2018 05:20), Max: 98.3 (05 May 2018 05:20)  HR: 82 (05 May 2018 08:35) (65 - 82)  BP: 196/78 (05 May 2018 08:35) (170/82 - 196/78)  BP(mean): --  RR: 18 (05 May 2018 05:20) (16 - 18)  SpO2: 99% (05 May 2018 05:20) (97% - 99%)  GENERAL: Alert, awake, NAD, well-developed  HEAD:  Atraumatic, Normocephalic  EYES: EOMI, PERRLA, conjunctiva and sclera clear  NECK: Supple, No JVD  CHEST/LUNG: Clear to auscultation bilaterally; No wheeze  HEART: Regular rate and rhythm; No murmurs, rubs, or gallops  ABDOMEN: Soft, Nontender, Nondistended; Bowel sounds present  EXTREMITIES:  2+ Peripheral Pulses, No clubbing, cyanosis, or edema  NEUROLOGY: Mild left-sided weakness  SKIN: No Rashes or lesions      LABS:                        14.4   8.51  )-----------( 205      ( 05 May 2018 06:18 )             44.3     05-05    140  |  102  |  13  ----------------------------<  57<L>  3.6   |  27  |  0.77    Ca    8.9      05 May 2018 06:18                RADIOLOGY & ADDITIONAL TESTS:    Imaging Personally Reviewed:    Consultant(s) Notes Reviewed:  PMR, endocrinology, neurology    Care Discussed with Consultants/Other Providers:

## 2018-05-06 LAB
BUN SERPL-MCNC: 16 MG/DL — SIGNIFICANT CHANGE UP (ref 7–23)
CALCIUM SERPL-MCNC: 9 MG/DL — SIGNIFICANT CHANGE UP (ref 8.4–10.5)
CHLORIDE SERPL-SCNC: 99 MMOL/L — SIGNIFICANT CHANGE UP (ref 98–107)
CO2 SERPL-SCNC: 27 MMOL/L — SIGNIFICANT CHANGE UP (ref 22–31)
CREAT SERPL-MCNC: 0.75 MG/DL — SIGNIFICANT CHANGE UP (ref 0.5–1.3)
GLUCOSE SERPL-MCNC: 127 MG/DL — HIGH (ref 70–99)
HCT VFR BLD CALC: 45 % — SIGNIFICANT CHANGE UP (ref 34.5–45)
HGB BLD-MCNC: 14.8 G/DL — SIGNIFICANT CHANGE UP (ref 11.5–15.5)
MCHC RBC-ENTMCNC: 28.6 PG — SIGNIFICANT CHANGE UP (ref 27–34)
MCHC RBC-ENTMCNC: 32.9 % — SIGNIFICANT CHANGE UP (ref 32–36)
MCV RBC AUTO: 86.9 FL — SIGNIFICANT CHANGE UP (ref 80–100)
NRBC # FLD: 0 — SIGNIFICANT CHANGE UP
PLATELET # BLD AUTO: 207 K/UL — SIGNIFICANT CHANGE UP (ref 150–400)
PMV BLD: 10 FL — SIGNIFICANT CHANGE UP (ref 7–13)
POTASSIUM SERPL-MCNC: 3.7 MMOL/L — SIGNIFICANT CHANGE UP (ref 3.5–5.3)
POTASSIUM SERPL-SCNC: 3.7 MMOL/L — SIGNIFICANT CHANGE UP (ref 3.5–5.3)
RBC # BLD: 5.18 M/UL — SIGNIFICANT CHANGE UP (ref 3.8–5.2)
RBC # FLD: 12 % — SIGNIFICANT CHANGE UP (ref 10.3–14.5)
SODIUM SERPL-SCNC: 138 MMOL/L — SIGNIFICANT CHANGE UP (ref 135–145)
WBC # BLD: 9.04 K/UL — SIGNIFICANT CHANGE UP (ref 3.8–10.5)
WBC # FLD AUTO: 9.04 K/UL — SIGNIFICANT CHANGE UP (ref 3.8–10.5)

## 2018-05-06 PROCEDURE — 99233 SBSQ HOSP IP/OBS HIGH 50: CPT

## 2018-05-06 RX ADMIN — Medication 25 MILLIGRAM(S): at 11:14

## 2018-05-06 RX ADMIN — Medication 6 UNIT(S): at 13:15

## 2018-05-06 RX ADMIN — LISINOPRIL 40 MILLIGRAM(S): 2.5 TABLET ORAL at 05:28

## 2018-05-06 RX ADMIN — INSULIN GLARGINE 32 UNIT(S): 100 INJECTION, SOLUTION SUBCUTANEOUS at 22:00

## 2018-05-06 RX ADMIN — DONEPEZIL HYDROCHLORIDE 10 MILLIGRAM(S): 10 TABLET, FILM COATED ORAL at 22:00

## 2018-05-06 RX ADMIN — Medication 25 MILLIGRAM(S): at 05:29

## 2018-05-06 RX ADMIN — Medication 25 MILLIGRAM(S): at 23:01

## 2018-05-06 RX ADMIN — Medication 6 UNIT(S): at 09:14

## 2018-05-06 RX ADMIN — CLOPIDOGREL BISULFATE 75 MILLIGRAM(S): 75 TABLET, FILM COATED ORAL at 11:14

## 2018-05-06 RX ADMIN — Medication 81 MILLIGRAM(S): at 11:14

## 2018-05-06 RX ADMIN — ATORVASTATIN CALCIUM 80 MILLIGRAM(S): 80 TABLET, FILM COATED ORAL at 22:00

## 2018-05-06 RX ADMIN — Medication 6 UNIT(S): at 17:58

## 2018-05-06 RX ADMIN — SERTRALINE 100 MILLIGRAM(S): 25 TABLET, FILM COATED ORAL at 11:14

## 2018-05-06 RX ADMIN — Medication 25 MILLIGRAM(S): at 17:58

## 2018-05-06 RX ADMIN — GABAPENTIN 100 MILLIGRAM(S): 400 CAPSULE ORAL at 11:14

## 2018-05-06 RX ADMIN — AMLODIPINE BESYLATE 5 MILLIGRAM(S): 2.5 TABLET ORAL at 05:28

## 2018-05-06 RX ADMIN — ENOXAPARIN SODIUM 30 MILLIGRAM(S): 100 INJECTION SUBCUTANEOUS at 11:14

## 2018-05-06 RX ADMIN — Medication 1: at 17:58

## 2018-05-06 NOTE — PROGRESS NOTE ADULT - PROBLEM SELECTOR PLAN 2
-Orthostatics remain positive, likely autonomic dysfunction with uncontrolled DM.   -c/w lisinopril, c/w norvasc 5mg now in an effort to better control SBP. Hold of on beta blockers due to associated heart rate reduction and likely worsening of orthostasis.  -Hydralazine PRN

## 2018-05-06 NOTE — PROGRESS NOTE ADULT - PROBLEM SELECTOR PLAN 1
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  -Neuro input appreciated.  -ASA/plavix x 3 mo f/b ASA   -LDL-87 statin  -PT-rehab, hopefully in next 24-48 hrs  -TTE w/ bubble-normal EF no obvious PFO noted  -Neurology believes pontine CVA and vertebral artery stenosis/hypoplasia contributing to symptoms of dizziness. c/w meclizine

## 2018-05-06 NOTE — PROGRESS NOTE ADULT - SUBJECTIVE AND OBJECTIVE BOX
Patient is a 67y old  Female who presents with a chief complaint of Left sided weakness/numbness (30 Apr 2018 08:25)      SUBJECTIVE / OVERNIGHT EVENTS: No acute events overnight. No events on tele. Hydralazine given yesterday afternoon for elevated SBP. Afebrile, no pain. No HA, vision changes, CP, SOB. All other ROS negative.    MEDICATIONS  (STANDING):  amLODIPine   Tablet 5 milliGRAM(s) Oral daily  aspirin enteric coated 81 milliGRAM(s) Oral daily  atorvastatin 80 milliGRAM(s) Oral at bedtime  clopidogrel Tablet 75 milliGRAM(s) Oral daily  dextrose 5%. 1000 milliLiter(s) (50 mL/Hr) IV Continuous <Continuous>  dextrose 50% Injectable 12.5 Gram(s) IV Push once  dextrose 50% Injectable 25 Gram(s) IV Push once  dextrose 50% Injectable 25 Gram(s) IV Push once  donepezil 10 milliGRAM(s) Oral at bedtime  enoxaparin Injectable 30 milliGRAM(s) SubCutaneous every 24 hours  gabapentin 100 milliGRAM(s) Oral daily  insulin glargine Injectable (LANTUS) 32 Unit(s) SubCutaneous at bedtime  insulin lispro (HumaLOG) corrective regimen sliding scale   SubCutaneous at bedtime  insulin lispro (HumaLOG) corrective regimen sliding scale   SubCutaneous three times a day before meals  insulin lispro Injectable (HumaLOG) 6 Unit(s) SubCutaneous three times a day before meals  lisinopril 40 milliGRAM(s) Oral daily  meclizine 25 milliGRAM(s) Oral every 6 hours  sertraline 100 milliGRAM(s) Oral daily    MEDICATIONS  (PRN):  acetaminophen   Tablet. 650 milliGRAM(s) Oral every 6 hours PRN mild, moderate pain  dextrose Gel 1 Dose(s) Oral once PRN Blood Glucose LESS THAN 70 milliGRAM(s)/deciliter  glucagon  Injectable 1 milliGRAM(s) IntraMuscular once PRN Glucose LESS THAN 70 milligrams/deciliter          PHYSICAL EXAM:  Vital Signs Last 24 Hrs  T(C): 36.9 (06 May 2018 09:34), Max: 36.9 (06 May 2018 09:34)  T(F): 98.4 (06 May 2018 09:34), Max: 98.4 (06 May 2018 09:34)  HR: 68 (06 May 2018 09:34) (68 - 80)  BP: 176/72 (06 May 2018 09:34) (145/69 - 195/75)  BP(mean): --  RR: 18 (06 May 2018 09:34) (16 - 18)  SpO2: 96% (06 May 2018 09:34) (95% - 99%)  GENERAL: Alert, awake, NAD, well-developed  HEAD:  Atraumatic, Normocephalic  EYES: EOMI, PERRLA, conjunctiva and sclera clear  NECK: Supple, No JVD  CHEST/LUNG: Clear to auscultation bilaterally; No wheeze  HEART: Regular rate and rhythm; No murmurs, rubs, or gallops  ABDOMEN: Soft, Nontender, Nondistended; Bowel sounds present  EXTREMITIES:  2+ Peripheral Pulses, No clubbing, cyanosis, or edema  NEUROLOGY: Non-focal  SKIN: No Rashes or lesions      LABS:                        14.8   9.04  )-----------( 207      ( 06 May 2018 06:48 )             45.0     05-06    138  |  99  |  16  ----------------------------<  127<H>  3.7   |  27  |  0.75    Ca    9.0      06 May 2018 06:48          RADIOLOGY & ADDITIONAL TESTS:    Imaging Personally Reviewed:    Consultant(s) Notes Reviewed:      Care Discussed with Consultants/Other Providers:

## 2018-05-07 LAB
BUN SERPL-MCNC: 18 MG/DL — SIGNIFICANT CHANGE UP (ref 7–23)
CALCIUM SERPL-MCNC: 8.7 MG/DL — SIGNIFICANT CHANGE UP (ref 8.4–10.5)
CHLORIDE SERPL-SCNC: 98 MMOL/L — SIGNIFICANT CHANGE UP (ref 98–107)
CO2 SERPL-SCNC: 28 MMOL/L — SIGNIFICANT CHANGE UP (ref 22–31)
CREAT SERPL-MCNC: 0.78 MG/DL — SIGNIFICANT CHANGE UP (ref 0.5–1.3)
GLUCOSE SERPL-MCNC: 141 MG/DL — HIGH (ref 70–99)
HCT VFR BLD CALC: 45.2 % — HIGH (ref 34.5–45)
HGB BLD-MCNC: 14.9 G/DL — SIGNIFICANT CHANGE UP (ref 11.5–15.5)
MCHC RBC-ENTMCNC: 29.3 PG — SIGNIFICANT CHANGE UP (ref 27–34)
MCHC RBC-ENTMCNC: 33 % — SIGNIFICANT CHANGE UP (ref 32–36)
MCV RBC AUTO: 88.8 FL — SIGNIFICANT CHANGE UP (ref 80–100)
NRBC # FLD: 0 — SIGNIFICANT CHANGE UP
PLATELET # BLD AUTO: 205 K/UL — SIGNIFICANT CHANGE UP (ref 150–400)
PMV BLD: 10.3 FL — SIGNIFICANT CHANGE UP (ref 7–13)
POTASSIUM SERPL-MCNC: 3.8 MMOL/L — SIGNIFICANT CHANGE UP (ref 3.5–5.3)
POTASSIUM SERPL-SCNC: 3.8 MMOL/L — SIGNIFICANT CHANGE UP (ref 3.5–5.3)
RBC # BLD: 5.09 M/UL — SIGNIFICANT CHANGE UP (ref 3.8–5.2)
RBC # FLD: 11.9 % — SIGNIFICANT CHANGE UP (ref 10.3–14.5)
SODIUM SERPL-SCNC: 138 MMOL/L — SIGNIFICANT CHANGE UP (ref 135–145)
WBC # BLD: 8.6 K/UL — SIGNIFICANT CHANGE UP (ref 3.8–10.5)
WBC # FLD AUTO: 8.6 K/UL — SIGNIFICANT CHANGE UP (ref 3.8–10.5)

## 2018-05-07 PROCEDURE — 99232 SBSQ HOSP IP/OBS MODERATE 35: CPT | Mod: GC

## 2018-05-07 PROCEDURE — 99232 SBSQ HOSP IP/OBS MODERATE 35: CPT

## 2018-05-07 PROCEDURE — 99233 SBSQ HOSP IP/OBS HIGH 50: CPT

## 2018-05-07 RX ADMIN — GABAPENTIN 100 MILLIGRAM(S): 400 CAPSULE ORAL at 12:43

## 2018-05-07 RX ADMIN — Medication 25 MILLIGRAM(S): at 12:44

## 2018-05-07 RX ADMIN — Medication 6 UNIT(S): at 08:41

## 2018-05-07 RX ADMIN — SERTRALINE 100 MILLIGRAM(S): 25 TABLET, FILM COATED ORAL at 12:44

## 2018-05-07 RX ADMIN — ENOXAPARIN SODIUM 30 MILLIGRAM(S): 100 INJECTION SUBCUTANEOUS at 12:44

## 2018-05-07 RX ADMIN — Medication 2: at 12:47

## 2018-05-07 RX ADMIN — Medication 25 MILLIGRAM(S): at 17:44

## 2018-05-07 RX ADMIN — Medication 6 UNIT(S): at 17:44

## 2018-05-07 RX ADMIN — INSULIN GLARGINE 32 UNIT(S): 100 INJECTION, SOLUTION SUBCUTANEOUS at 22:11

## 2018-05-07 RX ADMIN — Medication 81 MILLIGRAM(S): at 12:44

## 2018-05-07 RX ADMIN — DONEPEZIL HYDROCHLORIDE 10 MILLIGRAM(S): 10 TABLET, FILM COATED ORAL at 21:09

## 2018-05-07 RX ADMIN — Medication 6 UNIT(S): at 12:48

## 2018-05-07 RX ADMIN — Medication 25 MILLIGRAM(S): at 23:46

## 2018-05-07 RX ADMIN — LISINOPRIL 40 MILLIGRAM(S): 2.5 TABLET ORAL at 05:20

## 2018-05-07 RX ADMIN — Medication 25 MILLIGRAM(S): at 05:20

## 2018-05-07 RX ADMIN — CLOPIDOGREL BISULFATE 75 MILLIGRAM(S): 75 TABLET, FILM COATED ORAL at 12:43

## 2018-05-07 RX ADMIN — ATORVASTATIN CALCIUM 80 MILLIGRAM(S): 80 TABLET, FILM COATED ORAL at 21:09

## 2018-05-07 RX ADMIN — AMLODIPINE BESYLATE 5 MILLIGRAM(S): 2.5 TABLET ORAL at 05:20

## 2018-05-07 NOTE — PROGRESS NOTE ADULT - ASSESSMENT
Left side weakness, gait abnormality   1. PT- bed mobility,transfers, gait and balance training  2. OT- ADL'S  3. CVA-Asa/Plvix   4. Patient would benefit from acute rehab, needs a multidisciplinary team including PT, OT and speech. Can tolerate 3 hours of therapy a day.  Will follow.

## 2018-05-07 NOTE — PROGRESS NOTE ADULT - PROBLEM SELECTOR PLAN 3
-Endo input appreciated. Decreased pre-meal and basal insulin, AM FS now at goal.  -Cont to monitor.

## 2018-05-07 NOTE — PROGRESS NOTE ADULT - SUBJECTIVE AND OBJECTIVE BOX
Chief Complaint: DM2    History: Tolerating po, no further hypoglycemia.    MEDICATIONS  (STANDING):  amLODIPine   Tablet 5 milliGRAM(s) Oral daily  aspirin enteric coated 81 milliGRAM(s) Oral daily  atorvastatin 80 milliGRAM(s) Oral at bedtime  clopidogrel Tablet 75 milliGRAM(s) Oral daily  dextrose 5%. 1000 milliLiter(s) (50 mL/Hr) IV Continuous <Continuous>  dextrose 50% Injectable 12.5 Gram(s) IV Push once  dextrose 50% Injectable 25 Gram(s) IV Push once  dextrose 50% Injectable 25 Gram(s) IV Push once  donepezil 10 milliGRAM(s) Oral at bedtime  enoxaparin Injectable 30 milliGRAM(s) SubCutaneous every 24 hours  gabapentin 100 milliGRAM(s) Oral daily  insulin glargine Injectable (LANTUS) 32 Unit(s) SubCutaneous at bedtime  insulin lispro (HumaLOG) corrective regimen sliding scale   SubCutaneous at bedtime  insulin lispro (HumaLOG) corrective regimen sliding scale   SubCutaneous three times a day before meals  insulin lispro Injectable (HumaLOG) 6 Unit(s) SubCutaneous three times a day before meals  lisinopril 40 milliGRAM(s) Oral daily  meclizine 25 milliGRAM(s) Oral every 6 hours  sertraline 100 milliGRAM(s) Oral daily    MEDICATIONS  (PRN):  acetaminophen   Tablet. 650 milliGRAM(s) Oral every 6 hours PRN mild, moderate pain  dextrose Gel 1 Dose(s) Oral once PRN Blood Glucose LESS THAN 70 milliGRAM(s)/deciliter  glucagon  Injectable 1 milliGRAM(s) IntraMuscular once PRN Glucose LESS THAN 70 milligrams/deciliter      Allergies    No Known Allergies    Intolerances      Review of Systems:    ALL OTHER SYSTEMS REVIEWED AND NEGATIVE    PHYSICAL EXAM:  VITALS: T(C): 36.7 (05-07-18 @ 12:40)  T(F): 98 (05-07-18 @ 12:40), Max: 98.8 (05-06-18 @ 21:55)  HR: 72 (05-07-18 @ 12:40) (68 - 74)  BP: 176/72 (05-07-18 @ 12:40) (159/70 - 176/72)  RR:  (18 - 18)  SpO2:  (98% - 99%)  Wt(kg): --  GENERAL: NAD, well-groomed, well-developed  EYES: No proptosis, no lid lag, anicteric  HEENT:  Atraumatic, Normocephalic, moist mucous membranes  NEURO: extraocular movements intact, no tremor  PSYCH: Alert and oriented x 3, normal affect, normal mood      CAPILLARY BLOOD GLUCOSE      POCT Blood Glucose.: 213 mg/dL (07 May 2018 12:46)  POCT Blood Glucose.: 141 mg/dL (07 May 2018 08:39)  POCT Blood Glucose.: 151 mg/dL (06 May 2018 21:34)  POCT Blood Glucose.: 167 mg/dL (06 May 2018 17:41)      05-07    138  |  98  |  18  ----------------------------<  141<H>  3.8   |  28  |  0.78    EGFR if : 90  EGFR if non : 78    Ca    8.7      05-07            Thyroid Function Tests:  04-27 @ 08:45 TSH 3.06 FreeT4 -- T3 -- Anti TPO -- Anti Thyroglobulin Ab -- TSI --      Hemoglobin A1C, Whole Blood: 9.1 % <H> [4.0 - 5.6] (04-27-18 @ 08:45)

## 2018-05-07 NOTE — PROGRESS NOTE ADULT - SUBJECTIVE AND OBJECTIVE BOX
CC: F/U for CVA    SUBJECTIVE / OVERNIGHT EVENTS:  No overnight issues.  No new complaints.  No F/C, N/V, CP, SOB, Cough, lightheadedness, dizziness, abdominal pain, diarrhea, dysuria.    MEDICATIONS  (STANDING):  amLODIPine   Tablet 5 milliGRAM(s) Oral daily  aspirin enteric coated 81 milliGRAM(s) Oral daily  atorvastatin 80 milliGRAM(s) Oral at bedtime  clopidogrel Tablet 75 milliGRAM(s) Oral daily  dextrose 5%. 1000 milliLiter(s) (50 mL/Hr) IV Continuous <Continuous>  dextrose 50% Injectable 12.5 Gram(s) IV Push once  dextrose 50% Injectable 25 Gram(s) IV Push once  dextrose 50% Injectable 25 Gram(s) IV Push once  donepezil 10 milliGRAM(s) Oral at bedtime  enoxaparin Injectable 30 milliGRAM(s) SubCutaneous every 24 hours  gabapentin 100 milliGRAM(s) Oral daily  insulin glargine Injectable (LANTUS) 32 Unit(s) SubCutaneous at bedtime  insulin lispro (HumaLOG) corrective regimen sliding scale   SubCutaneous at bedtime  insulin lispro (HumaLOG) corrective regimen sliding scale   SubCutaneous three times a day before meals  insulin lispro Injectable (HumaLOG) 6 Unit(s) SubCutaneous three times a day before meals  lisinopril 40 milliGRAM(s) Oral daily  meclizine 25 milliGRAM(s) Oral every 6 hours  sertraline 100 milliGRAM(s) Oral daily    MEDICATIONS  (PRN):  acetaminophen   Tablet. 650 milliGRAM(s) Oral every 6 hours PRN mild, moderate pain  dextrose Gel 1 Dose(s) Oral once PRN Blood Glucose LESS THAN 70 milliGRAM(s)/deciliter  glucagon  Injectable 1 milliGRAM(s) IntraMuscular once PRN Glucose LESS THAN 70 milligrams/deciliter      Vital Signs Last 24 Hrs  T(C): 36.7 (07 May 2018 12:40), Max: 37.1 (06 May 2018 21:55)  T(F): 98 (07 May 2018 12:40), Max: 98.8 (06 May 2018 21:55)  HR: 72 (07 May 2018 12:40) (68 - 74)  BP: 176/72 (07 May 2018 12:40) (159/70 - 176/72)  BP(mean): --  RR: 18 (07 May 2018 12:40) (18 - 18)  SpO2: 99% (07 May 2018 12:40) (98% - 99%)  CAPILLARY BLOOD GLUCOSE      POCT Blood Glucose.: 213 mg/dL (07 May 2018 12:46)  POCT Blood Glucose.: 141 mg/dL (07 May 2018 08:39)  POCT Blood Glucose.: 151 mg/dL (06 May 2018 21:34)  POCT Blood Glucose.: 167 mg/dL (06 May 2018 17:41)    I&O's Summary      PHYSICAL EXAM:  GENERAL: NAD, well-developed  HEAD:  Atraumatic, Normocephalic  EYES: EOMI, PERRLA, conjunctiva and sclera clear  NECK: Supple, No JVD  CHEST/LUNG: Clear to auscultation bilaterally; No wheeze  HEART: Regular rate and rhythm; No murmurs, rubs, or gallops  ABDOMEN: Soft, Nontender, Nondistended; Bowel sounds present  EXTREMITIES:  2+ Peripheral Pulses, No clubbing, cyanosis, or edema  PSYCH: Calm  NEUROLOGY: A/Ox3,  SKIN: No rashes or lesions    LABS:                        14.9   8.60  )-----------( 205      ( 07 May 2018 06:00 )             45.2     05-07    138  |  98  |  18  ----------------------------<  141<H>  3.8   |  28  |  0.78    Ca    8.7      07 May 2018 06:00                RADIOLOGY & ADDITIONAL TESTS:    Imaging Personally Reviewed:    Care Discussed with Consultants/Other Providers: Neurology - Dr. Rowan - discussion of stroke management.

## 2018-05-07 NOTE — PROGRESS NOTE ADULT - ASSESSMENT
67 year old woman PMH of insulin dependent DM Type II, HTN, p/w slurred speech left sided weakness and numbness x 2 days. MRI +RT paramedian louis infarct  +V4-V3 stenosis c/b orthostatic hypotension.

## 2018-05-07 NOTE — PROGRESS NOTE ADULT - PROBLEM SELECTOR PLAN 1
BG has been mostly at goal of 100-180mg/dl.  Continue Humalog 6/6/6  Continue Lantus 32 units qhs  C/W low correction scale  C/W consistent carb diet    DM education has already met with patient/daughter. Please see documentation 4/30 by PRADEEP Costa.  Plan for dc on basal bolus insulin. BG has been mostly at goal of 100-180mg/dl.  Continue Humalog 6/6/6  Continue Lantus 32 units qhs  C/W low correction scale  C/W consistent carb diet    DM education has already met with patient/daughter. Please see documentation 4/30 by PRADEEP Costa.  Plan for dc on basal bolus insulin. Has outside endocrinologist in [place].

## 2018-05-07 NOTE — PROGRESS NOTE ADULT - SUBJECTIVE AND OBJECTIVE BOX
HPI:  68 yo Romanian speaking woman PMH of insulin dependent DM Type II, HTN, mild Dementia, Depression, p/w slurred speechm left sided weakness and numbness x 2 days.  HPI was obtained through interpretation by daughter.  Pt states that left sided weakness and numbness started 2 days ago (Wednesday night). Pt states that she had associated drooling from the left side of her mouth and her tongue felt heavy.  Pt states that her "left leg wouldn't respond" and her left arm felt weak, so she was not able to walk.  She states that she believed it was due to her diabetes and went to sleep. The next morning her daughter called her and she states that the pt's speech was "incomprehensible," so the daughter called an ambulance.  Pt states that the last time she felt normal was Wednesday morning and that she has never had a stroke or these symptoms before. Pt states that left sided weakness has improved and was better yesterday than initial day.  Pt endorses intermittent lightheadedness when ambulating and blurry vision since Wednesday.  Pt denies CP, SOB, N/V/D, fever, chills, diaphoresis, tinnitus, edema, vertigo. She appears comfortable at this time. She is being admitted to telemetry to r/o cva.=> Of note: Pt was prescribed donepezil and sertraline, but ran out and has not refilled her prescription/has not been taking these medications for the past 2-3 months. (27 Apr 2018 11:34)    Interval:  MRI +RT paramedian louis infarct , On Asa/Plavix for 3 months, then Asa. Doing well, + pain in b/l feet, + burning, started on Gabapentin. Tolerating PT well. TTE w/ bubble-normal EF no obvious PFO noted. Dizziness a little better.       REVIEW OF SYSTEMS: No chest pain, shortness of breath, nausea, vomiting or diarrhea.          MEDICATIONS  (STANDING):  amLODIPine   Tablet 5 milliGRAM(s) Oral daily  aspirin enteric coated 81 milliGRAM(s) Oral daily  atorvastatin 80 milliGRAM(s) Oral at bedtime  clopidogrel Tablet 75 milliGRAM(s) Oral daily  dextrose 5%. 1000 milliLiter(s) (50 mL/Hr) IV Continuous <Continuous>  dextrose 50% Injectable 12.5 Gram(s) IV Push once  dextrose 50% Injectable 25 Gram(s) IV Push once  dextrose 50% Injectable 25 Gram(s) IV Push once  donepezil 10 milliGRAM(s) Oral at bedtime  enoxaparin Injectable 30 milliGRAM(s) SubCutaneous every 24 hours  gabapentin 100 milliGRAM(s) Oral daily  insulin glargine Injectable (LANTUS) 32 Unit(s) SubCutaneous at bedtime  insulin lispro (HumaLOG) corrective regimen sliding scale   SubCutaneous at bedtime  insulin lispro (HumaLOG) corrective regimen sliding scale   SubCutaneous three times a day before meals  insulin lispro Injectable (HumaLOG) 6 Unit(s) SubCutaneous three times a day before meals  lisinopril 40 milliGRAM(s) Oral daily  meclizine 25 milliGRAM(s) Oral every 6 hours  sertraline 100 milliGRAM(s) Oral daily    MEDICATIONS  (PRN):  acetaminophen   Tablet. 650 milliGRAM(s) Oral every 6 hours PRN mild, moderate pain  dextrose Gel 1 Dose(s) Oral once PRN Blood Glucose LESS THAN 70 milliGRAM(s)/deciliter  glucagon  Injectable 1 milliGRAM(s) IntraMuscular once PRN Glucose LESS THAN 70 milligrams/deciliter    Vital Signs Last 24 Hrs  T(C): 36.7 (07 May 2018 12:40), Max: 37.1 (06 May 2018 21:55)  T(F): 98 (07 May 2018 12:40), Max: 98.8 (06 May 2018 21:55)  HR: 72 (07 May 2018 12:40) (68 - 74)  BP: 176/72 (07 May 2018 12:40) (159/70 - 176/72)  BP(mean): --  RR: 18 (07 May 2018 12:40) (18 - 18)  SpO2: 99% (07 May 2018 12:40) (98% - 99%)  ----------------------------------------------------------------------------------------  PHYSICAL EXAM  Constitutional - NAD, Comfortable  HEENT - NCAT, EOMI  Neck - Supple, No limited ROM  Chest - CTA bilaterally, No wheeze, No rhonchi, No crackles  Extremities - No C/C/E, No calf tenderness   Neurologic Exam -                    Cognitive - Awake, Alert, AAO to self, place, date, year, situation     Communication - Fluent, No dysarthria, no aphasia     Cranial Nerves - CN 2-12 intact     Motor - LUE/LLE 4/5, RUE/RLE 5/5                       Sensory - Intact to LT     Reflexes - DTR Intact, No primitive reflexive     Balance - poor walking balance   Psychiatric - Mood stable, Affect WNL

## 2018-05-08 VITALS
RESPIRATION RATE: 18 BRPM | SYSTOLIC BLOOD PRESSURE: 151 MMHG | HEART RATE: 66 BPM | DIASTOLIC BLOOD PRESSURE: 71 MMHG | TEMPERATURE: 98 F | OXYGEN SATURATION: 98 %

## 2018-05-08 DIAGNOSIS — I95.1 ORTHOSTATIC HYPOTENSION: ICD-10-CM

## 2018-05-08 LAB
BASOPHILS # BLD AUTO: 0.11 K/UL — SIGNIFICANT CHANGE UP (ref 0–0.2)
BASOPHILS NFR BLD AUTO: 1.2 % — SIGNIFICANT CHANGE UP (ref 0–2)
BUN SERPL-MCNC: 19 MG/DL — SIGNIFICANT CHANGE UP (ref 7–23)
CALCIUM SERPL-MCNC: 8.9 MG/DL — SIGNIFICANT CHANGE UP (ref 8.4–10.5)
CHLORIDE SERPL-SCNC: 100 MMOL/L — SIGNIFICANT CHANGE UP (ref 98–107)
CO2 SERPL-SCNC: 28 MMOL/L — SIGNIFICANT CHANGE UP (ref 22–31)
CREAT SERPL-MCNC: 0.82 MG/DL — SIGNIFICANT CHANGE UP (ref 0.5–1.3)
EOSINOPHIL # BLD AUTO: 1.12 K/UL — HIGH (ref 0–0.5)
EOSINOPHIL NFR BLD AUTO: 12.7 % — HIGH (ref 0–6)
GLUCOSE SERPL-MCNC: 131 MG/DL — HIGH (ref 70–99)
HCT VFR BLD CALC: 45.8 % — HIGH (ref 34.5–45)
HGB BLD-MCNC: 14.9 G/DL — SIGNIFICANT CHANGE UP (ref 11.5–15.5)
IMM GRANULOCYTES # BLD AUTO: 0.03 # — SIGNIFICANT CHANGE UP
IMM GRANULOCYTES NFR BLD AUTO: 0.3 % — SIGNIFICANT CHANGE UP (ref 0–1.5)
LYMPHOCYTES # BLD AUTO: 2.24 K/UL — SIGNIFICANT CHANGE UP (ref 1–3.3)
LYMPHOCYTES # BLD AUTO: 25.4 % — SIGNIFICANT CHANGE UP (ref 13–44)
MAGNESIUM SERPL-MCNC: 1.4 MG/DL — LOW (ref 1.6–2.6)
MCHC RBC-ENTMCNC: 28.9 PG — SIGNIFICANT CHANGE UP (ref 27–34)
MCHC RBC-ENTMCNC: 32.5 % — SIGNIFICANT CHANGE UP (ref 32–36)
MCV RBC AUTO: 88.9 FL — SIGNIFICANT CHANGE UP (ref 80–100)
MONOCYTES # BLD AUTO: 0.89 K/UL — SIGNIFICANT CHANGE UP (ref 0–0.9)
MONOCYTES NFR BLD AUTO: 10.1 % — SIGNIFICANT CHANGE UP (ref 2–14)
NEUTROPHILS # BLD AUTO: 4.43 K/UL — SIGNIFICANT CHANGE UP (ref 1.8–7.4)
NEUTROPHILS NFR BLD AUTO: 50.3 % — SIGNIFICANT CHANGE UP (ref 43–77)
NRBC # FLD: 0 — SIGNIFICANT CHANGE UP
PLATELET # BLD AUTO: 200 K/UL — SIGNIFICANT CHANGE UP (ref 150–400)
PMV BLD: 10.1 FL — SIGNIFICANT CHANGE UP (ref 7–13)
POTASSIUM SERPL-MCNC: 3.6 MMOL/L — SIGNIFICANT CHANGE UP (ref 3.5–5.3)
POTASSIUM SERPL-SCNC: 3.6 MMOL/L — SIGNIFICANT CHANGE UP (ref 3.5–5.3)
RBC # BLD: 5.15 M/UL — SIGNIFICANT CHANGE UP (ref 3.8–5.2)
RBC # FLD: 11.9 % — SIGNIFICANT CHANGE UP (ref 10.3–14.5)
SODIUM SERPL-SCNC: 139 MMOL/L — SIGNIFICANT CHANGE UP (ref 135–145)
WBC # BLD: 8.82 K/UL — SIGNIFICANT CHANGE UP (ref 3.8–10.5)
WBC # FLD AUTO: 8.82 K/UL — SIGNIFICANT CHANGE UP (ref 3.8–10.5)

## 2018-05-08 PROCEDURE — 99239 HOSP IP/OBS DSCHRG MGMT >30: CPT

## 2018-05-08 RX ORDER — INSULIN ASPART 100 [IU]/ML
18 INJECTION, SOLUTION SUBCUTANEOUS
Qty: 0 | Refills: 0 | COMMUNITY

## 2018-05-08 RX ORDER — ATORVASTATIN CALCIUM 80 MG/1
1 TABLET, FILM COATED ORAL
Qty: 0 | Refills: 0 | COMMUNITY

## 2018-05-08 RX ORDER — MECLIZINE HCL 12.5 MG
1 TABLET ORAL
Qty: 0 | Refills: 0 | DISCHARGE
Start: 2018-05-08

## 2018-05-08 RX ORDER — ATENOLOL 25 MG/1
1 TABLET ORAL
Qty: 0 | Refills: 0 | COMMUNITY

## 2018-05-08 RX ORDER — ASPIRIN/CALCIUM CARB/MAGNESIUM 324 MG
1 TABLET ORAL
Qty: 0 | Refills: 0 | DISCHARGE
Start: 2018-05-08

## 2018-05-08 RX ORDER — INSULIN GLARGINE 100 [IU]/ML
50 INJECTION, SOLUTION SUBCUTANEOUS
Qty: 0 | Refills: 0 | COMMUNITY

## 2018-05-08 RX ORDER — FUROSEMIDE 40 MG
1 TABLET ORAL
Qty: 0 | Refills: 0 | COMMUNITY

## 2018-05-08 RX ORDER — MAGNESIUM SULFATE 500 MG/ML
1 VIAL (ML) INJECTION ONCE
Qty: 0 | Refills: 0 | Status: COMPLETED | OUTPATIENT
Start: 2018-05-08 | End: 2018-05-08

## 2018-05-08 RX ORDER — AMLODIPINE BESYLATE 2.5 MG/1
1 TABLET ORAL
Qty: 0 | Refills: 0 | DISCHARGE
Start: 2018-05-08

## 2018-05-08 RX ORDER — ATORVASTATIN CALCIUM 80 MG/1
1 TABLET, FILM COATED ORAL
Qty: 0 | Refills: 0 | COMMUNITY
Start: 2018-05-08

## 2018-05-08 RX ORDER — GABAPENTIN 400 MG/1
1 CAPSULE ORAL
Qty: 0 | Refills: 0 | DISCHARGE
Start: 2018-05-08

## 2018-05-08 RX ORDER — DEXTROSE 50 % IN WATER 50 %
12.5 SYRINGE (ML) INTRAVENOUS ONCE
Qty: 0 | Refills: 0 | Status: DISCONTINUED | OUTPATIENT
Start: 2018-05-08 | End: 2018-05-08

## 2018-05-08 RX ORDER — ACETAMINOPHEN 500 MG
2 TABLET ORAL
Qty: 0 | Refills: 0 | DISCHARGE
Start: 2018-05-08

## 2018-05-08 RX ORDER — CLOPIDOGREL BISULFATE 75 MG/1
1 TABLET, FILM COATED ORAL
Qty: 0 | Refills: 0 | DISCHARGE
Start: 2018-05-08

## 2018-05-08 RX ORDER — AMLODIPINE BESYLATE 2.5 MG/1
1 TABLET ORAL
Qty: 0 | Refills: 0 | COMMUNITY

## 2018-05-08 RX ADMIN — Medication 6 UNIT(S): at 08:53

## 2018-05-08 RX ADMIN — Medication 25 MILLIGRAM(S): at 05:01

## 2018-05-08 RX ADMIN — ENOXAPARIN SODIUM 30 MILLIGRAM(S): 100 INJECTION SUBCUTANEOUS at 11:16

## 2018-05-08 RX ADMIN — GABAPENTIN 100 MILLIGRAM(S): 400 CAPSULE ORAL at 11:14

## 2018-05-08 RX ADMIN — CLOPIDOGREL BISULFATE 75 MILLIGRAM(S): 75 TABLET, FILM COATED ORAL at 11:14

## 2018-05-08 RX ADMIN — Medication 25 MILLIGRAM(S): at 11:14

## 2018-05-08 RX ADMIN — Medication 25 MILLIGRAM(S): at 17:16

## 2018-05-08 RX ADMIN — Medication 81 MILLIGRAM(S): at 11:14

## 2018-05-08 RX ADMIN — LISINOPRIL 40 MILLIGRAM(S): 2.5 TABLET ORAL at 05:01

## 2018-05-08 RX ADMIN — AMLODIPINE BESYLATE 5 MILLIGRAM(S): 2.5 TABLET ORAL at 05:01

## 2018-05-08 RX ADMIN — SERTRALINE 100 MILLIGRAM(S): 25 TABLET, FILM COATED ORAL at 11:14

## 2018-05-08 RX ADMIN — Medication 6 UNIT(S): at 13:12

## 2018-05-08 RX ADMIN — Medication 100 GRAM(S): at 11:14

## 2018-05-08 NOTE — PROGRESS NOTE ADULT - PROBLEM SELECTOR PROBLEM 5
Depression, unspecified depression type
Mild dementia
Depression, unspecified depression type

## 2018-05-08 NOTE — PROGRESS NOTE ADULT - PROBLEM SELECTOR PLAN 4
Continue donepezil for now. Possibly contributing to orthostasis? Cont to monitor.
Continue donepezil
-Endo input appreciated. Decreased pre-meal and basal insulin, AM FS now at goal.  -Cont to monitor.
Continue donepezil
Continue donepezil for now. Possibly contributing to orthostasis? Cont to monitor.
Continue donepezil for now. Possibly contributing to orthostasis? Cont to monitor.
Continue donepezil

## 2018-05-08 NOTE — PROGRESS NOTE ADULT - PROBLEM SELECTOR PROBLEM 6
Prophylactic measure
Depression, unspecified depression type
Prophylactic measure

## 2018-05-08 NOTE — PROGRESS NOTE ADULT - PROBLEM SELECTOR PROBLEM 1
Cerebrovascular accident (CVA), unspecified mechanism
Type 2 diabetes mellitus with other specified complication, with long-term current use of insulin
Cerebrovascular accident (CVA), unspecified mechanism
Cerebrovascular accident (CVA), unspecified mechanism

## 2018-05-08 NOTE — PROGRESS NOTE ADULT - PROVIDER SPECIALTY LIST ADULT
Endocrinology
Hospitalist
Internal Medicine
Neurology
Rehab Medicine
Endocrinology
Hospitalist
Internal Medicine
Internal Medicine

## 2018-05-08 NOTE — PROGRESS NOTE ADULT - PROBLEM SELECTOR PROBLEM 2
Hypertension, unspecified type

## 2018-05-08 NOTE — PROGRESS NOTE ADULT - ATTENDING COMMENTS
67F uncontrolled DM2 - agree with basal bolus as outlined above.
Patient medically optimized for discharge to [facility].  Discharge planning 40 minutes - discussed with patient and consultants.
Patient medically optimized for discharge to home.  Discharge planning 40 minutes - discussed with patient and consultants.

## 2018-05-08 NOTE — PROGRESS NOTE ADULT - PROBLEM SELECTOR PROBLEM 4
Mild dementia
Type 2 diabetes mellitus with other specified complication, with long-term current use of insulin
Mild dementia

## 2018-05-08 NOTE — PROGRESS NOTE ADULT - SUBJECTIVE AND OBJECTIVE BOX
CC: F/U for CVA    SUBJECTIVE / OVERNIGHT EVENTS:  NO new events overnight.  No F/C, N/V, CP, SOB, Cough, lightheadedness, dizziness, abdominal pain, diarrhea, dysuria.    MEDICATIONS  (STANDING):  amLODIPine   Tablet 5 milliGRAM(s) Oral daily  aspirin enteric coated 81 milliGRAM(s) Oral daily  atorvastatin 80 milliGRAM(s) Oral at bedtime  clopidogrel Tablet 75 milliGRAM(s) Oral daily  dextrose 5%. 1000 milliLiter(s) (50 mL/Hr) IV Continuous <Continuous>  dextrose 50% Injectable 12.5 Gram(s) IV Push once  dextrose 50% Injectable 25 Gram(s) IV Push once  dextrose 50% Injectable 25 Gram(s) IV Push once  donepezil 10 milliGRAM(s) Oral at bedtime  enoxaparin Injectable 30 milliGRAM(s) SubCutaneous every 24 hours  gabapentin 100 milliGRAM(s) Oral daily  insulin glargine Injectable (LANTUS) 32 Unit(s) SubCutaneous at bedtime  insulin lispro (HumaLOG) corrective regimen sliding scale   SubCutaneous at bedtime  insulin lispro (HumaLOG) corrective regimen sliding scale   SubCutaneous three times a day before meals  insulin lispro Injectable (HumaLOG) 6 Unit(s) SubCutaneous three times a day before meals  lisinopril 40 milliGRAM(s) Oral daily  meclizine 25 milliGRAM(s) Oral every 6 hours  sertraline 100 milliGRAM(s) Oral daily    MEDICATIONS  (PRN):  acetaminophen   Tablet. 650 milliGRAM(s) Oral every 6 hours PRN mild, moderate pain  dextrose Gel 1 Dose(s) Oral once PRN Blood Glucose LESS THAN 70 milliGRAM(s)/deciliter  glucagon  Injectable 1 milliGRAM(s) IntraMuscular once PRN Glucose LESS THAN 70 milligrams/deciliter      Vital Signs Last 24 Hrs  T(C): 36.8 (08 May 2018 04:49), Max: 36.9 (07 May 2018 17:49)  T(F): 98.2 (08 May 2018 04:49), Max: 98.5 (07 May 2018 17:49)  HR: 67 (08 May 2018 04:49) (65 - 70)  BP: 159/71 (08 May 2018 04:49) (149/67 - 164/65)  BP(mean): --  RR: 18 (08 May 2018 04:49) (18 - 18)  SpO2: 96% (08 May 2018 04:49) (96% - 99%)  CAPILLARY BLOOD GLUCOSE      POCT Blood Glucose.: 121 mg/dL (08 May 2018 12:43)  POCT Blood Glucose.: 103 mg/dL (08 May 2018 08:43)  POCT Blood Glucose.: 165 mg/dL (07 May 2018 21:49)  POCT Blood Glucose.: 137 mg/dL (07 May 2018 17:33)    I&O's Summary      PHYSICAL EXAM:  GENERAL: NAD, well-developed  HEAD:  Atraumatic, Normocephalic  EYES: EOMI, PERRLA, conjunctiva and sclera clear  NECK: Supple, No JVD  CHEST/LUNG: Clear to auscultation bilaterally; No wheeze  HEART: Regular rate and rhythm; No murmurs, rubs, or gallops  ABDOMEN: Soft, Nontender, Nondistended; Bowel sounds present  EXTREMITIES:  2+ Peripheral Pulses, No clubbing, cyanosis, or edema  PSYCH: Calm  NEUROLOGY: A/Ox3,  SKIN: No rashes or lesions    LABS:                        14.9   8.82  )-----------( 200      ( 08 May 2018 04:50 )             45.8     05-08    139  |  100  |  19  ----------------------------<  131<H>  3.6   |  28  |  0.82    Ca    8.9      08 May 2018 04:50  Mg     1.4     05-08                RADIOLOGY & ADDITIONAL TESTS:    Imaging Personally Reviewed:    Care Discussed with Consultants/Other Providers: Neurology - Dr. Rowan - discussion of stroke management.

## 2018-05-08 NOTE — PROGRESS NOTE ADULT - PROBLEM SELECTOR PLAN 5
-cont Zoloft
Continue donepezil for now. Possibly contributing to orthostasis? Cont to monitor.

## 2018-05-08 NOTE — PROGRESS NOTE ADULT - PROBLEM SELECTOR PLAN 6
IMPROVE VTE Individual Risk Assessment lovenox     RISK                                                          Points  [] Previous VTE                                           3  [] Thrombophilia                                        2  [] Lower limb paralysis                              2   [] Current Cancer                                       2   [x] Immobilization > 24 hrs                        1  [] ICU/CCU stay > 24 hours                       1  [x] Age > 60                                                   1    IMPROVE VTE Score:lovenox sq
-lovenox SQ
IMPROVE VTE Individual Risk Assessment lovenox     RISK                                                          Points  [] Previous VTE                                           3  [] Thrombophilia                                        2  [] Lower limb paralysis                              2   [] Current Cancer                                       2   [x] Immobilization > 24 hrs                        1  [] ICU/CCU stay > 24 hours                       1  [x] Age > 60                                                   1    IMPROVE VTE Score:lovenox sq
-cont Zoloft
-lovenox SQ
-lovenox SQ
IMPROVE VTE Individual Risk Assessment lovenox     RISK                                                          Points  [] Previous VTE                                           3  [] Thrombophilia                                        2  [] Lower limb paralysis                              2   [] Current Cancer                                       2   [x] Immobilization > 24 hrs                        1  [] ICU/CCU stay > 24 hours                       1  [x] Age > 60                                                   1    IMPROVE VTE Score:lovenox sq

## 2018-05-08 NOTE — PROGRESS NOTE ADULT - PROBLEM SELECTOR PROBLEM 3
Type 2 diabetes mellitus with other specified complication, with long-term current use of insulin
Type 2 diabetes mellitus with other specified complication, with long-term current use of insulin
Orthostatic hypotension
Type 2 diabetes mellitus with other specified complication, with long-term current use of insulin
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
## 2019-07-09 ENCOUNTER — INPATIENT (INPATIENT)
Facility: HOSPITAL | Age: 69
LOS: 3 days | Discharge: HOME HEALTH | End: 2019-07-13
Attending: HOSPITALIST | Admitting: HOSPITALIST
Payer: MEDICAID

## 2019-07-09 VITALS
OXYGEN SATURATION: 95 % | HEART RATE: 53 BPM | RESPIRATION RATE: 16 BRPM | DIASTOLIC BLOOD PRESSURE: 53 MMHG | SYSTOLIC BLOOD PRESSURE: 122 MMHG | TEMPERATURE: 98 F

## 2019-07-09 DIAGNOSIS — Z90.49 ACQUIRED ABSENCE OF OTHER SPECIFIED PARTS OF DIGESTIVE TRACT: Chronic | ICD-10-CM

## 2019-07-09 NOTE — ED ADULT TRIAGE NOTE - CHIEF COMPLAINT QUOTE
pt arrives s/p syncopal episode. pt states she slid off the bed denies hitting her head. pt had 4 teeth extracted today on Plavix actively bleeding from mouth . pt denies any pain. pmh CVA, DM, HTN ekg in progress pt arrives s/p syncopal episode. pt states she slid off the bed denies hitting her head. pt had 4 teeth extracted today on Plavix actively bleeding from mouth . pt arrives w/ 20 right hand by EMS with NS infusing. pt denies any pain. pmh CVA, DM, HTN ekg in progress

## 2019-07-10 DIAGNOSIS — Z87.19 PERSONAL HISTORY OF OTHER DISEASES OF THE DIGESTIVE SYSTEM: Chronic | ICD-10-CM

## 2019-07-10 DIAGNOSIS — F32.9 MAJOR DEPRESSIVE DISORDER, SINGLE EPISODE, UNSPECIFIED: ICD-10-CM

## 2019-07-10 DIAGNOSIS — Z90.49 ACQUIRED ABSENCE OF OTHER SPECIFIED PARTS OF DIGESTIVE TRACT: Chronic | ICD-10-CM

## 2019-07-10 DIAGNOSIS — Z98.891 HISTORY OF UTERINE SCAR FROM PREVIOUS SURGERY: Chronic | ICD-10-CM

## 2019-07-10 DIAGNOSIS — K92.2 GASTROINTESTINAL HEMORRHAGE, UNSPECIFIED: ICD-10-CM

## 2019-07-10 DIAGNOSIS — E11.9 TYPE 2 DIABETES MELLITUS WITHOUT COMPLICATIONS: ICD-10-CM

## 2019-07-10 DIAGNOSIS — R55 SYNCOPE AND COLLAPSE: ICD-10-CM

## 2019-07-10 DIAGNOSIS — Z29.9 ENCOUNTER FOR PROPHYLACTIC MEASURES, UNSPECIFIED: ICD-10-CM

## 2019-07-10 DIAGNOSIS — D64.9 ANEMIA, UNSPECIFIED: ICD-10-CM

## 2019-07-10 DIAGNOSIS — M79.669 PAIN IN UNSPECIFIED LOWER LEG: ICD-10-CM

## 2019-07-10 DIAGNOSIS — I10 ESSENTIAL (PRIMARY) HYPERTENSION: ICD-10-CM

## 2019-07-10 DIAGNOSIS — E78.5 HYPERLIPIDEMIA, UNSPECIFIED: ICD-10-CM

## 2019-07-10 PROBLEM — F03.90 UNSPECIFIED DEMENTIA WITHOUT BEHAVIORAL DISTURBANCE: Chronic | Status: ACTIVE | Noted: 2018-04-27

## 2019-07-10 LAB
ALBUMIN SERPL ELPH-MCNC: 3 G/DL — LOW (ref 3.3–5)
ALBUMIN SERPL ELPH-MCNC: 3.2 G/DL — LOW (ref 3.3–5)
ALP SERPL-CCNC: 138 U/L — HIGH (ref 40–120)
ALP SERPL-CCNC: 161 U/L — HIGH (ref 40–120)
ALT FLD-CCNC: 20 U/L — SIGNIFICANT CHANGE UP (ref 4–33)
ALT FLD-CCNC: 22 U/L — SIGNIFICANT CHANGE UP (ref 4–33)
ANION GAP SERPL CALC-SCNC: 11 MMO/L — SIGNIFICANT CHANGE UP (ref 7–14)
ANION GAP SERPL CALC-SCNC: 17 MMO/L — HIGH (ref 7–14)
ANION GAP SERPL CALC-SCNC: 8 MMO/L — SIGNIFICANT CHANGE UP (ref 7–14)
APPEARANCE UR: SIGNIFICANT CHANGE UP
AST SERPL-CCNC: 25 U/L — SIGNIFICANT CHANGE UP (ref 4–32)
AST SERPL-CCNC: 27 U/L — SIGNIFICANT CHANGE UP (ref 4–32)
B-OH-BUTYR SERPL-SCNC: 0.3 MMOL/L — SIGNIFICANT CHANGE UP (ref 0–0.4)
BACTERIA # UR AUTO: HIGH
BASE EXCESS BLDV CALC-SCNC: -5 MMOL/L — SIGNIFICANT CHANGE UP
BASE EXCESS BLDV CALC-SCNC: 3.5 MMOL/L — SIGNIFICANT CHANGE UP
BASOPHILS # BLD AUTO: 0.09 K/UL — SIGNIFICANT CHANGE UP (ref 0–0.2)
BASOPHILS NFR BLD AUTO: 0.8 % — SIGNIFICANT CHANGE UP (ref 0–2)
BILIRUB SERPL-MCNC: 0.4 MG/DL — SIGNIFICANT CHANGE UP (ref 0.2–1.2)
BILIRUB SERPL-MCNC: 0.6 MG/DL — SIGNIFICANT CHANGE UP (ref 0.2–1.2)
BILIRUB UR-MCNC: NEGATIVE — SIGNIFICANT CHANGE UP
BLD GP AB SCN SERPL QL: NEGATIVE — SIGNIFICANT CHANGE UP
BLOOD GAS VENOUS - CREATININE: 0.58 MG/DL — SIGNIFICANT CHANGE UP (ref 0.5–1.3)
BLOOD GAS VENOUS - CREATININE: 0.86 MG/DL — SIGNIFICANT CHANGE UP (ref 0.5–1.3)
BLOOD GAS VENOUS - FIO2: 21 — SIGNIFICANT CHANGE UP
BLOOD GAS VENOUS - FIO2: 21 — SIGNIFICANT CHANGE UP
BLOOD UR QL VISUAL: HIGH
BUN SERPL-MCNC: 24 MG/DL — HIGH (ref 7–23)
BUN SERPL-MCNC: 31 MG/DL — HIGH (ref 7–23)
BUN SERPL-MCNC: 36 MG/DL — HIGH (ref 7–23)
CALCIUM SERPL-MCNC: 6.1 MG/DL — CRITICAL LOW (ref 8.4–10.5)
CALCIUM SERPL-MCNC: 8.1 MG/DL — LOW (ref 8.4–10.5)
CALCIUM SERPL-MCNC: 9 MG/DL — SIGNIFICANT CHANGE UP (ref 8.4–10.5)
CHLORIDE BLDV-SCNC: 101 MMOL/L — SIGNIFICANT CHANGE UP (ref 96–108)
CHLORIDE BLDV-SCNC: 115 MMOL/L — HIGH (ref 96–108)
CHLORIDE SERPL-SCNC: 102 MMOL/L — SIGNIFICANT CHANGE UP (ref 98–107)
CHLORIDE SERPL-SCNC: 116 MMOL/L — HIGH (ref 98–107)
CHLORIDE SERPL-SCNC: 99 MMOL/L — SIGNIFICANT CHANGE UP (ref 98–107)
CK MB BLD-MCNC: 1.84 NG/ML — SIGNIFICANT CHANGE UP (ref 1–4.7)
CK MB BLD-MCNC: SIGNIFICANT CHANGE UP (ref 0–2.5)
CK SERPL-CCNC: 58 U/L — SIGNIFICANT CHANGE UP (ref 25–170)
CO2 SERPL-SCNC: 18 MMOL/L — LOW (ref 22–31)
CO2 SERPL-SCNC: 19 MMOL/L — LOW (ref 22–31)
CO2 SERPL-SCNC: 25 MMOL/L — SIGNIFICANT CHANGE UP (ref 22–31)
COLOR SPEC: YELLOW — SIGNIFICANT CHANGE UP
CREAT SERPL-MCNC: 0.59 MG/DL — SIGNIFICANT CHANGE UP (ref 0.5–1.3)
CREAT SERPL-MCNC: 0.81 MG/DL — SIGNIFICANT CHANGE UP (ref 0.5–1.3)
CREAT SERPL-MCNC: 0.95 MG/DL — SIGNIFICANT CHANGE UP (ref 0.5–1.3)
EOSINOPHIL # BLD AUTO: 0.34 K/UL — SIGNIFICANT CHANGE UP (ref 0–0.5)
EOSINOPHIL NFR BLD AUTO: 3.1 % — SIGNIFICANT CHANGE UP (ref 0–6)
GAS PNL BLDV: 132 MMOL/L — LOW (ref 136–146)
GAS PNL BLDV: 137 MMOL/L — SIGNIFICANT CHANGE UP (ref 136–146)
GLUCOSE BLDV-MCNC: 251 MG/DL — HIGH (ref 70–99)
GLUCOSE BLDV-MCNC: 390 MG/DL — HIGH (ref 70–99)
GLUCOSE SERPL-MCNC: 276 MG/DL — HIGH (ref 70–99)
GLUCOSE SERPL-MCNC: 341 MG/DL — HIGH (ref 70–99)
GLUCOSE SERPL-MCNC: 448 MG/DL — HIGH (ref 70–99)
GLUCOSE UR-MCNC: NEGATIVE — SIGNIFICANT CHANGE UP
HBA1C BLD-MCNC: 8.4 % — HIGH (ref 4–5.6)
HCO3 BLDV-SCNC: 20 MMOL/L — SIGNIFICANT CHANGE UP (ref 20–27)
HCO3 BLDV-SCNC: 26 MMOL/L — SIGNIFICANT CHANGE UP (ref 20–27)
HCT VFR BLD CALC: 20.9 % — CRITICAL LOW (ref 34.5–45)
HCT VFR BLD CALC: 29.3 % — LOW (ref 34.5–45)
HCT VFR BLDV CALC: 21.9 % — LOW (ref 34.5–45)
HCT VFR BLDV CALC: 28.9 % — LOW (ref 34.5–45)
HGB BLD-MCNC: 6.8 G/DL — CRITICAL LOW (ref 11.5–15.5)
HGB BLD-MCNC: 9.2 G/DL — LOW (ref 11.5–15.5)
HGB BLDV-MCNC: 7 G/DL — CRITICAL LOW (ref 11.5–15.5)
HGB BLDV-MCNC: 9.3 G/DL — LOW (ref 11.5–15.5)
HYALINE CASTS # UR AUTO: NEGATIVE — SIGNIFICANT CHANGE UP
IMM GRANULOCYTES NFR BLD AUTO: 0.5 % — SIGNIFICANT CHANGE UP (ref 0–1.5)
KETONES UR-MCNC: NEGATIVE — SIGNIFICANT CHANGE UP
LACTATE BLDV-MCNC: 1.6 MMOL/L — SIGNIFICANT CHANGE UP (ref 0.5–2)
LACTATE BLDV-MCNC: 3.1 MMOL/L — HIGH (ref 0.5–2)
LEUKOCYTE ESTERASE UR-ACNC: SIGNIFICANT CHANGE UP
LYMPHOCYTES # BLD AUTO: 18.7 % — SIGNIFICANT CHANGE UP (ref 13–44)
LYMPHOCYTES # BLD AUTO: 2.07 K/UL — SIGNIFICANT CHANGE UP (ref 1–3.3)
MAGNESIUM SERPL-MCNC: 1.3 MG/DL — LOW (ref 1.6–2.6)
MAGNESIUM SERPL-MCNC: 1.3 MG/DL — LOW (ref 1.6–2.6)
MCHC RBC-ENTMCNC: 28.8 PG — SIGNIFICANT CHANGE UP (ref 27–34)
MCHC RBC-ENTMCNC: 29.6 PG — SIGNIFICANT CHANGE UP (ref 27–34)
MCHC RBC-ENTMCNC: 31.4 % — LOW (ref 32–36)
MCHC RBC-ENTMCNC: 32.5 % — SIGNIFICANT CHANGE UP (ref 32–36)
MCV RBC AUTO: 90.9 FL — SIGNIFICANT CHANGE UP (ref 80–100)
MCV RBC AUTO: 91.6 FL — SIGNIFICANT CHANGE UP (ref 80–100)
MONOCYTES # BLD AUTO: 0.76 K/UL — SIGNIFICANT CHANGE UP (ref 0–0.9)
MONOCYTES NFR BLD AUTO: 6.9 % — SIGNIFICANT CHANGE UP (ref 2–14)
NEUTROPHILS # BLD AUTO: 7.77 K/UL — HIGH (ref 1.8–7.4)
NEUTROPHILS NFR BLD AUTO: 70 % — SIGNIFICANT CHANGE UP (ref 43–77)
NITRITE UR-MCNC: POSITIVE — HIGH
NRBC # FLD: 0 K/UL — SIGNIFICANT CHANGE UP (ref 0–0)
NRBC # FLD: 0.02 K/UL — SIGNIFICANT CHANGE UP (ref 0–0)
OB PNL STL: POSITIVE — SIGNIFICANT CHANGE UP
PCO2 BLDV: 43 MMHG — SIGNIFICANT CHANGE UP (ref 41–51)
PCO2 BLDV: 58 MMHG — HIGH (ref 41–51)
PH BLDV: 7.3 PH — LOW (ref 7.32–7.43)
PH BLDV: 7.32 PH — SIGNIFICANT CHANGE UP (ref 7.32–7.43)
PH UR: 6 — SIGNIFICANT CHANGE UP (ref 5–8)
PHOSPHATE SERPL-MCNC: 4.7 MG/DL — HIGH (ref 2.5–4.5)
PLATELET # BLD AUTO: 111 K/UL — LOW (ref 150–400)
PLATELET # BLD AUTO: 191 K/UL — SIGNIFICANT CHANGE UP (ref 150–400)
PMV BLD: 10.8 FL — SIGNIFICANT CHANGE UP (ref 7–13)
PMV BLD: 10.9 FL — SIGNIFICANT CHANGE UP (ref 7–13)
PO2 BLDV: 17 MMHG — LOW (ref 35–40)
PO2 BLDV: 34 MMHG — LOW (ref 35–40)
POTASSIUM BLDV-SCNC: 3 MMOL/L — LOW (ref 3.4–4.5)
POTASSIUM BLDV-SCNC: 4.7 MMOL/L — HIGH (ref 3.4–4.5)
POTASSIUM SERPL-MCNC: 3.3 MMOL/L — LOW (ref 3.5–5.3)
POTASSIUM SERPL-MCNC: 4.7 MMOL/L — SIGNIFICANT CHANGE UP (ref 3.5–5.3)
POTASSIUM SERPL-MCNC: 4.8 MMOL/L — SIGNIFICANT CHANGE UP (ref 3.5–5.3)
POTASSIUM SERPL-SCNC: 3.3 MMOL/L — LOW (ref 3.5–5.3)
POTASSIUM SERPL-SCNC: 4.7 MMOL/L — SIGNIFICANT CHANGE UP (ref 3.5–5.3)
POTASSIUM SERPL-SCNC: 4.8 MMOL/L — SIGNIFICANT CHANGE UP (ref 3.5–5.3)
PROT SERPL-MCNC: 5.3 G/DL — LOW (ref 6–8.3)
PROT SERPL-MCNC: 5.8 G/DL — LOW (ref 6–8.3)
PROT UR-MCNC: 50 — SIGNIFICANT CHANGE UP
RBC # BLD: 2.3 M/UL — LOW (ref 3.8–5.2)
RBC # BLD: 3.2 M/UL — LOW (ref 3.8–5.2)
RBC # FLD: 12 % — SIGNIFICANT CHANGE UP (ref 10.3–14.5)
RBC # FLD: 12.2 % — SIGNIFICANT CHANGE UP (ref 10.3–14.5)
RBC CASTS # UR COMP ASSIST: HIGH (ref 0–?)
RH IG SCN BLD-IMP: POSITIVE — SIGNIFICANT CHANGE UP
RH IG SCN BLD-IMP: POSITIVE — SIGNIFICANT CHANGE UP
SAO2 % BLDV: 16.4 % — LOW (ref 60–85)
SAO2 % BLDV: 57.6 % — LOW (ref 60–85)
SODIUM SERPL-SCNC: 134 MMOL/L — LOW (ref 135–145)
SODIUM SERPL-SCNC: 138 MMOL/L — SIGNIFICANT CHANGE UP (ref 135–145)
SODIUM SERPL-SCNC: 143 MMOL/L — SIGNIFICANT CHANGE UP (ref 135–145)
SP GR SPEC: 1.02 — SIGNIFICANT CHANGE UP (ref 1–1.04)
SQUAMOUS # UR AUTO: SIGNIFICANT CHANGE UP
TROPONIN T, HIGH SENSITIVITY: 11 NG/L — SIGNIFICANT CHANGE UP (ref ?–14)
TROPONIN T, HIGH SENSITIVITY: 9 NG/L — SIGNIFICANT CHANGE UP (ref ?–14)
UROBILINOGEN FLD QL: NORMAL — SIGNIFICANT CHANGE UP
WBC # BLD: 11.08 K/UL — HIGH (ref 3.8–10.5)
WBC # BLD: 6.43 K/UL — SIGNIFICANT CHANGE UP (ref 3.8–10.5)
WBC # FLD AUTO: 11.08 K/UL — HIGH (ref 3.8–10.5)
WBC # FLD AUTO: 6.43 K/UL — SIGNIFICANT CHANGE UP (ref 3.8–10.5)
WBC UR QL: >50 — HIGH (ref 0–?)

## 2019-07-10 PROCEDURE — 70450 CT HEAD/BRAIN W/O DYE: CPT | Mod: 26

## 2019-07-10 PROCEDURE — 71045 X-RAY EXAM CHEST 1 VIEW: CPT | Mod: 26

## 2019-07-10 PROCEDURE — 99221 1ST HOSP IP/OBS SF/LOW 40: CPT | Mod: GC

## 2019-07-10 RX ORDER — PANTOPRAZOLE SODIUM 20 MG/1
40 TABLET, DELAYED RELEASE ORAL ONCE
Refills: 0 | Status: COMPLETED | OUTPATIENT
Start: 2019-07-10 | End: 2019-07-10

## 2019-07-10 RX ORDER — INSULIN LISPRO 100/ML
4 VIAL (ML) SUBCUTANEOUS ONCE
Refills: 0 | Status: DISCONTINUED | OUTPATIENT
Start: 2019-07-10 | End: 2019-07-10

## 2019-07-10 RX ORDER — DEXTROSE 50 % IN WATER 50 %
12.5 SYRINGE (ML) INTRAVENOUS ONCE
Refills: 0 | Status: DISCONTINUED | OUTPATIENT
Start: 2019-07-10 | End: 2019-07-13

## 2019-07-10 RX ORDER — SODIUM CHLORIDE 9 MG/ML
1000 INJECTION INTRAMUSCULAR; INTRAVENOUS; SUBCUTANEOUS ONCE
Refills: 0 | Status: COMPLETED | OUTPATIENT
Start: 2019-07-10 | End: 2019-07-10

## 2019-07-10 RX ORDER — ALBUTEROL 90 UG/1
2 AEROSOL, METERED ORAL
Qty: 0 | Refills: 0 | DISCHARGE

## 2019-07-10 RX ORDER — INSULIN LISPRO 100/ML
VIAL (ML) SUBCUTANEOUS AT BEDTIME
Refills: 0 | Status: DISCONTINUED | OUTPATIENT
Start: 2019-07-10 | End: 2019-07-13

## 2019-07-10 RX ORDER — LISINOPRIL 2.5 MG/1
40 TABLET ORAL DAILY
Refills: 0 | Status: DISCONTINUED | OUTPATIENT
Start: 2019-07-10 | End: 2019-07-13

## 2019-07-10 RX ORDER — AMLODIPINE BESYLATE 2.5 MG/1
1 TABLET ORAL
Qty: 0 | Refills: 0 | DISCHARGE

## 2019-07-10 RX ORDER — INSULIN GLARGINE 100 [IU]/ML
20 INJECTION, SOLUTION SUBCUTANEOUS AT BEDTIME
Refills: 0 | Status: DISCONTINUED | OUTPATIENT
Start: 2019-07-10 | End: 2019-07-13

## 2019-07-10 RX ORDER — ENOXAPARIN SODIUM 100 MG/ML
20 INJECTION SUBCUTANEOUS
Qty: 0 | Refills: 0 | DISCHARGE

## 2019-07-10 RX ORDER — INSULIN GLARGINE 100 [IU]/ML
20 INJECTION, SOLUTION SUBCUTANEOUS ONCE
Refills: 0 | Status: COMPLETED | OUTPATIENT
Start: 2019-07-10 | End: 2019-07-10

## 2019-07-10 RX ORDER — INSULIN LISPRO 100/ML
6 VIAL (ML) SUBCUTANEOUS
Qty: 0 | Refills: 0 | DISCHARGE

## 2019-07-10 RX ORDER — SERTRALINE 25 MG/1
100 TABLET, FILM COATED ORAL DAILY
Refills: 0 | Status: DISCONTINUED | OUTPATIENT
Start: 2019-07-10 | End: 2019-07-13

## 2019-07-10 RX ORDER — DEXTROSE 50 % IN WATER 50 %
15 SYRINGE (ML) INTRAVENOUS ONCE
Refills: 0 | Status: DISCONTINUED | OUTPATIENT
Start: 2019-07-10 | End: 2019-07-13

## 2019-07-10 RX ORDER — SODIUM CHLORIDE 9 MG/ML
1000 INJECTION, SOLUTION INTRAVENOUS
Refills: 0 | Status: DISCONTINUED | OUTPATIENT
Start: 2019-07-10 | End: 2019-07-13

## 2019-07-10 RX ORDER — INSULIN LISPRO 100/ML
4 VIAL (ML) SUBCUTANEOUS
Qty: 0 | Refills: 0 | DISCHARGE

## 2019-07-10 RX ORDER — ATORVASTATIN CALCIUM 80 MG/1
80 TABLET, FILM COATED ORAL AT BEDTIME
Refills: 0 | Status: DISCONTINUED | OUTPATIENT
Start: 2019-07-10 | End: 2019-07-13

## 2019-07-10 RX ORDER — DEXTROSE 50 % IN WATER 50 %
25 SYRINGE (ML) INTRAVENOUS ONCE
Refills: 0 | Status: DISCONTINUED | OUTPATIENT
Start: 2019-07-10 | End: 2019-07-13

## 2019-07-10 RX ORDER — GLUCAGON INJECTION, SOLUTION 0.5 MG/.1ML
1 INJECTION, SOLUTION SUBCUTANEOUS ONCE
Refills: 0 | Status: DISCONTINUED | OUTPATIENT
Start: 2019-07-10 | End: 2019-07-13

## 2019-07-10 RX ORDER — ATENOLOL 25 MG/1
50 TABLET ORAL DAILY
Refills: 0 | Status: DISCONTINUED | OUTPATIENT
Start: 2019-07-10 | End: 2019-07-13

## 2019-07-10 RX ORDER — INSULIN LISPRO 100/ML
4 VIAL (ML) SUBCUTANEOUS
Refills: 0 | Status: DISCONTINUED | OUTPATIENT
Start: 2019-07-10 | End: 2019-07-10

## 2019-07-10 RX ORDER — MAGNESIUM SULFATE 500 MG/ML
2 VIAL (ML) INJECTION ONCE
Refills: 0 | Status: COMPLETED | OUTPATIENT
Start: 2019-07-10 | End: 2019-07-10

## 2019-07-10 RX ORDER — ALBUTEROL 90 UG/1
2 AEROSOL, METERED ORAL EVERY 6 HOURS
Refills: 0 | Status: DISCONTINUED | OUTPATIENT
Start: 2019-07-10 | End: 2019-07-13

## 2019-07-10 RX ORDER — PANTOPRAZOLE SODIUM 20 MG/1
8 TABLET, DELAYED RELEASE ORAL
Qty: 80 | Refills: 0 | Status: DISCONTINUED | OUTPATIENT
Start: 2019-07-10 | End: 2019-07-12

## 2019-07-10 RX ORDER — INSULIN LISPRO 100/ML
VIAL (ML) SUBCUTANEOUS
Refills: 0 | Status: DISCONTINUED | OUTPATIENT
Start: 2019-07-10 | End: 2019-07-13

## 2019-07-10 RX ORDER — SODIUM CHLORIDE 9 MG/ML
1000 INJECTION INTRAMUSCULAR; INTRAVENOUS; SUBCUTANEOUS
Refills: 0 | Status: DISCONTINUED | OUTPATIENT
Start: 2019-07-10 | End: 2019-07-10

## 2019-07-10 RX ORDER — AMLODIPINE BESYLATE 2.5 MG/1
10 TABLET ORAL DAILY
Refills: 0 | Status: DISCONTINUED | OUTPATIENT
Start: 2019-07-10 | End: 2019-07-13

## 2019-07-10 RX ORDER — DONEPEZIL HYDROCHLORIDE 10 MG/1
10 TABLET, FILM COATED ORAL AT BEDTIME
Refills: 0 | Status: DISCONTINUED | OUTPATIENT
Start: 2019-07-10 | End: 2019-07-13

## 2019-07-10 RX ORDER — ATENOLOL 25 MG/1
1 TABLET ORAL
Qty: 0 | Refills: 0 | DISCHARGE

## 2019-07-10 RX ORDER — ENOXAPARIN SODIUM 100 MG/ML
32 INJECTION SUBCUTANEOUS
Qty: 0 | Refills: 0 | DISCHARGE

## 2019-07-10 RX ADMIN — Medication 50 GRAM(S): at 20:33

## 2019-07-10 RX ADMIN — PANTOPRAZOLE SODIUM 10 MG/HR: 20 TABLET, DELAYED RELEASE ORAL at 10:31

## 2019-07-10 RX ADMIN — Medication 3: at 14:00

## 2019-07-10 RX ADMIN — SERTRALINE 100 MILLIGRAM(S): 25 TABLET, FILM COATED ORAL at 18:44

## 2019-07-10 RX ADMIN — LISINOPRIL 40 MILLIGRAM(S): 2.5 TABLET ORAL at 18:44

## 2019-07-10 RX ADMIN — AMLODIPINE BESYLATE 10 MILLIGRAM(S): 2.5 TABLET ORAL at 18:44

## 2019-07-10 RX ADMIN — INSULIN GLARGINE 20 UNIT(S): 100 INJECTION, SOLUTION SUBCUTANEOUS at 22:08

## 2019-07-10 RX ADMIN — SODIUM CHLORIDE 1000 MILLILITER(S): 9 INJECTION INTRAMUSCULAR; INTRAVENOUS; SUBCUTANEOUS at 05:11

## 2019-07-10 RX ADMIN — INSULIN GLARGINE 20 UNIT(S): 100 INJECTION, SOLUTION SUBCUTANEOUS at 06:10

## 2019-07-10 RX ADMIN — SODIUM CHLORIDE 1000 MILLILITER(S): 9 INJECTION INTRAMUSCULAR; INTRAVENOUS; SUBCUTANEOUS at 02:20

## 2019-07-10 RX ADMIN — ATORVASTATIN CALCIUM 80 MILLIGRAM(S): 80 TABLET, FILM COATED ORAL at 22:08

## 2019-07-10 RX ADMIN — PANTOPRAZOLE SODIUM 40 MILLIGRAM(S): 20 TABLET, DELAYED RELEASE ORAL at 07:55

## 2019-07-10 RX ADMIN — DONEPEZIL HYDROCHLORIDE 10 MILLIGRAM(S): 10 TABLET, FILM COATED ORAL at 22:08

## 2019-07-10 RX ADMIN — PANTOPRAZOLE SODIUM 10 MG/HR: 20 TABLET, DELAYED RELEASE ORAL at 22:09

## 2019-07-10 RX ADMIN — PANTOPRAZOLE SODIUM 10 MG/HR: 20 TABLET, DELAYED RELEASE ORAL at 18:45

## 2019-07-10 NOTE — H&P ADULT - RS GEN PE MLT RESP DETAILS PC
respirations non-labored/clear to auscultation bilaterally/good air movement/normal/airway patent/no chest wall tenderness/breath sounds equal respirations non-labored/normal/good air movement/breath sounds equal/no chest wall tenderness/clear to auscultation bilaterally/no rales/no rhonchi/no wheezes/airway patent

## 2019-07-10 NOTE — ED PROVIDER NOTE - PROGRESS NOTE DETAILS
Hgb low at 6.8, will transfuse. Consented pt for blood via daughter. RN who speak with Kinyarwanda also explained to patient who agreed. Rectal exam done for Hgb drop which was attributed to dental procedure. Stool dark on glove. Guaic sent. Will give protonix. Will consider platelets for plavix reversal if positive. IESHA. Guaiac positive. Will start protonix ggt. Reverse plavix (stroke ppx) with platelets. d/w pt's daughter via phone. Updated given. Consult emailed to GI service. IESHA. Consented pt for blood via daughter as examiner attempted to speak with the patient via language line  and patient was not able to follow consent due to literacy/hearing limitations. Rectal exam done for Hgb drop which was attributed to dental procedure. Stool dark on glove. Guaiac sent. Will give protonix. Will consider platelets for plavix reversal if positive. IESHA. Guaiac positive. Will start Protonix ggt. Reverse Plavix (stroke ppx) with platelets. d/w pt's daughter, Miranda, via phone. Updated given. No h/o GIB. ?UGIB vs swallowed blood from dental procedure. Consult emailed to GI service. IESAH.

## 2019-07-10 NOTE — H&P ADULT - PROBLEM SELECTOR PLAN 1
Orthostatic vitals positive, given 2L NS   Recheck orthostatic vitals after 2u PRBCs and 1u platelets  Will get echocardiogram  Serial EKGs Tele monitoring  Orthostatic vitals positive, given 2L NS   Recheck orthostatic vitals after 2u PRBCs and 1u platelets  Will get echocardiogram Near syncope in the setting of GI bleed versus severe orthostasis  Tele monitoring  Orthostatic vitals positive, given 2L NS   Recheck orthostatic vitals after 2u PRBCs and 1u platelets  Will continue hydration with NS at 75 cc/hr  Will get echocardiogram  Fall precautions, ambulate with assistance  PT eval ordered Near syncope in the setting of GI bleed versus severe orthostasis  Tele monitoring  Orthostatic vitals positive, given 2L NS   Recheck orthostatic vitals after 2u PRBCs and 1u platelets  Will get echocardiogram  Fall precautions, ambulate with assistance  PT eval ordered

## 2019-07-10 NOTE — H&P ADULT - ATTENDING COMMENTS
68 year old Iraqi speaking female with PMHx of CVA (with left sided residual weakness), HTN, HLD, T2DM, asthma, depression, and dementia presenting with weakness, dizziness after bleeding from dental procedure. Patient examined with daughter at bedside who provided translation at patient request. Patient had four teeth removed yesterday while still taking ASA and plavix and had profuse bleeding. Daughter states patient had blood soaked paper towels with large clots when she arrived to patient's apt this AM, and patient was down on the floor, very weak and barely talking. Denies fevers, chills, chest pain, SOB. Denies hx of GIB, denies smoking or NSAID use.   Exam: Afebrile, HR 70s, BP 17s/50s 68 year old Croatian speaking female with PMHx of CVA (with left sided residual weakness), HTN, HLD, T2DM, asthma, depression, and dementia presenting with weakness, dizziness after bleeding from dental procedure. Patient examined with daughter at bedside who provided translation at patient request. Patient had four teeth removed yesterday while still taking ASA and plavix and had profuse bleeding. Daughter states patient had blood soaked paper towels with large clots when she arrived to patient's apt this AM, and patient was down on the floor, very weak and barely talking. Denies fevers, chills, chest pain, SOB. Denies hx of GIB, denies smoking or NSAID use.     Exam: Afebrile, HR 70s, BP 170s/50s, saturating well on room air.   Gen: NAD, sleeping comfortable  Lungs: CTAB, no wheeze/rales  CV: RRR, S1/S2  Abd: obese, soft, non-tender   MSK: +1 LE edema bilaterally to mid-calf  Labs notable for anemia from 9.2 --> 6.8, elevated glucose, Mg of 1.3 and elevated Alk Phos     A/P: 68F with above hx presenting with symptomatic anemia 2/2 acute blood loss. Unclear if patient is having blood loss just from dental procedure and swallowed blood, leading to melena or also with superimposed GIB. Currently receiving 2U PRBCs and 1U plts, and on protonix gtt. Trend CBC after blood transfusion and q8h. Will keep patient on clears and NPO after midnight in case of planned GI procedure. Holding ASA and plavix due to bleeding, SCDs for DVT ppx. Restarted on home long-acting insulin and SSI for elevated FS.

## 2019-07-10 NOTE — H&P ADULT - ASSESSMENT
68 year old Indian speaking female with PMHx of CVA (left sided residual weakness), DM Type II, HTN, HLD, depression, dementia, presents to the ED for evaluation of weakness x 1 day with anemia likely secondary to blood loss in the setting of dental procedure vs GI bleed.

## 2019-07-10 NOTE — ED ADULT NURSE REASSESSMENT NOTE - NS ED NURSE REASSESS COMMENT FT1
Pt laying in stretcher, resps even/unlabored b/l. Pt with no complaints, NAD at this time. Blood transfusion consent signed and in chart. Awaiting T&S results for PRBCs. Awaiting dispo. Will continue to monitor.

## 2019-07-10 NOTE — H&P ADULT - NEGATIVE CARDIOVASCULAR SYMPTOMS
no paroxysmal nocturnal dyspnea/no chest pain/no palpitations/no orthopnea/no peripheral edema/no dyspnea on exertion

## 2019-07-10 NOTE — ED ADULT NURSE NOTE - OBJECTIVE STATEMENT
Received pt in spot 1. AA0X3, Green[place]ic speaking with daughter at bedside. S/p 4 teeth being extracted today. Pt is on plavix and was not told to stop prior to procedure as per pt. Pt endorses significant bleeding post procedure and when daughter came over pt was on floor. Pt unsure if she hit head. Denies LOC. Pt with near syncopal episode while in ER bathroom, became very dizzy. Received pt in spot 1. AA0X3, [place] speaking with daughter at bedside. S/p 4 teeth being extracted today. Pt is on plavix and was not told to stop prior to procedure as per pt. Pt endorses significant bleeding post procedure and when daughter came over pt was on floor. Pt unsure if she hit head. Denies LOC. Pt with near syncopal episode while in ER bathroom, became very dizzy. Placed on cardiac monitor, NSR. Dr. Sarah made aware. Unable to obtain IV access. Primary RN Bertha made aware. Will continue to monitor.

## 2019-07-10 NOTE — ED ADULT NURSE NOTE - NSIMPLEMENTINTERV_GEN_ALL_ED
Implemented All Fall with Harm Risk Interventions:  [place] to call system. Call bell, personal items and telephone within reach. Instruct patient to call for assistance. Room bathroom lighting operational. Non-slip footwear when patient is off stretcher. Physically safe environment: no spills, clutter or unnecessary equipment. Stretcher in lowest position, wheels locked, appropriate side rails in place. Provide visual cue, wrist band, yellow gown, etc. Monitor gait and stability. Monitor for mental status changes and reorient to person, place, and time. Review medications for side effects contributing to fall risk. Reinforce activity limits and safety measures with patient and family. Provide visual clues: red socks.

## 2019-07-10 NOTE — PATIENT PROFILE ADULT - DO YOU FEEL UNSAFE AT SCHOOL?
none cough fro last few days adenotonsillar hypertrophy circumcised at birth w/o bleeding not applicable cough for last few days

## 2019-07-10 NOTE — H&P ADULT - NSICDXFAMILYHX_GEN_ALL_CORE_FT
FAMILY HISTORY:  Family history of diabetes mellitus (DM)    Sibling  Still living? Unknown  Family history of stroke (cerebrovascular), Age at diagnosis: Age Unknown

## 2019-07-10 NOTE — H&P ADULT - NSICDXPASTMEDICALHX_GEN_ALL_CORE_FT
PAST MEDICAL HISTORY:  Asthma     CVA (cerebral vascular accident) L side residual weakness    Depression     DM (diabetes mellitus)     HLD (hyperlipidemia)     HTN (hypertension)     Mild dementia

## 2019-07-10 NOTE — CONSULT NOTE ADULT - ASSESSMENT
Impression:    1. Melena, drop in hemoglobin to 6.8 previously this had been 12 in 5/18 Hemoglobin was 9 on admission. She likely swallowed a lot of blood during her dental procedure causing melena. She is ordered for 2 U RBC.    2. Thrombocytopenia, mildly elevated alk phos: no stigmata of cirrhosis on exam however patient has been told her liver is inflamed.    3. History of CVA on aspirin and plavix    4. Hyperglycemia    Recommendation:  -monitor for GI bleeding, follow up post-transfusion CBC  -check an ultrasound of the abdomen to rule out cirrhosis  -would not plan for endoscopy unless continued melena. Impression:    1. Melena, drop in hemoglobin to 6.8 previously this had been 12 in 5/18 Hemoglobin was 9 on admission. She likely swallowed a lot of blood during her dental procedure causing melena. She is ordered for 2 U RBC. The differential diagnosis also includes a peptic ucler, doubt portal hypertensive etiology given no known history of cirrhosis.    2. Thrombocytopenia, mildly elevated alk phos: no stigmata of cirrhosis on exam however patient has been told her liver is inflamed.    3. History of CVA on aspirin and plavix    4. Hyperglycemia    Recommendation:  -monitor for GI bleeding, follow up post-transfusion CBC  -check an ultrasound of the abdomen to rule out cirrhosis  -possible EGD today or tomorrow Impression:    1. Melena, drop in hemoglobin to 6.8 previously this had been 12 in 5/18 Hemoglobin was 9 on admission. She likely swallowed a lot of blood during her dental procedure causing melena. She is ordered for 2 U RBC. The differential diagnosis also includes a peptic ulcer with hemorrhage, doubt portal hypertensive etiology (thrombocytopenia) given no known history of cirrhosis.    2. Thrombocytopenia, mildly elevated alk phos: no stigmata of cirrhosis on exam however patient has been told her liver is inflamed.    3. History of CVA on aspirin and plavix    4. Hyperglycemia    Recommendation:  -monitor for GI bleeding, follow up post-transfusion CBC  -check an ultrasound of the abdomen to rule out cirrhosis  -possible EGD today or tomorrow

## 2019-07-10 NOTE — H&P ADULT - NEGATIVE GASTROINTESTINAL SYMPTOMS
no abdominal pain/no vomiting/no constipation/no nausea no constipation/no abdominal pain/no nausea/no vomiting/no hematochezia

## 2019-07-10 NOTE — ED ADULT NURSE NOTE - CHIEF COMPLAINT QUOTE
pt arrives s/p syncopal episode. pt states she slid off the bed denies hitting her head. pt had 4 teeth extracted today on Plavix actively bleeding from mouth . pt arrives w/ 20 right hand by EMS with NS infusing. pt denies any pain. pmh CVA, DM, HTN ekg in progress

## 2019-07-10 NOTE — CONSULT NOTE ADULT - SUBJECTIVE AND OBJECTIVE BOX
Chief Complaint:  Patient is a 68y old  Female who presents with a chief complaint of Weakness (10 Jul 2019 09:40)      HPI:  This is a 68 year old female with HTN HLD DM2 hx of CVA on aspirin and plavix who presented with presyncope. She had 4 teeth extracted yesterday and was not told to hold her medicines before the procedure. SHe states she bled a lot and she mostly spit it out. She endorses melena. She denies dysphagia, dyspepsia nausea or vomiting or prior history of GI bleeding. She had endoscopy and colonoscopy many years ago. She denies NSAID use. She was told her liver is a little inflammed but never was diagnosed with cirrhosis.    Allergies:  No Known Allergies      Home Medications:    Hospital Medications:  ALBUTerol    90 MICROgram(s) HFA Inhaler 2 Puff(s) Inhalation every 6 hours PRN  amLODIPine   Tablet 10 milliGRAM(s) Oral daily  ATENolol  Tablet 50 milliGRAM(s) Oral daily  atorvastatin 80 milliGRAM(s) Oral at bedtime  dextrose 40% Gel 15 Gram(s) Oral once PRN  dextrose 5%. 1000 milliLiter(s) IV Continuous <Continuous>  dextrose 50% Injectable 12.5 Gram(s) IV Push once  dextrose 50% Injectable 25 Gram(s) IV Push once  dextrose 50% Injectable 25 Gram(s) IV Push once  donepezil 10 milliGRAM(s) Oral at bedtime  glucagon  Injectable 1 milliGRAM(s) IntraMuscular once PRN  insulin glargine Injectable (LANTUS) 20 Unit(s) SubCutaneous at bedtime  insulin lispro (HumaLOG) corrective regimen sliding scale   SubCutaneous three times a day before meals  insulin lispro (HumaLOG) corrective regimen sliding scale   SubCutaneous at bedtime  insulin lispro Injectable (HumaLOG) 4 Unit(s) SubCutaneous three times a day before meals  lisinopril 40 milliGRAM(s) Oral daily  magnesium sulfate  IVPB 2 Gram(s) IV Intermittent once  pantoprazole Infusion 8 mG/Hr IV Continuous <Continuous>  sertraline 100 milliGRAM(s) Oral daily  sodium chloride 0.9%. 1000 milliLiter(s) IV Continuous <Continuous>      PMHX/PSHX:  Asthma  HLD (hyperlipidemia)  CVA (cerebral vascular accident)  Depression  Mild dementia  DM (diabetes mellitus)  HTN (hypertension)  No pertinent past medical history  S/P appendectomy  History of colonic diverticulitis  S/P  section  S/P cholecystectomy  No significant past surgical history      Family history:  Family history of diabetes mellitus (DM)  Family history of stroke (cerebrovascular) (Sibling)      Social History:     ROS:     General:  No wt loss, fevers, chills, night sweats, fatigue,   Eyes:  Good vision, no reported pain  ENT:  No sore throat, pain, runny nose, dysphagia  CV:  No pain, palpitations, hypo/hypertension  Resp:  No dyspnea, cough, tachypnea, wheezing  GI:  See HPI  :  No pain, bleeding, incontinence, nocturia  Muscle:  No pain, weakness  Neuro:  No weakness, tingling, memory problems  Psych:  No fatigue, insomnia, mood problems, depression  Endocrine:  No polyuria, polydipsia, cold/heat intolerance  Heme:  No petechiae, ecchymosis, easy bruisability  Skin:  No rash, edema      PHYSICAL EXAM:     GENERAL:  Appears stated age, well-groomed, well-nourished, no distress  HEENT:  NC/AT,  conjunctivae clear and pink,  no JVD, blood around the nares  CHEST:  Full & symmetric excursion, no increased effort, breath sounds clear  HEART:  Regular rhythm, S1, S2, no murmur/rub/S3/S4, no abdominal bruit, no edema  ABDOMEN:  Soft, non-tender, non-distended, normoactive bowel sounds,  no masses , no hepatosplenomegaly  EXTREMITIES:  no cyanosis,clubbing or edema  SKIN:  No rash/erythema/ecchymoses/petechiae/wounds/abscess/warm/dry  NEURO:  Alert, oriented    Vital Signs:  Vital Signs Last 24 Hrs  T(C): 37.1 (10 Jul 2019 10:00), Max: 37.1 (10 Jul 2019 10:00)  T(F): 98.7 (10 Jul 2019 10:00), Max: 98.7 (10 Jul 2019 10:00)  HR: 64 (10 Jul 2019 10:00) (53 - 64)  BP: 170/51 (10 Jul 2019 10:00) (122/53 - 177/50)  BP(mean): --  RR: 15 (10 Jul 2019 10:00) (15 - 17)  SpO2: 97% (10 Jul 2019 10:00) (95% - 98%)  Daily Height in cm: 144.78 (10 Jul 2019 09:40)    Daily     LABS:                        6.8    6.43  )-----------( 111      ( 10 Jul 2019 04:51 )             20.9     07-10    138  |  102  |  31<H>  ----------------------------<  341<H>  4.7   |  25  |  0.81    Ca    8.1<L>      10 Jul 2019 06:29  Phos  4.7     07-10  Mg     1.3     07-10    TPro  5.3<L>  /  Alb  3.0<L>  /  TBili  0.4  /  DBili  x   /  AST  25  /  ALT  20  /  AlkPhos  138<H>  0710    LIVER FUNCTIONS - ( 10 Jul 2019 06:29 )  Alb: 3.0 g/dL / Pro: 5.3 g/dL / ALK PHOS: 138 u/L / ALT: 20 u/L / AST: 25 u/L / GGT: x                   Imaging:

## 2019-07-10 NOTE — ED PROVIDER NOTE - OBJECTIVE STATEMENT
69yo F with hx of CVA (L residual weakness) presents with generalized weakness. Had 4 teeth removed from L side today. Takes plavix after stroke 4/2018, still on plavix. 69yo F with hx of CVA (L residual weakness) presents with generalized weakness. Had 4 teeth removed from L side today. Takes plavix after stroke 4/2018, still on plavix.    Daughter (Miranda): [phone] 67yo F with hx of CVA (L residual weakness) presents with generalized weakness. Had 4 teeth removed from L side today. Takes plavix after stroke 4/2018, still on plavix. Was getting gup from sitting, felt very dizzy and almost fell, slipped slowly alongside her bed.     Daughter (Miranda): [phone]

## 2019-07-10 NOTE — H&P ADULT - PROBLEM SELECTOR PLAN 3
Patient receiving 2u PRBCs and 1u platelets  Hold ASA and Plavix  Recheck H/H Patient receiving 2u PRBCs and 1u platelets  Hold ASA and Plavix  Recheck H/H  Anemia panel ordered for AM

## 2019-07-10 NOTE — H&P ADULT - PSYCHIATRIC COMMENTS
Hx of depression with SA in 1989 after the passing of her mother, treated by a Psychiatrist at that time.  Also reports "forgetfulness" for which her doctor prescribed her medication.;

## 2019-07-10 NOTE — H&P ADULT - PROBLEM SELECTOR PLAN 2
Patient receiving 2u PRBCs and 1u platelets  Will continue Protonix  Hold ASA and Plavix  Will get endoscopy  Clear liquid diet Patient receiving 2u PRBCs and 1u platelets  Occult positive  Will continue Protonix gtt  Hold ASA and Plavix  GI consult called for possible endoscopy and colonoscopy  Clear liquid diet Patient receiving 2u PRBCs and 1u platelets  Occult positive  Will continue Protonix gtt  Hold ASA and Plavix  GI consult called for possible endoscopy and colonoscopy  Clear liquid diet and NPO after midnight

## 2019-07-10 NOTE — H&P ADULT - NSICDXPASTSURGICALHX_GEN_ALL_CORE_FT
PAST SURGICAL HISTORY:  History of colonic diverticulitis s/p surgery    S/P appendectomy     S/P  section x3    S/P cholecystectomy

## 2019-07-10 NOTE — H&P ADULT - HISTORY OF PRESENT ILLNESS
68 year old Northern Irish speaking female with PMHx of CVA (left sided residual weakness), DM Type II, HTN, HLD, depression, dementia, presents to the ED for evaluation of weakness x 1 day.  Patient states she had 4 teeth removed yesterday at 2pm and continued to bleed from the site into the evening.  States last night she was walking at home and had an episode of generalized weakness and lowered herself to the ground.  Denies LOC, head hit, other injuries.  No active bleeding currently.  Patient reports one episode of dark, watery diarrhea last night.  Otherwise denies BRBPR, hematuria, hematochezia, abdominal pain, fever, chills.  Patient states last week she had a headache for ~5 days, went to the hospital and was given IV fluids and medication which resolved the headache.  Patient does have a Hx of migraines and states this episode felt similar.  Denies other recent illness or recent travel.  Patient reports one prior episode of anemia s/p surgery for diverticulitis and had a blood transfusion at that time. 68 year old Latvian speaking female with a PMHx of CVA (with left sided residual weakness), HTN, HLD, DM, asthma, depression, and dementia presents to the ED for evaluation of weakness x 1 day.  Patient states she had 4 teeth removed yesterday at 2pm and continued to bleed from the site into the evening.  States last night she was walking at home and had an episode of generalized weakness and lowered herself to the ground.  Denies LOC, head trauma, or other bodily injuries.  No active bleeding currently.  Patient reports one episode of dark, watery diarrhea last night.  Otherwise denies BRBPR, hematuria, hematochezia, abdominal pain, fever, chills.  Patient states last week she had a headache for ~5 days, went to the hospital and was given IV fluids and medication which resolved the headache.  Patient does have a Hx of migraines and states this episode felt similar.  Denies other recent illness or recent travel.  Patient reports one prior episode of anemia s/p surgery for diverticulitis and had a blood transfusion at that time.    H&P done by laureen Dunbar. I have reviewed and edited it where appropriate.

## 2019-07-10 NOTE — H&P ADULT - PROBLEM SELECTOR PLAN 7
Monitor BP  Continue Lisinopril, Atenolol Monitor BP  Continue Lisinopril, Atenolol, Amlodipine  Low salt diet

## 2019-07-10 NOTE — ED PROVIDER NOTE - NS ED ROS FT
ROS: denies HA, fevers/chills, nausea/vomiting, chest pain, SOB, diaphoresis, abdominal pain, diarrhea, joint pain, neuro deficits, dysuria/hematuria, rash    +weakness, dizziness

## 2019-07-10 NOTE — ED PROVIDER NOTE - ATTENDING CONTRIBUTION TO CARE
Afebrile. Awake and Alert. Lungs CTA. Heart RRR. Abdomen soft NTND. Neurologic exam: A&O x3, speech clear, CADE, CN II-XII intact, motor strength +5/5 LUE ( +4)/LLE, +5/5 RUE/RLE. FALKOWSKA.    68F p/w near-syncope. No CP, SOB, palpitations. s/p dental extraction today.  EKG r/o arrythmia  Check CBC given blood loss today, no active bleeding  CTH given on Plavix with slip unto ground

## 2019-07-10 NOTE — H&P ADULT - NSHPLABSRESULTS_GEN_ALL_CORE
6.8    6.43  )-----------( 111      ( 10 Jul 2019 04:51 )             20.9     07-10    138  |  102  |  31<H>  ----------------------------<  341<H>  4.7   |  25  |  0.81    Ca    8.1<L>      10 Jul 2019 06:29  Phos  4.7     07-10  Mg     1.3     07-10    TPro  5.3<L>  /  Alb  3.0<L>  /  TBili  0.4  /  DBili  x   /  AST  25  /  ALT  20  /  AlkPhos  138<H>  07-10    EKG: Sinus bradycardia @56bpm.    CT head: No acute intracranial hemorrhage, mass effect or midline shift. Mild chronic microvascular ischemic gliotic changes.  No displaced calvarial fracture or significant scalp hematoma.    CXR: Clear lungs. 6.8    6.43  )-----------( 111      ( 10 Jul 2019 04:51 )             20.9     07-10    138  |  102  |  31<H>  ----------------------------<  341<H>  4.7   |  25  |  0.81    Ca    8.1<L>      10 Jul 2019 06:29  Phos  4.7     07-10  Mg     1.3     07-10    TPro  5.3<L>  /  Alb  3.0<L>  /  TBili  0.4  /  DBili  x   /  AST  25  /  ALT  20  /  AlkPhos  138<H>  07-10    EKG: read by me, Sinus bradycardia @56bpm.    CT head: No acute intracranial hemorrhage, mass effect or midline shift. Mild chronic microvascular ischemic gliotic changes.  No displaced calvarial fracture or significant scalp hematoma.    CXR: Clear lungs.

## 2019-07-10 NOTE — ED ADULT NURSE REASSESSMENT NOTE - NS ED NURSE REASSESS COMMENT FT1
VSS, pt denies pain/complaints, pt has received 1unit PRBC, and 1 PLT as ordered, no s/s of transfusion reaction, pt has been resting comfortably, dtr at bedside, as per Tele PA, repeat labs to be drawn after second unit of PRBC.

## 2019-07-10 NOTE — ED PROVIDER NOTE - PHYSICAL EXAMINATION
Gen: NAD  Head: NCAT  HEENT: PERRL, MMM, normal conjunctiva, anicteric, neck supple; 4 teeth extracted with no active bleeding  Lung: CTAB, no adventitious sounds  CV: RRR, no murmurs, rubs or gallops  Abd: soft, NTND, no rebound or guarding, no CVAT  MSK: No edema, no visible deformities  Neuro: No focal neurologic deficits. CN II-XII grossly intact. 5/5 strength and normal sensation in all extremities.  Skin: Warm and dry, no evidence of rash  Psych: normal mood and affect

## 2019-07-10 NOTE — H&P ADULT - PROBLEM SELECTOR PLAN 5
HgA1C is 8.4  Glucose monitoring  Will hold Metformin  Continue Lantus, Humalog  Insulin sliding scale HgA1C is 8.4  Glucose monitoring  Continue Lantus and Humalog with insulin sliding scale for coverage  Will consider endo consult to optimize glycemic control

## 2019-07-10 NOTE — H&P ADULT - NSHPPHYSICALEXAM_GEN_ALL_CORE
Vital Signs Last 24 Hrs  T(C): 36.6 (10 Jul 2019 13:11), Max: 37.1 (10 Jul 2019 10:00)  T(F): 97.8 (10 Jul 2019 13:11), Max: 98.7 (10 Jul 2019 10:00)  HR: 64 (10 Jul 2019 13:11) (53 - 64)  BP: 170/54 (10 Jul 2019 13:11) (122/53 - 188/70)  BP(mean): --  RR: 14 (10 Jul 2019 13:11) (12 - 17)  SpO2: 98% (10 Jul 2019 13:11) (95% - 98%)

## 2019-07-11 DIAGNOSIS — I63.9 CEREBRAL INFARCTION, UNSPECIFIED: ICD-10-CM

## 2019-07-11 LAB
ALBUMIN SERPL ELPH-MCNC: 3.3 G/DL — SIGNIFICANT CHANGE UP (ref 3.3–5)
ALP SERPL-CCNC: 134 U/L — HIGH (ref 40–120)
ALT FLD-CCNC: 16 U/L — SIGNIFICANT CHANGE UP (ref 4–33)
ANION GAP SERPL CALC-SCNC: 11 MMO/L — SIGNIFICANT CHANGE UP (ref 7–14)
AST SERPL-CCNC: 20 U/L — SIGNIFICANT CHANGE UP (ref 4–32)
BASOPHILS # BLD AUTO: 0.06 K/UL — SIGNIFICANT CHANGE UP (ref 0–0.2)
BASOPHILS NFR BLD AUTO: 0.7 % — SIGNIFICANT CHANGE UP (ref 0–2)
BILIRUB SERPL-MCNC: 1.2 MG/DL — SIGNIFICANT CHANGE UP (ref 0.2–1.2)
BUN SERPL-MCNC: 15 MG/DL — SIGNIFICANT CHANGE UP (ref 7–23)
CALCIUM SERPL-MCNC: 8.8 MG/DL — SIGNIFICANT CHANGE UP (ref 8.4–10.5)
CHLORIDE SERPL-SCNC: 101 MMOL/L — SIGNIFICANT CHANGE UP (ref 98–107)
CHOLEST SERPL-MCNC: 106 MG/DL — LOW (ref 120–199)
CO2 SERPL-SCNC: 27 MMOL/L — SIGNIFICANT CHANGE UP (ref 22–31)
CREAT SERPL-MCNC: 0.74 MG/DL — SIGNIFICANT CHANGE UP (ref 0.5–1.3)
EOSINOPHIL # BLD AUTO: 0.82 K/UL — HIGH (ref 0–0.5)
EOSINOPHIL NFR BLD AUTO: 9.1 % — HIGH (ref 0–6)
FERRITIN SERPL-MCNC: 118.1 NG/ML — SIGNIFICANT CHANGE UP (ref 15–150)
FOLATE SERPL-MCNC: 19.6 NG/ML — SIGNIFICANT CHANGE UP (ref 4.7–20)
GLUCOSE BLDC GLUCOMTR-MCNC: 139 MG/DL — HIGH (ref 70–99)
GLUCOSE BLDC GLUCOMTR-MCNC: 159 MG/DL — HIGH (ref 70–99)
GLUCOSE SERPL-MCNC: 121 MG/DL — HIGH (ref 70–99)
HCT VFR BLD CALC: 30.8 % — LOW (ref 34.5–45)
HCT VFR BLD CALC: 32 % — LOW (ref 34.5–45)
HCV AB S/CO SERPL IA: 0.17 S/CO — SIGNIFICANT CHANGE UP (ref 0–0.99)
HCV AB SERPL-IMP: SIGNIFICANT CHANGE UP
HDLC SERPL-MCNC: 28 MG/DL — LOW (ref 45–65)
HGB BLD-MCNC: 10.3 G/DL — LOW (ref 11.5–15.5)
HGB BLD-MCNC: 10.7 G/DL — LOW (ref 11.5–15.5)
IMM GRANULOCYTES NFR BLD AUTO: 0.4 % — SIGNIFICANT CHANGE UP (ref 0–1.5)
INR BLD: 1.01 — SIGNIFICANT CHANGE UP (ref 0.88–1.17)
IRON SATN MFR SERPL: 167 UG/DL — HIGH (ref 30–160)
IRON SATN MFR SERPL: 244 UG/DL — SIGNIFICANT CHANGE UP (ref 140–530)
LIPID PNL WITH DIRECT LDL SERPL: 62 MG/DL — SIGNIFICANT CHANGE UP
LYMPHOCYTES # BLD AUTO: 1.69 K/UL — SIGNIFICANT CHANGE UP (ref 1–3.3)
LYMPHOCYTES # BLD AUTO: 18.7 % — SIGNIFICANT CHANGE UP (ref 13–44)
MAGNESIUM SERPL-MCNC: 1.5 MG/DL — LOW (ref 1.6–2.6)
MCHC RBC-ENTMCNC: 28.6 PG — SIGNIFICANT CHANGE UP (ref 27–34)
MCHC RBC-ENTMCNC: 28.8 PG — SIGNIFICANT CHANGE UP (ref 27–34)
MCHC RBC-ENTMCNC: 33.4 % — SIGNIFICANT CHANGE UP (ref 32–36)
MCHC RBC-ENTMCNC: 33.4 % — SIGNIFICANT CHANGE UP (ref 32–36)
MCV RBC AUTO: 85.6 FL — SIGNIFICANT CHANGE UP (ref 80–100)
MCV RBC AUTO: 86 FL — SIGNIFICANT CHANGE UP (ref 80–100)
MONOCYTES # BLD AUTO: 0.81 K/UL — SIGNIFICANT CHANGE UP (ref 0–0.9)
MONOCYTES NFR BLD AUTO: 8.9 % — SIGNIFICANT CHANGE UP (ref 2–14)
NEUTROPHILS # BLD AUTO: 5.64 K/UL — SIGNIFICANT CHANGE UP (ref 1.8–7.4)
NEUTROPHILS NFR BLD AUTO: 62.2 % — SIGNIFICANT CHANGE UP (ref 43–77)
NRBC # FLD: 0 K/UL — SIGNIFICANT CHANGE UP (ref 0–0)
NRBC # FLD: 0 K/UL — SIGNIFICANT CHANGE UP (ref 0–0)
PHOSPHATE SERPL-MCNC: 1.8 MG/DL — LOW (ref 2.5–4.5)
PLATELET # BLD AUTO: 141 K/UL — LOW (ref 150–400)
PLATELET # BLD AUTO: 144 K/UL — LOW (ref 150–400)
PMV BLD: 10.1 FL — SIGNIFICANT CHANGE UP (ref 7–13)
PMV BLD: 10.3 FL — SIGNIFICANT CHANGE UP (ref 7–13)
POTASSIUM SERPL-MCNC: 3.3 MMOL/L — LOW (ref 3.5–5.3)
POTASSIUM SERPL-SCNC: 3.3 MMOL/L — LOW (ref 3.5–5.3)
PROT SERPL-MCNC: 5.5 G/DL — LOW (ref 6–8.3)
PROTHROM AB SERPL-ACNC: 11.5 SEC — SIGNIFICANT CHANGE UP (ref 9.8–13.1)
RBC # BLD: 3.6 M/UL — LOW (ref 3.8–5.2)
RBC # BLD: 3.72 M/UL — LOW (ref 3.8–5.2)
RBC # FLD: 13.5 % — SIGNIFICANT CHANGE UP (ref 10.3–14.5)
RBC # FLD: 13.5 % — SIGNIFICANT CHANGE UP (ref 10.3–14.5)
RETICS #: 91 K/UL — SIGNIFICANT CHANGE UP (ref 25–125)
RETICS/RBC NFR: 2.5 % — SIGNIFICANT CHANGE UP (ref 0.5–2.5)
SODIUM SERPL-SCNC: 139 MMOL/L — SIGNIFICANT CHANGE UP (ref 135–145)
SPECIMEN SOURCE: SIGNIFICANT CHANGE UP
TRIGL SERPL-MCNC: 152 MG/DL — HIGH (ref 10–149)
TSH SERPL-MCNC: 4.29 UIU/ML — HIGH (ref 0.27–4.2)
UIBC SERPL-MCNC: 76.5 UG/DL — LOW (ref 110–370)
VIT B12 SERPL-MCNC: 605 PG/ML — SIGNIFICANT CHANGE UP (ref 200–900)
WBC # BLD: 9.06 K/UL — SIGNIFICANT CHANGE UP (ref 3.8–10.5)
WBC # BLD: 9.47 K/UL — SIGNIFICANT CHANGE UP (ref 3.8–10.5)
WBC # FLD AUTO: 9.06 K/UL — SIGNIFICANT CHANGE UP (ref 3.8–10.5)
WBC # FLD AUTO: 9.47 K/UL — SIGNIFICANT CHANGE UP (ref 3.8–10.5)

## 2019-07-11 PROCEDURE — 99233 SBSQ HOSP IP/OBS HIGH 50: CPT

## 2019-07-11 PROCEDURE — 88305 TISSUE EXAM BY PATHOLOGIST: CPT | Mod: 26

## 2019-07-11 PROCEDURE — 76705 ECHO EXAM OF ABDOMEN: CPT | Mod: 26

## 2019-07-11 PROCEDURE — 43239 EGD BIOPSY SINGLE/MULTIPLE: CPT | Mod: GC

## 2019-07-11 PROCEDURE — 93970 EXTREMITY STUDY: CPT | Mod: 26

## 2019-07-11 PROCEDURE — 88312 SPECIAL STAINS GROUP 1: CPT | Mod: 26

## 2019-07-11 RX ORDER — POTASSIUM CHLORIDE 20 MEQ
40 PACKET (EA) ORAL ONCE
Refills: 0 | Status: COMPLETED | OUTPATIENT
Start: 2019-07-11 | End: 2019-07-11

## 2019-07-11 RX ORDER — POTASSIUM PHOSPHATE, MONOBASIC POTASSIUM PHOSPHATE, DIBASIC 236; 224 MG/ML; MG/ML
15 INJECTION, SOLUTION INTRAVENOUS ONCE
Refills: 0 | Status: COMPLETED | OUTPATIENT
Start: 2019-07-11 | End: 2019-07-11

## 2019-07-11 RX ORDER — SODIUM CHLORIDE 9 MG/ML
1000 INJECTION, SOLUTION INTRAVENOUS
Refills: 0 | Status: DISCONTINUED | OUTPATIENT
Start: 2019-07-11 | End: 2019-07-12

## 2019-07-11 RX ORDER — MAGNESIUM SULFATE 500 MG/ML
1 VIAL (ML) INJECTION ONCE
Refills: 0 | Status: COMPLETED | OUTPATIENT
Start: 2019-07-11 | End: 2019-07-11

## 2019-07-11 RX ADMIN — Medication 40 MILLIEQUIVALENT(S): at 15:03

## 2019-07-11 RX ADMIN — LISINOPRIL 40 MILLIGRAM(S): 2.5 TABLET ORAL at 06:31

## 2019-07-11 RX ADMIN — INSULIN GLARGINE 20 UNIT(S): 100 INJECTION, SOLUTION SUBCUTANEOUS at 22:17

## 2019-07-11 RX ADMIN — AMLODIPINE BESYLATE 10 MILLIGRAM(S): 2.5 TABLET ORAL at 06:31

## 2019-07-11 RX ADMIN — SODIUM CHLORIDE 30 MILLILITER(S): 9 INJECTION, SOLUTION INTRAVENOUS at 10:16

## 2019-07-11 RX ADMIN — ATENOLOL 50 MILLIGRAM(S): 25 TABLET ORAL at 06:31

## 2019-07-11 RX ADMIN — POTASSIUM PHOSPHATE, MONOBASIC POTASSIUM PHOSPHATE, DIBASIC 62.5 MILLIMOLE(S): 236; 224 INJECTION, SOLUTION INTRAVENOUS at 16:50

## 2019-07-11 RX ADMIN — Medication 100 GRAM(S): at 15:02

## 2019-07-11 RX ADMIN — DONEPEZIL HYDROCHLORIDE 10 MILLIGRAM(S): 10 TABLET, FILM COATED ORAL at 22:17

## 2019-07-11 RX ADMIN — ATORVASTATIN CALCIUM 80 MILLIGRAM(S): 80 TABLET, FILM COATED ORAL at 22:17

## 2019-07-11 RX ADMIN — SERTRALINE 100 MILLIGRAM(S): 25 TABLET, FILM COATED ORAL at 15:16

## 2019-07-11 NOTE — PROGRESS NOTE ADULT - PROBLEM SELECTOR PLAN 3
s/p 2u PRBCs and 1u platelets, Occult positive  s/p EGD - , no stigmata bleeding, change to PPI qd  Hold ASA and Plavix Near syncope in the setting of blood loss, Tele monitoring, echocardiogram  Fall precautions, ambulate with assistance  PT eval ordered

## 2019-07-11 NOTE — PROGRESS NOTE ADULT - PROBLEM SELECTOR PLAN 7
Check fasting lipid panel  Continue Atorvastatin Monitor BP  Continue Lisinopril, Atenolol, Amlodipine  Low salt diet

## 2019-07-11 NOTE — PROGRESS NOTE ADULT - ASSESSMENT
68 year old Macedonian speaking female with PMHx of CVA (left sided residual weakness), DM Type II, HTN, HLD, depression, dementia, presents to the ED for evaluation of weakness x 1 day with anemia likely secondary to blood loss in the setting of dental procedure vs GI bleed.

## 2019-07-11 NOTE — PHYSICAL THERAPY INITIAL EVALUATION ADULT - PERTINENT HX OF CURRENT PROBLEM, REHAB EVAL
68 year old [place] speaking female with a PMHx of CVA (with left sided residual weakness), HTN, HLD, DM, asthma, depression, and dementia presents to the ED for evaluation of weakness x 1 day.

## 2019-07-11 NOTE — PRE-OP CHECKLIST - BMI (KG/M2)
Preliminary EEG Report    EEG reviewed from 7/18/17 @ 11:11 until 12:53. No seizures. Formal report to follow in the morning.     Daisy Cartwright MD ([place])  Neurology/Epilepsy  Pager [phone]       36.7

## 2019-07-11 NOTE — PROGRESS NOTE ADULT - PROBLEM SELECTOR PLAN 1
Near syncope in the setting of blood loss, Tele monitoring, echocardiogram  Fall precautions, ambulate with assistance  PT eval ordered Patient receiving 2u PRBCs and 1u platelets - responded well  seems bleeding was from dental procedure, EGD  no stigmata recent bleeding  ASA and Plavix held

## 2019-07-11 NOTE — PROGRESS NOTE ADULT - PROBLEM SELECTOR PLAN 2
Patient receiving 2u PRBCs and 1u platelets - responded well  seems bleeding was from dental procedure, EGD  no stigmata recent bleeding  ASA and Plavix held s/p 2u PRBCs and 1u platelets, Occult positive  s/p EGD - , no stigmata bleeding, change to PPI qd  Hold ASA and Plavix

## 2019-07-11 NOTE — PROGRESS NOTE ADULT - PROBLEM SELECTOR PLAN 5
US doppler of bilateral LEs negative HgA1C is 8.4, FSBGs  Continue Lantus and Humalog with insulin sliding scale for coverage

## 2019-07-11 NOTE — PROGRESS NOTE ADULT - PROBLEM SELECTOR PLAN 6
HgA1C is 8.4, FSBGs  Continue Lantus and Humalog with insulin sliding scale for coverage Check fasting lipid panel  Continue Atorvastatin

## 2019-07-11 NOTE — PROGRESS NOTE ADULT - SUBJECTIVE AND OBJECTIVE BOX
Patient is a 68y old  Female who presents with a chief complaint of Weakness (10 Jul 2019 11:47)      SUBJECTIVE / OVERNIGHT EVENTS:  Pt seen earlier, had EGD.  No c/o pain, SOB, nausea.  Had black stool.    MEDICATIONS  (STANDING):  amLODIPine   Tablet 10 milliGRAM(s) Oral daily  ATENolol  Tablet 50 milliGRAM(s) Oral daily  atorvastatin 80 milliGRAM(s) Oral at bedtime  dextrose 5%. 1000 milliLiter(s) (50 mL/Hr) IV Continuous <Continuous>  dextrose 50% Injectable 12.5 Gram(s) IV Push once  dextrose 50% Injectable 25 Gram(s) IV Push once  dextrose 50% Injectable 25 Gram(s) IV Push once  donepezil 10 milliGRAM(s) Oral at bedtime  insulin glargine Injectable (LANTUS) 20 Unit(s) SubCutaneous at bedtime  insulin lispro (HumaLOG) corrective regimen sliding scale   SubCutaneous three times a day before meals  insulin lispro (HumaLOG) corrective regimen sliding scale   SubCutaneous at bedtime  lactated ringers. 1000 milliLiter(s) (30 mL/Hr) IV Continuous <Continuous>  lisinopril 40 milliGRAM(s) Oral daily  pantoprazole Infusion 8 mG/Hr (10 mL/Hr) IV Continuous <Continuous>  sertraline 100 milliGRAM(s) Oral daily    MEDICATIONS  (PRN):  ALBUTerol    90 MICROgram(s) HFA Inhaler 2 Puff(s) Inhalation every 6 hours PRN Shortness of Breath and/or Wheezing  dextrose 40% Gel 15 Gram(s) Oral once PRN Blood Glucose LESS THAN 70 milliGRAM(s)/deciliter  glucagon  Injectable 1 milliGRAM(s) IntraMuscular once PRN Glucose LESS THAN 70 milligrams/deciliter      CAPILLARY BLOOD GLUCOSE      POCT Blood Glucose.: 139 mg/dL (2019 16:29)  POCT Blood Glucose.: 105 mg/dL (2019 07:44)  POCT Blood Glucose.: 120 mg/dL (2019 06:33)  POCT Blood Glucose.: 213 mg/dL (10 Jul 2019 22:02)      I&O's Summary      Vital Signs Last 24 Hrs  T(C): 36.6 (2019 16:30), Max: 36.9 (10 Jul 2019 20:30)  T(F): 97.8 (2019 16:30), Max: 98.5 (10 Jul 2019 20:30)  HR: 61 (2019 16:30) (54 - 64)  BP: 151/60 (2019 16:30) (139/50 - 170/68)  BP(mean): --  RR: 18 (2019 16:30) (15 - 18)  SpO2: 98% (2019 16:30) (96% - 98%)    PHYSICAL EXAM:  GENERAL: NAD, well-developed  HEAD:  Atraumatic, Normocephalic  EYES: EOMI, PERRLA, conjunctiva and sclera clear  NECK: Supple, No JVD  CHEST/LUNG: Clear to auscultation bilaterally; No wheeze  HEART: Regular rate and rhythm; No murmurs, rubs, or gallops  ABDOMEN: Soft, Nontender, Nondistended; Bowel sounds present  EXTREMITIES:  2+ Peripheral Pulses, No clubbing, cyanosis, or edema  PSYCH: AAOx3  NEUROLOGY: non-focal  SKIN: No rashes or lesions    LABS:                        10.3   9.47  )-----------( 144      ( 2019 06:01 )             30.8     07-11    139  |  101  |  15  ----------------------------<  121<H>  3.3<L>   |  27  |  0.74    Ca    8.8      2019 06:01  Phos  1.8     07-11  Mg     1.5     07-11    TPro  5.5<L>  /  Alb  3.3  /  TBili  1.2  /  DBili  x   /  AST  20  /  ALT  16  /  AlkPhos  134<H>  07-11    PT/INR - ( 2019 06:01 )   PT: 11.5 SEC;   INR: 1.01            CARDIAC MARKERS ( 10 Jul 2019 01:10 )  x     / x     / 58 u/L / 1.84 ng/mL / x          Urinalysis Basic - ( 10 Jul 2019 22:20 )    Color: YELLOW / Appearance: Lt TURBID / S.017 / pH: 6.0  Gluc: NEGATIVE / Ketone: NEGATIVE  / Bili: NEGATIVE / Urobili: NORMAL   Blood: MODERATE / Protein: 50 / Nitrite: POSITIVE   Leuk Esterase: LARGE / RBC: 6-10 / WBC >50   Sq Epi: FEW / Non Sq Epi: x / Bacteria: MANY    Culture - Urine (07.10.19 @ 23:10)    Culture - Urine:   EC^Escherichia coli  COLONY COUNT: > = 100,000 CFU/ML    Specimen Source: URINE MIDSTREAM      RADIOLOGY & ADDITIONAL TESTS:    Imaging Personally Reviewed:    Consultant(s) Notes Reviewed:      Care Discussed with Consultants/Other Providers: RN, SW, CM, ADS, GI re overall care

## 2019-07-12 DIAGNOSIS — N30.00 ACUTE CYSTITIS WITHOUT HEMATURIA: ICD-10-CM

## 2019-07-12 LAB
-  AMIKACIN: SIGNIFICANT CHANGE UP
-  AMPICILLIN/SULBACTAM: SIGNIFICANT CHANGE UP
-  AMPICILLIN: SIGNIFICANT CHANGE UP
-  AZTREONAM: SIGNIFICANT CHANGE UP
-  CEFAZOLIN: SIGNIFICANT CHANGE UP
-  CEFEPIME: SIGNIFICANT CHANGE UP
-  CEFOXITIN: SIGNIFICANT CHANGE UP
-  CEFTAZIDIME: SIGNIFICANT CHANGE UP
-  CEFTRIAXONE: SIGNIFICANT CHANGE UP
-  CIPROFLOXACIN: SIGNIFICANT CHANGE UP
-  ERTAPENEM: SIGNIFICANT CHANGE UP
-  GENTAMICIN: SIGNIFICANT CHANGE UP
-  IMIPENEM: SIGNIFICANT CHANGE UP
-  LEVOFLOXACIN: SIGNIFICANT CHANGE UP
-  MEROPENEM: SIGNIFICANT CHANGE UP
-  NITROFURANTOIN: SIGNIFICANT CHANGE UP
-  PIPERACILLIN/TAZOBACTAM: SIGNIFICANT CHANGE UP
-  TIGECYCLINE: SIGNIFICANT CHANGE UP
-  TOBRAMYCIN: SIGNIFICANT CHANGE UP
-  TRIMETHOPRIM/SULFAMETHOXAZOLE: SIGNIFICANT CHANGE UP
ALBUMIN SERPL ELPH-MCNC: 3.3 G/DL — SIGNIFICANT CHANGE UP (ref 3.3–5)
ALP SERPL-CCNC: 132 U/L — HIGH (ref 40–120)
ALT FLD-CCNC: 16 U/L — SIGNIFICANT CHANGE UP (ref 4–33)
ANION GAP SERPL CALC-SCNC: 9 MMO/L — SIGNIFICANT CHANGE UP (ref 7–14)
AST SERPL-CCNC: 19 U/L — SIGNIFICANT CHANGE UP (ref 4–32)
BACTERIA UR CULT: SIGNIFICANT CHANGE UP
BILIRUB SERPL-MCNC: 0.9 MG/DL — SIGNIFICANT CHANGE UP (ref 0.2–1.2)
BUN SERPL-MCNC: 10 MG/DL — SIGNIFICANT CHANGE UP (ref 7–23)
CALCIUM SERPL-MCNC: 8.9 MG/DL — SIGNIFICANT CHANGE UP (ref 8.4–10.5)
CHLORIDE SERPL-SCNC: 103 MMOL/L — SIGNIFICANT CHANGE UP (ref 98–107)
CO2 SERPL-SCNC: 29 MMOL/L — SIGNIFICANT CHANGE UP (ref 22–31)
CREAT SERPL-MCNC: 0.76 MG/DL — SIGNIFICANT CHANGE UP (ref 0.5–1.3)
GLUCOSE BLDC GLUCOMTR-MCNC: 124 MG/DL — HIGH (ref 70–99)
GLUCOSE BLDC GLUCOMTR-MCNC: 168 MG/DL — HIGH (ref 70–99)
GLUCOSE BLDC GLUCOMTR-MCNC: 89 MG/DL — SIGNIFICANT CHANGE UP (ref 70–99)
GLUCOSE BLDC GLUCOMTR-MCNC: 92 MG/DL — SIGNIFICANT CHANGE UP (ref 70–99)
GLUCOSE BLDC GLUCOMTR-MCNC: 93 MG/DL — SIGNIFICANT CHANGE UP (ref 70–99)
GLUCOSE SERPL-MCNC: 102 MG/DL — HIGH (ref 70–99)
HCT VFR BLD CALC: 31.5 % — LOW (ref 34.5–45)
HGB BLD-MCNC: 10.5 G/DL — LOW (ref 11.5–15.5)
MAGNESIUM SERPL-MCNC: 1.4 MG/DL — LOW (ref 1.6–2.6)
MCHC RBC-ENTMCNC: 28.8 PG — SIGNIFICANT CHANGE UP (ref 27–34)
MCHC RBC-ENTMCNC: 33.3 % — SIGNIFICANT CHANGE UP (ref 32–36)
MCV RBC AUTO: 86.3 FL — SIGNIFICANT CHANGE UP (ref 80–100)
METHOD TYPE: SIGNIFICANT CHANGE UP
NRBC # FLD: 0 K/UL — SIGNIFICANT CHANGE UP (ref 0–0)
ORGANISM # SPEC MICROSCOPIC CNT: SIGNIFICANT CHANGE UP
ORGANISM # SPEC MICROSCOPIC CNT: SIGNIFICANT CHANGE UP
PHOSPHATE SERPL-MCNC: 2.9 MG/DL — SIGNIFICANT CHANGE UP (ref 2.5–4.5)
PLATELET # BLD AUTO: 153 K/UL — SIGNIFICANT CHANGE UP (ref 150–400)
PMV BLD: 10.3 FL — SIGNIFICANT CHANGE UP (ref 7–13)
POTASSIUM SERPL-MCNC: 3.2 MMOL/L — LOW (ref 3.5–5.3)
POTASSIUM SERPL-SCNC: 3.2 MMOL/L — LOW (ref 3.5–5.3)
PROT SERPL-MCNC: 5.6 G/DL — LOW (ref 6–8.3)
RBC # BLD: 3.65 M/UL — LOW (ref 3.8–5.2)
RBC # FLD: 13.7 % — SIGNIFICANT CHANGE UP (ref 10.3–14.5)
SODIUM SERPL-SCNC: 141 MMOL/L — SIGNIFICANT CHANGE UP (ref 135–145)
WBC # BLD: 11.02 K/UL — HIGH (ref 3.8–10.5)
WBC # FLD AUTO: 11.02 K/UL — HIGH (ref 3.8–10.5)

## 2019-07-12 PROCEDURE — 99232 SBSQ HOSP IP/OBS MODERATE 35: CPT | Mod: GC

## 2019-07-12 PROCEDURE — 99239 HOSP IP/OBS DSCHRG MGMT >30: CPT

## 2019-07-12 RX ORDER — MAGNESIUM SULFATE 500 MG/ML
2 VIAL (ML) INJECTION ONCE
Refills: 0 | Status: COMPLETED | OUTPATIENT
Start: 2019-07-12 | End: 2019-07-12

## 2019-07-12 RX ORDER — ASPIRIN/CALCIUM CARB/MAGNESIUM 324 MG
1 TABLET ORAL
Qty: 0 | Refills: 0 | DISCHARGE
Start: 2019-07-12

## 2019-07-12 RX ORDER — PANTOPRAZOLE SODIUM 20 MG/1
40 TABLET, DELAYED RELEASE ORAL
Refills: 0 | Status: DISCONTINUED | OUTPATIENT
Start: 2019-07-12 | End: 2019-07-13

## 2019-07-12 RX ORDER — CEFAZOLIN SODIUM 1 G
VIAL (EA) INJECTION
Refills: 0 | Status: DISCONTINUED | OUTPATIENT
Start: 2019-07-12 | End: 2019-07-13

## 2019-07-12 RX ORDER — CEFAZOLIN SODIUM 1 G
1000 VIAL (EA) INJECTION ONCE
Refills: 0 | Status: COMPLETED | OUTPATIENT
Start: 2019-07-12 | End: 2019-07-12

## 2019-07-12 RX ORDER — CEFAZOLIN SODIUM 1 G
1000 VIAL (EA) INJECTION EVERY 8 HOURS
Refills: 0 | Status: DISCONTINUED | OUTPATIENT
Start: 2019-07-12 | End: 2019-07-13

## 2019-07-12 RX ORDER — POTASSIUM CHLORIDE 20 MEQ
40 PACKET (EA) ORAL EVERY 4 HOURS
Refills: 0 | Status: COMPLETED | OUTPATIENT
Start: 2019-07-12 | End: 2019-07-12

## 2019-07-12 RX ORDER — MAGNESIUM SULFATE 500 MG/ML
1 VIAL (ML) INJECTION ONCE
Refills: 0 | Status: DISCONTINUED | OUTPATIENT
Start: 2019-07-12 | End: 2019-07-12

## 2019-07-12 RX ORDER — PANTOPRAZOLE SODIUM 20 MG/1
1 TABLET, DELAYED RELEASE ORAL
Qty: 30 | Refills: 0
Start: 2019-07-12 | End: 2019-08-10

## 2019-07-12 RX ADMIN — INSULIN GLARGINE 20 UNIT(S): 100 INJECTION, SOLUTION SUBCUTANEOUS at 22:21

## 2019-07-12 RX ADMIN — Medication 100 MILLIGRAM(S): at 21:16

## 2019-07-12 RX ADMIN — ATENOLOL 50 MILLIGRAM(S): 25 TABLET ORAL at 05:30

## 2019-07-12 RX ADMIN — PANTOPRAZOLE SODIUM 10 MG/HR: 20 TABLET, DELAYED RELEASE ORAL at 08:57

## 2019-07-12 RX ADMIN — Medication 1: at 17:20

## 2019-07-12 RX ADMIN — Medication 50 GRAM(S): at 08:56

## 2019-07-12 RX ADMIN — DONEPEZIL HYDROCHLORIDE 10 MILLIGRAM(S): 10 TABLET, FILM COATED ORAL at 21:16

## 2019-07-12 RX ADMIN — PANTOPRAZOLE SODIUM 10 MG/HR: 20 TABLET, DELAYED RELEASE ORAL at 04:27

## 2019-07-12 RX ADMIN — Medication 100 MILLIGRAM(S): at 14:47

## 2019-07-12 RX ADMIN — ATORVASTATIN CALCIUM 80 MILLIGRAM(S): 80 TABLET, FILM COATED ORAL at 21:17

## 2019-07-12 RX ADMIN — AMLODIPINE BESYLATE 10 MILLIGRAM(S): 2.5 TABLET ORAL at 05:30

## 2019-07-12 RX ADMIN — LISINOPRIL 40 MILLIGRAM(S): 2.5 TABLET ORAL at 05:30

## 2019-07-12 RX ADMIN — SERTRALINE 100 MILLIGRAM(S): 25 TABLET, FILM COATED ORAL at 12:36

## 2019-07-12 RX ADMIN — Medication 40 MILLIEQUIVALENT(S): at 18:40

## 2019-07-12 RX ADMIN — Medication 40 MILLIEQUIVALENT(S): at 12:36

## 2019-07-12 NOTE — DISCHARGE NOTE NURSING/CASE MANAGEMENT/SOCIAL WORK - NSDCDPATPORTLINK_GEN_ALL_CORE
You can access the "[email or web address] Patient Portal, offered by [facility], by registering with the following website: [email or web address]

## 2019-07-12 NOTE — PROGRESS NOTE ADULT - ASSESSMENT
Impression:    1. Melena, drop in hemoglobin to 6.8 previously this had been 12 in 5/18 Hemoglobin was 9 on admission. She likely swallowed a lot of blood during her dental procedure causing melena. EGD Negative. Melena was likely due to swallowed blood from dental procedure    2. Thrombocytopenia, mildly elevated alk phos: no stigmata of cirrhosis on exam however patient has been told her liver is inflamed.    3. History of CVA on aspirin and plavix    4. Hyperglycemia    Recommendation:  -no further GI w/u planned or objection to antiplatelets Impression:    1. Melena, drop in hemoglobin to 6.8 previously this had been 12 in 5/18 Hemoglobin was 9 on admission. She likely swallowed a lot of blood during her dental procedure causing melena. EGD revealed only a small gastric ulcer without stigmata of recent hemorrhage. Melena was likely due to swallowed blood from dental procedure    2. Thrombocytopenia, mildly elevated alk phos: no stigmata of cirrhosis on exam however patient has been told her liver is inflamed.    3. History of CVA on aspirin and plavix    4. Hyperglycemia    Recommendation:  -no further GI w/u planned or objection to antiplatelets  -pantoprazole 40 mg od ac  -follow-up pathology results to rule out H. pylori

## 2019-07-12 NOTE — DISCHARGE NOTE PROVIDER - NSDCCPCAREPLAN_GEN_ALL_CORE_FT
PRINCIPAL DISCHARGE DIAGNOSIS  Diagnosis: GIB (gastrointestinal bleeding)  Assessment and Plan of Treatment: resolved. An EGD was performed and no stigmata of bleeding was noted. Biopsies were taken and sent out to pathology. Follow up with gastroenterology in 1-2 weeks for biopsy results.      SECONDARY DISCHARGE DIAGNOSES  Diagnosis: DM (diabetes mellitus)  Assessment and Plan of Treatment: Continue Insulin as prescribed. Maintain a consistent carbohydrate diet. Check fingersticks daily and keep record of your results. Bring results to your next appointment with your endocrinologist or PCP.    Diagnosis: Depression  Assessment and Plan of Treatment: Continue Zoloft. Follow up with PCP in 1 week for re-eval and further medication management.    Diagnosis: Cerebrovascular accident (CVA), unspecified mechanism  Assessment and Plan of Treatment: Hold Plavix and Aspirin in light of recent GI bleed. Follow up with your Neurologist at [facility] in 1 week to discuss re-starting anticoagulation.    Diagnosis: HTN (hypertension)  Assessment and Plan of Treatment: Continue blood pressure medications as prescribed. Follow up with PCP for repeat BP check in 1 week. PRINCIPAL DISCHARGE DIAGNOSIS  Diagnosis: GIB (gastrointestinal bleeding)  Assessment and Plan of Treatment: resolved. An EGD was performed and no stigmata of bleeding was noted. Biopsies were taken and sent out to pathology. Follow up with gastroenterology in 1-2 weeks for biopsy results.      SECONDARY DISCHARGE DIAGNOSES  Diagnosis: DM (diabetes mellitus)  Assessment and Plan of Treatment: Continue Insulin as prescribed. Maintain a consistent carbohydrate diet. If diet reliable and fingersticks >150, resume premeal insulin. If not, just continue Lantus at night until appetite improves. Check fingersticks daily and keep record of your results. Bring results to your next appointment with your endocrinologist or PCP.    Diagnosis: Depression  Assessment and Plan of Treatment: Continue Zoloft. Follow up with PCP in 1 week for re-eval and further medication management.    Diagnosis: Cerebrovascular accident (CVA), unspecified mechanism  Assessment and Plan of Treatment: Hold Plavix and Aspirin in light of recent GI bleed. Follow up with your Neurologist at [facility] in 1 week to discuss re-starting anticoagulation.    Diagnosis: HTN (hypertension)  Assessment and Plan of Treatment: Continue blood pressure medications as prescribed. Follow up with PCP for repeat BP check in 1 week. PRINCIPAL DISCHARGE DIAGNOSIS  Diagnosis: GIB (gastrointestinal bleeding)  Assessment and Plan of Treatment: resolved. An EGD was performed and no stigmata of bleeding was noted. Biopsies were taken and sent out to pathology. Follow up with gastroenterology in 1-2 weeks for biopsy results.      SECONDARY DISCHARGE DIAGNOSES  Diagnosis: Cerebrovascular accident (CVA), unspecified mechanism  Assessment and Plan of Treatment: Hold Plavix and Aspirin in light of recent GI bleed. Follow up with your Neurologist at [facility].  You may restart the aspirin.  You likely do not need to restart the Plavix.    Diagnosis: DM (diabetes mellitus)  Assessment and Plan of Treatment: Continue Insulin as prescribed. Maintain a consistent carbohydrate diet. If diet reliable and fingersticks >150, resume premeal insulin. If not, just continue Lantus at night until appetite improves. Check fingersticks daily and keep record of your results. Bring results to your next appointment with your endocrinologist or PCP.    Diagnosis: Depression  Assessment and Plan of Treatment: Continue Zoloft. Follow up with PCP in 1 week for re-eval and further medication management.    Diagnosis: HTN (hypertension)  Assessment and Plan of Treatment: Continue blood pressure medications as prescribed. Follow up with PCP for repeat BP check in 1 week. PRINCIPAL DISCHARGE DIAGNOSIS  Diagnosis: GIB (gastrointestinal bleeding)  Assessment and Plan of Treatment: resolved. An EGD was performed and no stigmata of bleeding was noted. Biopsies were taken and sent out to pathology. Follow up with gastroenterology in 1-2 weeks for biopsy results.      SECONDARY DISCHARGE DIAGNOSES  Diagnosis: Cerebrovascular accident (CVA), unspecified mechanism  Assessment and Plan of Treatment: On admission Plavix was held because of the bleeding. Follow up with your Neurologist at [facility].  You may restart the aspirin.  You likely do NOT need to restart the Plavix.    Diagnosis: DM (diabetes mellitus)  Assessment and Plan of Treatment: Continue Insulin as prescribed. Maintain a consistent carbohydrate diet. If diet reliable and fingersticks >150, resume premeal insulin. If not, just continue Lantus at night until appetite improves. Check fingersticks daily and keep record of your results. Bring results to your next appointment with your endocrinologist or PCP.    Diagnosis: Depression  Assessment and Plan of Treatment: Continue Zoloft. Follow up with PCP in 1 week for re-eval and further medication management.    Diagnosis: HTN (hypertension)  Assessment and Plan of Treatment: Continue blood pressure medications as prescribed. Follow up with PCP for repeat BP check in 1 week. PRINCIPAL DISCHARGE DIAGNOSIS  Diagnosis: GIB (gastrointestinal bleeding)  Assessment and Plan of Treatment: resolved. An EGD was performed and no stigmata of bleeding was noted. Biopsies were taken and sent out to pathology. Follow up with gastroenterology in 1-2 weeks for biopsy results.      SECONDARY DISCHARGE DIAGNOSES  Diagnosis: DM (diabetes mellitus)  Assessment and Plan of Treatment: Continue Insulin as prescribed. Maintain a consistent carbohydrate diet. If diet reliable and fingersticks >150, resume premeal insulin. If not, just continue Lantus at night until appetite improves. Check fingersticks daily and keep record of your results. Bring results to your next appointment with your endocrinologist or PCP.    Diagnosis: Depression  Assessment and Plan of Treatment: Continue Zoloft. Follow up with PCP in 1 week for re-eval and further medication management.    Diagnosis: Cerebrovascular accident (CVA), unspecified mechanism  Assessment and Plan of Treatment: On admission Plavix was held because of the bleeding. Follow up with your Neurologist at [facility].  You may restart the aspirin.  You likely do NOT need to restart the Plavix.    Diagnosis: HTN (hypertension)  Assessment and Plan of Treatment: Continue blood pressure medications as prescribed. Follow up with PCP for repeat BP check in 1 week.

## 2019-07-12 NOTE — PROGRESS NOTE ADULT - ASSESSMENT
68 year old [place] speaking female with PMHx of CVA (left sided residual weakness), DM Type II, HTN, HLD, depression, dementia, presents to the ED for evaluation of weakness x 1 day with anemia likely secondary to blood loss in the setting of dental procedure vs GI bleed. 68 year old [place] speaking female with PMHx of CVA (left sided residual weakness), DM Type II, HTN, HLD, depression, dementia, presents to the ED for evaluation of weakness x 1 day with anemia likely secondary to blood loss in the setting of dental procedure

## 2019-07-12 NOTE — PROGRESS NOTE ADULT - PROBLEM SELECTOR PLAN 6
Check fasting lipid panel  Continue Atorvastatin HgA1C is 8.4, FSBGs  Continue Lantus and Humalog with insulin sliding scale for coverage  - resume premeal on d/c if FSBGs >150s

## 2019-07-12 NOTE — PROGRESS NOTE ADULT - SUBJECTIVE AND OBJECTIVE BOX
Patient is a 68y old  Female who presents with a chief complaint of Weakness (2019 19:46)      SUBJECTIVE / OVERNIGHT EVENTS:  No problems reported...    MEDICATIONS  (STANDING):  amLODIPine   Tablet 10 milliGRAM(s) Oral daily  ATENolol  Tablet 50 milliGRAM(s) Oral daily  atorvastatin 80 milliGRAM(s) Oral at bedtime  dextrose 5%. 1000 milliLiter(s) (50 mL/Hr) IV Continuous <Continuous>  dextrose 50% Injectable 12.5 Gram(s) IV Push once  dextrose 50% Injectable 25 Gram(s) IV Push once  dextrose 50% Injectable 25 Gram(s) IV Push once  donepezil 10 milliGRAM(s) Oral at bedtime  insulin glargine Injectable (LANTUS) 20 Unit(s) SubCutaneous at bedtime  insulin lispro (HumaLOG) corrective regimen sliding scale   SubCutaneous three times a day before meals  insulin lispro (HumaLOG) corrective regimen sliding scale   SubCutaneous at bedtime  lactated ringers. 1000 milliLiter(s) (30 mL/Hr) IV Continuous <Continuous>  lisinopril 40 milliGRAM(s) Oral daily  magnesium sulfate  IVPB 2 Gram(s) IV Intermittent once  pantoprazole Infusion 8 mG/Hr (10 mL/Hr) IV Continuous <Continuous>  potassium chloride    Tablet ER 40 milliEquivalent(s) Oral every 4 hours  sertraline 100 milliGRAM(s) Oral daily    MEDICATIONS  (PRN):  ALBUTerol    90 MICROgram(s) HFA Inhaler 2 Puff(s) Inhalation every 6 hours PRN Shortness of Breath and/or Wheezing  dextrose 40% Gel 15 Gram(s) Oral once PRN Blood Glucose LESS THAN 70 milliGRAM(s)/deciliter  glucagon  Injectable 1 milliGRAM(s) IntraMuscular once PRN Glucose LESS THAN 70 milligrams/deciliter      CAPILLARY BLOOD GLUCOSE      POCT Blood Glucose.: 159 mg/dL (2019 21:47)  POCT Blood Glucose.: 139 mg/dL (2019 16:29)      I&O's Summary      Vital Signs Last 24 Hrs  T(C): 36.7 (2019 04:30), Max: 36.9 (2019 00:30)  T(F): 98.1 (2019 04:30), Max: 98.4 (2019 00:30)  HR: 63 (2019 04:30) (54 - 63)  BP: 154/66 (2019 04:30) (137/59 - 159/61)  BP(mean): --  RR: 17 (2019 04:30) (17 - 18)  SpO2: 97% (2019 04:30) (97% - 100%)    PHYSICAL EXAM:  GENERAL: NAD, well-developed  HEAD:  Atraumatic, Normocephalic  EYES: EOMI, PERRLA, conjunctiva and sclera clear  NECK: Supple, No JVD  CHEST/LUNG: Clear to auscultation bilaterally; No wheeze  HEART: Regular rate and rhythm; No murmurs, rubs, or gallops  ABDOMEN: Soft, Nontender, Nondistended; Bowel sounds present  EXTREMITIES:  2+ Peripheral Pulses, No clubbing, cyanosis, or edema  PSYCH: AAOx3  NEUROLOGY: non-focal  SKIN: No rashes or lesions    LABS:                        10.5   11.02 )-----------( 153      ( 2019 05:52 )             31.5     07-12    141  |  103  |  10  ----------------------------<  102<H>  3.2<L>   |  29  |  0.76    Ca    8.9      2019 05:52  Phos  2.9     07-12  Mg     1.4     07-12    TPro  5.6<L>  /  Alb  3.3  /  TBili  0.9  /  DBili  x   /  AST  19  /  ALT  16  /  AlkPhos  132<H>  07-12    PT/INR - ( 2019 06:01 )   PT: 11.5 SEC;   INR: 1.01                Urinalysis Basic - ( 10 Jul 2019 22:20 )    Color: YELLOW / Appearance: Lt TURBID / S.017 / pH: 6.0  Gluc: NEGATIVE / Ketone: NEGATIVE  / Bili: NEGATIVE / Urobili: NORMAL   Blood: MODERATE / Protein: 50 / Nitrite: POSITIVE   Leuk Esterase: LARGE / RBC: 6-10 / WBC >50   Sq Epi: FEW / Non Sq Epi: x / Bacteria: MANY        RADIOLOGY & ADDITIONAL TESTS:    Imaging Personally Reviewed:    Consultant(s) Notes Reviewed:  GI    Care Discussed with Consultants/Other Providers: RN, SW, CM, ADS re overall care Patient is a 68y old  Female who presents with a chief complaint of Weakness (2019 19:46)      SUBJECTIVE / OVERNIGHT EVENTS:  No problems reported.  Continues to have black stool.  +foul odor to urine when walked in her room.    Daughter translated by phone.  Pt denies dysuria but says she urinate a lot.  No pain, SOB, nausea.     MEDICATIONS  (STANDING):  amLODIPine   Tablet 10 milliGRAM(s) Oral daily  ATENolol  Tablet 50 milliGRAM(s) Oral daily  atorvastatin 80 milliGRAM(s) Oral at bedtime  dextrose 5%. 1000 milliLiter(s) (50 mL/Hr) IV Continuous <Continuous>  dextrose 50% Injectable 12.5 Gram(s) IV Push once  dextrose 50% Injectable 25 Gram(s) IV Push once  dextrose 50% Injectable 25 Gram(s) IV Push once  donepezil 10 milliGRAM(s) Oral at bedtime  insulin glargine Injectable (LANTUS) 20 Unit(s) SubCutaneous at bedtime  insulin lispro (HumaLOG) corrective regimen sliding scale   SubCutaneous three times a day before meals  insulin lispro (HumaLOG) corrective regimen sliding scale   SubCutaneous at bedtime  lactated ringers. 1000 milliLiter(s) (30 mL/Hr) IV Continuous <Continuous>  lisinopril 40 milliGRAM(s) Oral daily  magnesium sulfate  IVPB 2 Gram(s) IV Intermittent once  pantoprazole Infusion 8 mG/Hr (10 mL/Hr) IV Continuous <Continuous>  potassium chloride    Tablet ER 40 milliEquivalent(s) Oral every 4 hours  sertraline 100 milliGRAM(s) Oral daily    MEDICATIONS  (PRN):  ALBUTerol    90 MICROgram(s) HFA Inhaler 2 Puff(s) Inhalation every 6 hours PRN Shortness of Breath and/or Wheezing  dextrose 40% Gel 15 Gram(s) Oral once PRN Blood Glucose LESS THAN 70 milliGRAM(s)/deciliter  glucagon  Injectable 1 milliGRAM(s) IntraMuscular once PRN Glucose LESS THAN 70 milligrams/deciliter      CAPILLARY BLOOD GLUCOSE      POCT Blood Glucose.: 159 mg/dL (2019 21:47)  POCT Blood Glucose.: 139 mg/dL (2019 16:29)      I&O's Summary      Vital Signs Last 24 Hrs  T(C): 36.7 (2019 04:30), Max: 36.9 (2019 00:30)  T(F): 98.1 (2019 04:30), Max: 98.4 (2019 00:30)  HR: 63 (2019 04:30) (54 - 63)  BP: 154/66 (2019 04:30) (137/59 - 159/61)  BP(mean): --  RR: 17 (2019 04:30) (17 - 18)  SpO2: 97% (2019 04:30) (97% - 100%)    PHYSICAL EXAM:  GENERAL: NAD, well-developed  HEAD:  Atraumatic, Normocephalic  EYES: EOMI, PERRLA, conjunctiva and sclera clear  NECK: Supple, No JVD  CHEST/LUNG: Clear to auscultation bilaterally; No wheeze  HEART: Regular rate and rhythm; No murmurs, rubs, or gallops  ABDOMEN: Soft, Nontender, Nondistended; Bowel sounds present  EXTREMITIES:  2+ Peripheral Pulses, No clubbing, cyanosis, or edema  PSYCH: AAOx3  NEUROLOGY: non-focal  SKIN: No rashes or lesions    LABS:                        10.5   11.02 )-----------( 153      ( 2019 05:52 )             31.5     07-12    141  |  103  |  10  ----------------------------<  102<H>  3.2<L>   |  29  |  0.76    Ca    8.9      2019 05:52  Phos  2.9     07-12  Mg     1.4     07-12    TPro  5.6<L>  /  Alb  3.3  /  TBili  0.9  /  DBili  x   /  AST  19  /  ALT  16  /  AlkPhos  132<H>  07-12    PT/INR - ( 2019 06:01 )   PT: 11.5 SEC;   INR: 1.01                Urinalysis Basic - ( 10 Jul 2019 22:20 )    Color: YELLOW / Appearance: Lt TURBID / S.017 / pH: 6.0  Gluc: NEGATIVE / Ketone: NEGATIVE  / Bili: NEGATIVE / Urobili: NORMAL   Blood: MODERATE / Protein: 50 / Nitrite: POSITIVE   Leuk Esterase: LARGE / RBC: 6-10 / WBC >50   Sq Epi: FEW / Non Sq Epi: x / Bacteria: MANY        RADIOLOGY & ADDITIONAL TESTS:    Imaging Personally Reviewed:    Consultant(s) Notes Reviewed:  GI    Care Discussed with Consultants/Other Providers: RN, SW, CM, ADS re overall care

## 2019-07-12 NOTE — DISCHARGE NOTE NURSING/CASE MANAGEMENT/SOCIAL WORK - NSSCNAMETXT_GEN_ALL_CORE
[facility] [phone].  Nurse to visit on the day after discharge.  Other appropriate services to be arranged thereafter.   Aide 5 hours/5 days through [facility] [phone]. Agency is [facility] [phone].

## 2019-07-12 NOTE — PROGRESS NOTE ADULT - PROBLEM SELECTOR PLAN 2
s/p 2u PRBCs and 1u platelets, Occult positive  s/p EGD - , no stigmata bleeding, change to PPI qd  Hold ASA and Plavix Patient receiving 2u PRBCs and 1u platelets - responded well  seems bleeding was from dental procedure, EGD  no stigmata recent bleeding  ASA and Plavix held

## 2019-07-12 NOTE — PROGRESS NOTE ADULT - ATTENDING COMMENTS
UA/cx + but pt asymptomatic, f/u clinically d/w daughter Miranda cell [phone], office [phone], will pick pt up tomorrow, HC services reinstated  f/u PMD Dr. Ayala at [facility] d/w daughter Miranda cell [phone], office [phone], will pick pt up tomorrow, HC services reinstated  f/u PMD Dr. Ayala at [facility]    If Hb remains stable, she is stable for d/c tomorrow, d/w daughter, picking pt up at noon.  Spent 35 minutes counseling and coordinating discharge care.

## 2019-07-12 NOTE — PROGRESS NOTE ADULT - PROBLEM SELECTOR PLAN 1
Patient receiving 2u PRBCs and 1u platelets - responded well  seems bleeding was from dental procedure, EGD  no stigmata recent bleeding  ASA and Plavix held ecoli, +odor and frequency - will give 3 days Keflex

## 2019-07-12 NOTE — PROGRESS NOTE ADULT - PROBLEM SELECTOR PLAN 3
Near syncope in the setting of blood loss, Tele monitoring, echocardiogram  Fall precautions, ambulate with assistance  PT eval ordered s/p 2u PRBCs and 1u platelets, Occult positive  s/p EGD - , no stigmata bleeding, change to PPI qd  ASA and Plavix held

## 2019-07-12 NOTE — PROGRESS NOTE ADULT - SUBJECTIVE AND OBJECTIVE BOX
Patient is a 68y old  Female who presents with a chief complaint of Weakness (2019 08:11)      SUBJECTIVE / OVERNIGHT EVENTS:  no events  MEDICATIONS  (STANDING):  amLODIPine   Tablet 10 milliGRAM(s) Oral daily  ATENolol  Tablet 50 milliGRAM(s) Oral daily  atorvastatin 80 milliGRAM(s) Oral at bedtime  dextrose 5%. 1000 milliLiter(s) (50 mL/Hr) IV Continuous <Continuous>  dextrose 50% Injectable 12.5 Gram(s) IV Push once  dextrose 50% Injectable 25 Gram(s) IV Push once  dextrose 50% Injectable 25 Gram(s) IV Push once  donepezil 10 milliGRAM(s) Oral at bedtime  insulin glargine Injectable (LANTUS) 20 Unit(s) SubCutaneous at bedtime  insulin lispro (HumaLOG) corrective regimen sliding scale   SubCutaneous three times a day before meals  insulin lispro (HumaLOG) corrective regimen sliding scale   SubCutaneous at bedtime  lactated ringers. 1000 milliLiter(s) (30 mL/Hr) IV Continuous <Continuous>  lisinopril 40 milliGRAM(s) Oral daily  magnesium sulfate  IVPB 2 Gram(s) IV Intermittent once  pantoprazole Infusion 8 mG/Hr (10 mL/Hr) IV Continuous <Continuous>  potassium chloride    Tablet ER 40 milliEquivalent(s) Oral every 4 hours  sertraline 100 milliGRAM(s) Oral daily    MEDICATIONS  (PRN):  ALBUTerol    90 MICROgram(s) HFA Inhaler 2 Puff(s) Inhalation every 6 hours PRN Shortness of Breath and/or Wheezing  dextrose 40% Gel 15 Gram(s) Oral once PRN Blood Glucose LESS THAN 70 milliGRAM(s)/deciliter  glucagon  Injectable 1 milliGRAM(s) IntraMuscular once PRN Glucose LESS THAN 70 milligrams/deciliter              PHYSICAL EXAM:  GENERAL: NAD, well-developed  HEAD:  Atraumatic, Normocephalic  EYES: EOMI, PERRLA, conjunctiva and sclera anicteric  NECK: Supple, No JVD  CHEST/LUNG: Clear to auscultation bilaterally; No wheeze  HEART: Regular rate and rhythm; No murmurs, rubs, or gallops  ABDOMEN: Soft, Nontender, Nondistended; Bowel sounds present, no hepatosplenomegaly, no rebound or guarding  EXTREMITIES:  2+ Peripheral Pulses, No clubbing, cyanosis, or edema  PSYCH: AAOx3  NEUROLOGY: non-focal, no asterixis  SKIN: No rashes or lesion    LABS:                        10.5   11.02 )-----------( 153      ( 2019 05:52 )             31.5     -12    141  |  103  |  10  ----------------------------<  102<H>  3.2<L>   |  29  |  0.76    Ca    8.9      2019 05:52  Phos  2.9       Mg     1.4         TPro  5.6<L>  /  Alb  3.3  /  TBili  0.9  /  DBili  x   /  AST  19  /  ALT  16  /  AlkPhos  132<H>  12    LIVER FUNCTIONS - ( 2019 05:52 )  Alb: 3.3 g/dL / Pro: 5.6 g/dL / ALK PHOS: 132 u/L / ALT: 16 u/L / AST: 19 u/L / GGT: x           PT/INR - ( 2019 06:01 )   PT: 11.5 SEC;   INR: 1.01                Urinalysis Basic - ( 10 Jul 2019 22:20 )    Color: YELLOW / Appearance: Lt TURBID / S.017 / pH: 6.0  Gluc: NEGATIVE / Ketone: NEGATIVE  / Bili: NEGATIVE / Urobili: NORMAL   Blood: MODERATE / Protein: 50 / Nitrite: POSITIVE   Leuk Esterase: LARGE / RBC: 6-10 / WBC >50   Sq Epi: FEW / Non Sq Epi: x / Bacteria: MANY        RADIOLOGY & ADDITIONAL TESTS:

## 2019-07-12 NOTE — DISCHARGE NOTE PROVIDER - HOSPITAL COURSE
Occult positive anemia secondary to GI bleed    - GI following (house)    - H/H: 6.8/20.9    - Occult: Positive- thought to be blood from recent dental procedure    - S/P 2 units pRBC    - ASA and Plavix held    - Clear liquid diet and NPO after midnight for possible EGD and colonoscopy    - Protonix gtt    - Abd US- No cholelithiasis or sonographic evidence of acute cholecystitis.    - LE Duplex- negative for DVT    -Endoscopy- - Gastric ulcer with no stigmata of bleeding. Biopsys taken from gastrc antrum where 3mm lesion noted.        2. Near syncope    - Likely in the setting of GI bleed and orthostasis    - EKG: Sinus bradycardia at 56 bpm with sinus arrhythmia    - CE x2: Trop 11-->9, CK negative    - 7/10 CXR: Clear lungs. No acute displaced fracture.    - 7/10 CT head: No acute intracranial hemorrhage, mass effect or midline shift. Mild chronic microvascular ischemic gliotic changes. No displaced calvarial fracture or significant scalp hematoma.        3. Orthostatic positive    - S/P 2L NS in ED, S/P NS at 75 cc/hr    - Check orthostatics daily -     - Fall precautions        4. Uncontrolled DM    - HgA1C 8.4    - Resume basal/bolus insulin with ISS for coverage    - Monitor finger sticks 68 year old Icelandic speaking female with PMHx of CVA (left sided residual weakness), DM Type II, HTN, HLD, depression, dementia, presents to the ED for evaluation of weakness x 1 day with     anemia likely secondary to blood loss in the setting of dental procedure        Anemia. 2u PRBCs and 1u platelets - responded well, seems bleeding was from dental procedure, EGD  no stigmata recent bleeding    ASA and Plavix held.        GIB (gastrointestinal bleeding) s/p 2u PRBCs and 1u platelets, Occult positive, s/p EGD - , no stigmata bleeding, change to PPI qd        Near-syncope or syncope. Near syncope in the setting of blood loss, Tele negative    echo can be done as outpt    Fall precautions, ambulate with assistance.        Cerebrovascular accident (CVA), unspecified mechanism. ASA/plavix held - per recent guideline can d/c Plavix, just c/w ASA, outpt f/u.        DM (diabetes mellitus).HgA1C is 8.4, FSBGs - Continue Lantus and Humalog with insulin sliding scale for coverage    - resume premeal on d/c if FSBGs >150s.        Attending Attestation:     d/w daughter Miranda cell [phone], office [phone], will pick pt up tomorrow, HC services reinstated    f/u PMD Dr. Ayala at [facility] . 68 year old Albanian speaking female with PMHx of CVA (left sided residual weakness), DM Type II, HTN, HLD, depression, dementia, presents to the ED for evaluation of weakness x 1 day with     anemia likely secondary to blood loss in the setting of dental procedure        Anemia. 2u PRBCs and 1u platelets - responded well, seems bleeding was from dental procedure, EGD  no stigmata recent bleeding    ASA and Plavix held.        GIB (gastrointestinal bleeding) s/p 2u PRBCs and 1u platelets, Occult positive, s/p EGD - , no stigmata bleeding, change to PPI qd        Near-syncope or syncope. Near syncope in the setting of blood loss, Tele negative    echo can be done as outpt    Fall precautions, ambulate with assistance.        Cerebrovascular accident (CVA), unspecified mechanism. ASA/plavix held - per recent guideline can d/c Plavix, recommend c/w ASA, outpt f/u with neuro/PMD.        DM (diabetes mellitus).HgA1C is 8.4, FSBGs - Continue Lantus and Humalog with insulin sliding scale for coverage    - resume premeal on d/c if FSBGs >150s.        Attending Attestation:     d/w daughter Miranda cell [phone], office [phone], will pick pt up tomorrow, HC services reinstated    f/u PMD Dr. Ayala at [facility] . 68 year old Welsh speaking female with PMHx of CVA (left sided residual weakness), DM Type II, HTN, HLD, depression, dementia, presents to the ED for evaluation of weakness x 1 day with     anemia likely secondary to blood loss in the setting of dental procedure        Anemia. 2u PRBCs and 1u platelets - responded well, seems bleeding was from dental procedure, EGD  no stigmata recent bleeding    ASA and Plavix held while admitted.        GIB (gastrointestinal bleeding) s/p 2u PRBCs and 1u platelets, Occult positive, s/p EGD - , no stigmata bleeding, change to PPI qd        Near-syncope or syncope. Near syncope in the setting of blood loss, Tele negative    echo can be done as outpt    Fall precautions, ambulate with assistance.        Cerebrovascular accident (CVA), unspecified mechanism. ASA/plavix held - per recent guideline can d/c Plavix, recommend c/w ASA, outpt f/u with neuro/PMD.        DM (diabetes mellitus).HgA1C is 8.4, FSBGs - Continue Lantus and Humalog with insulin sliding scale for coverage    - resume premeal on d/c if FSBGs >150s.        Attending Attestation:     d/w daughter Miranda cell [phone], office [phone], will pick pt up tomorrow, HC services reinstated    f/u PMD Dr. Ayala at [facility] .

## 2019-07-12 NOTE — PROGRESS NOTE ADULT - PROBLEM SELECTOR PLAN 4
ASA/plavix held Near syncope in the setting of blood loss, Tele negative  echo can be done as outpt  Fall precautions, ambulate with assistance

## 2019-07-12 NOTE — PROGRESS NOTE ADULT - PROBLEM SELECTOR PLAN 7
Monitor BP  Continue Lisinopril, Atenolol, Amlodipine  Low salt diet Check fasting lipid panel  Continue Atorvastatin

## 2019-07-12 NOTE — DISCHARGE NOTE PROVIDER - PROVIDER TOKENS
FREE:[LAST:[Dr. Ayala],PHONE:[(   )    -],FAX:[(   )    -],ADDRESS:[[facility]  Neurology]]

## 2019-07-12 NOTE — PROGRESS NOTE ADULT - PROBLEM SELECTOR PLAN 5
HgA1C is 8.4, FSBGs  Continue Lantus and Humalog with insulin sliding scale for coverage ASA/plavix held - per recent guideline can d/c Plavix, just c/w ASA, outpt f/u ASA/plavix held - per recent guideline likely does not need Plavix any longer (daughter does not know of any cardiac stents or other reason for Plavix)  - resume ASA on Monday

## 2019-07-12 NOTE — DISCHARGE NOTE NURSING/CASE MANAGEMENT/SOCIAL WORK - NSDCPEPTSTRK_GEN_ALL_CORE
Prescribed medications/Need for follow up after discharge/Risk factors for stroke/Stroke warning signs and symptoms/Signs and symptoms of stroke/Stroke education booklet/Call 911 for stroke/Stroke support groups for patients, families, and friends

## 2019-07-13 VITALS
SYSTOLIC BLOOD PRESSURE: 171 MMHG | HEART RATE: 61 BPM | OXYGEN SATURATION: 99 % | TEMPERATURE: 98 F | DIASTOLIC BLOOD PRESSURE: 69 MMHG | RESPIRATION RATE: 18 BRPM

## 2019-07-13 LAB
GLUCOSE BLDC GLUCOMTR-MCNC: 201 MG/DL — HIGH (ref 70–99)
GLUCOSE BLDC GLUCOMTR-MCNC: 72 MG/DL — SIGNIFICANT CHANGE UP (ref 70–99)
HCT VFR BLD CALC: 33 % — LOW (ref 34.5–45)
HGB BLD-MCNC: 11.1 G/DL — LOW (ref 11.5–15.5)
MCHC RBC-ENTMCNC: 29.7 PG — SIGNIFICANT CHANGE UP (ref 27–34)
MCHC RBC-ENTMCNC: 33.6 % — SIGNIFICANT CHANGE UP (ref 32–36)
MCV RBC AUTO: 88.2 FL — SIGNIFICANT CHANGE UP (ref 80–100)
NRBC # FLD: 0 K/UL — SIGNIFICANT CHANGE UP (ref 0–0)
PLATELET # BLD AUTO: 154 K/UL — SIGNIFICANT CHANGE UP (ref 150–400)
PMV BLD: 10.3 FL — SIGNIFICANT CHANGE UP (ref 7–13)
RBC # BLD: 3.74 M/UL — LOW (ref 3.8–5.2)
RBC # FLD: 13.7 % — SIGNIFICANT CHANGE UP (ref 10.3–14.5)
WBC # BLD: 11.13 K/UL — HIGH (ref 3.8–10.5)
WBC # FLD AUTO: 11.13 K/UL — HIGH (ref 3.8–10.5)

## 2019-07-13 PROCEDURE — 99232 SBSQ HOSP IP/OBS MODERATE 35: CPT

## 2019-07-13 RX ORDER — CEPHALEXIN 500 MG
1 CAPSULE ORAL
Qty: 6 | Refills: 0
Start: 2019-07-13 | End: 2019-07-15

## 2019-07-13 RX ADMIN — Medication 2: at 12:26

## 2019-07-13 RX ADMIN — ATENOLOL 50 MILLIGRAM(S): 25 TABLET ORAL at 06:05

## 2019-07-13 RX ADMIN — SERTRALINE 100 MILLIGRAM(S): 25 TABLET, FILM COATED ORAL at 12:27

## 2019-07-13 RX ADMIN — PANTOPRAZOLE SODIUM 40 MILLIGRAM(S): 20 TABLET, DELAYED RELEASE ORAL at 06:05

## 2019-07-13 RX ADMIN — LISINOPRIL 40 MILLIGRAM(S): 2.5 TABLET ORAL at 06:05

## 2019-07-13 RX ADMIN — AMLODIPINE BESYLATE 10 MILLIGRAM(S): 2.5 TABLET ORAL at 06:05

## 2019-07-13 RX ADMIN — Medication 100 MILLIGRAM(S): at 06:05

## 2019-07-13 NOTE — PROGRESS NOTE ADULT - PROBLEM SELECTOR PLAN 2
Acute blood loss anemia d/t recent dental procedure or upper GI bleed   Patient received 2u PRBCs and 1u platelets - responded well  EGD with gastric ulcer and no stigmata of recent bleeding  ASA and Plavix held

## 2019-07-13 NOTE — PROGRESS NOTE ADULT - SUBJECTIVE AND OBJECTIVE BOX
Patient is a 68y old  Female who presents with a chief complaint of Weakness (12 Jul 2019 14:58)      SUBJECTIVE / OVERNIGHT EVENTS: No new complaints. No bleeding.       MEDICATIONS  (STANDING):  amLODIPine   Tablet 10 milliGRAM(s) Oral daily  ATENolol  Tablet 50 milliGRAM(s) Oral daily  atorvastatin 80 milliGRAM(s) Oral at bedtime  ceFAZolin   IVPB 1000 milliGRAM(s) IV Intermittent every 8 hours  ceFAZolin   IVPB      dextrose 5%. 1000 milliLiter(s) (50 mL/Hr) IV Continuous <Continuous>  dextrose 50% Injectable 12.5 Gram(s) IV Push once  dextrose 50% Injectable 25 Gram(s) IV Push once  dextrose 50% Injectable 25 Gram(s) IV Push once  donepezil 10 milliGRAM(s) Oral at bedtime  insulin glargine Injectable (LANTUS) 20 Unit(s) SubCutaneous at bedtime  insulin lispro (HumaLOG) corrective regimen sliding scale   SubCutaneous three times a day before meals  insulin lispro (HumaLOG) corrective regimen sliding scale   SubCutaneous at bedtime  lisinopril 40 milliGRAM(s) Oral daily  pantoprazole    Tablet 40 milliGRAM(s) Oral before breakfast  sertraline 100 milliGRAM(s) Oral daily    MEDICATIONS  (PRN):  ALBUTerol    90 MICROgram(s) HFA Inhaler 2 Puff(s) Inhalation every 6 hours PRN Shortness of Breath and/or Wheezing  dextrose 40% Gel 15 Gram(s) Oral once PRN Blood Glucose LESS THAN 70 milliGRAM(s)/deciliter  glucagon  Injectable 1 milliGRAM(s) IntraMuscular once PRN Glucose LESS THAN 70 milligrams/deciliter      Vital Signs Last 24 Hrs  T(C): 37.1 (13 Jul 2019 06:02), Max: 37.1 (13 Jul 2019 02:04)  T(F): 98.7 (13 Jul 2019 06:02), Max: 98.7 (13 Jul 2019 02:04)  HR: 59 (13 Jul 2019 06:02) (56 - 61)  BP: 147/58 (13 Jul 2019 06:02) (145/62 - 165/67)  BP(mean): --  RR: 17 (13 Jul 2019 06:02) (17 - 18)  SpO2: 96% (13 Jul 2019 06:02) (95% - 99%)  CAPILLARY BLOOD GLUCOSE      POCT Blood Glucose.: 72 mg/dL (13 Jul 2019 08:39)  POCT Blood Glucose.: 124 mg/dL (12 Jul 2019 22:20)  POCT Blood Glucose.: 168 mg/dL (12 Jul 2019 16:37)    I&O's Summary      PHYSICAL EXAM:  GENERAL: NAD, well-developed  HEAD:  Atraumatic, Normocephalic  EYES: EOMI, PERRLA, conjunctiva and sclera clear  NECK: Supple, No JVD  CHEST/LUNG: Clear to auscultation bilaterally; No wheeze  HEART: Regular rate and rhythm; No murmurs, rubs, or gallops  ABDOMEN: Soft, Nontender, Nondistended; Bowel sounds present  EXTREMITIES:  2+ Peripheral Pulses, No clubbing, cyanosis, or edema  PSYCH: AAOx3  NEUROLOGY: non-focal  SKIN: No rashes or lesions    LABS:                        11.1   11.13 )-----------( 154      ( 13 Jul 2019 05:46 )             33.0     07-12    141  |  103  |  10  ----------------------------<  102<H>  3.2<L>   |  29  |  0.76    Ca    8.9      12 Jul 2019 05:52  Phos  2.9     07-12  Mg     1.4     07-12    TPro  5.6<L>  /  Alb  3.3  /  TBili  0.9  /  DBili  x   /  AST  19  /  ALT  16  /  AlkPhos  132<H>  07-12              RADIOLOGY & ADDITIONAL TESTS:    Imaging Personally Reviewed:    Consultant(s) Notes Reviewed:      Care Discussed with Consultants/Other Providers:

## 2019-07-13 NOTE — PROGRESS NOTE ADULT - PROBLEM SELECTOR PLAN 5
ASA/plavix held - per recent guideline likely does not need Plavix any longer (daughter does not know of any cardiac stents or other reason for Plavix)  - resume ASA on Monday

## 2019-07-13 NOTE — PROGRESS NOTE ADULT - PROBLEM SELECTOR PLAN 3
s/p 2u PRBCs and 1u platelets, Occult positive  s/p EGD with gastric ulcer, no stigmata bleeding  c/w PPI   ASA and Plavix held

## 2019-07-13 NOTE — PROGRESS NOTE ADULT - PROBLEM SELECTOR PLAN 6
HgA1C is 8.4, FSBGs  Continue Lantus and Humalog with insulin sliding scale for coverage  - resume premeal on d/c if FSBGs >150s

## 2019-07-13 NOTE — PROGRESS NOTE ADULT - ASSESSMENT
68 year old Qatari speaking female with PMHx of CVA (left sided residual weakness), DM Type II, HTN, HLD, depression, dementia, presents to the ED for evaluation of weakness x 1 day with anemia likely secondary to blood loss in the setting of dental procedure

## 2019-07-13 NOTE — PROGRESS NOTE ADULT - PROBLEM SELECTOR PLAN 4
Near syncope in the setting of blood loss, Tele negative  echo can be done as outpt  Fall precautions, ambulate with assistance

## 2022-01-20 NOTE — PATIENT PROFILE ADULT - NSPROGENSOURCEINFO_GEN_A_NUR
Physical Therapy  Visit Type: initial evaluation  Precautions:  Medical precautions: ; standard precautions and droplet precautions.   Lines:     Basic: IV and O2 (1L at beginning of session but no O2 during session or after per RN recommendation)      Lines in chart and on patient reviewed, precautions maintained throughout session.  Safety Measures: bed alarm and chair alarm    SUBJECTIVE    Bailey RN, approved session  zeyad Mcguire aiddurga, present for session to assist with gathering items for session and assisting with lines.    Patient agreed to participate in therapy this date.  Pt. reports that she is doing okay.  Patient / Family Goal: return home and return to previous functional status    Pain     At onset of session (out of 10): 0  RN informed on pain level     OBJECTIVE     SpO2 at rest on 1L=94%.  RN recommending trialing activity without O2.  At rest on room air, SpO2 93% and with activity it ranged from 89-93%.  Level of consciousness: alert    Oriented to person, place and time     Arousal alertness: appropriate responses to stimuli    Affect/Behavior: alert, cooperative and pleasant  Patient activity tolerance: 1 to 1 activity to rest  Functional Communication/Cognition       Form of communication:  Verbal  Range of Motion (measured in degrees unless otherwise noted, active unless indicated)  WFL: RLE, LLE  Strength (out of 5 unless otherwise indicated)   Hip:  Hip Flexion: Left: 4- Right: 4-  Knee:   - Flexion:      • Left: 4      • Right: 4   - Extension:      • Left: 4      • Right: 4  Ankle:    - Dorsiflexion:      • Left: 4+      • Right: 4+  Balance (trials measured in sec unless otherwise indicted)    Sitting: Static: supervision double lower extremity support and back unsupported, Dynamic: supervision back unsupported and double lower extremity support    Standing - Firm Surface - Eyes Open: Static: contact guard/touching/steadying assist double upper extremity support  Dynamic: minimal assist  double upper extremity support  Balance Details: Bilateral UE support on jennifer 2 wheeled walker for static and dynamic standing balance.   Neurological Comments / Details: Pt. denies numbness and tingling in bilateral UEs and LEs.    Bed Mobility:        Supine to sit: stand by assist  Training completed:    Tasks: supine to sit    SBA for cues for sequencing.  HOB slightly elevated and pt using railing.   Transfers:    Assistive devices: gait belt and 2-wheeled walker (jennifer)    Sit to stand: minimal assist (minimal assist first trial, CGA second trial)    Stand to sit: contact guard/touching/steadying assist  Training completed:    Tasks: sit to stand and stand to sit    Minimal assist for steadying.  Cues for hand placements.   Gait/Ambulation:     Assistance: minimal assist (progressing to CGA throughout session)   Assistive device: gait belt and 2-wheeled walker (jennifer)    Distance (ft): 140    Pattern: step through    Type: decreased aurelia    Swing phase: Left: decreased step length; Right: decreased step length  Training Completed:    Tasks: gait training on level surfaces    Cues for pacing and breathing as pt coughing throughout walk.  Minimal assist initially for steadying but pt progressed to CGA.       Interventions     Training provided: bed mobility training, balance retraining, gait training, safety training, transfer training, activity tolerance, energy conservation and breathing/relaxation    Skilled input: Verbal instruction/cues  Verbal Consent: Writer verbally educated and received verbal consent for hand placement, positioning of patient, and techniques to be performed today from patient        ASSESSMENT    Impairments: strength, balance deficits, activity tolerance, endurance and shortness of breath  Functional Limitations: all functional mobility    Patient presents to physical therapy below baseline level of modified independent.  Pt. is demonstrating decreased strength, balance, and  activity tolerance, limited by shortness of breath and coughing, which are limiting the completion of all functional mobility at this time.  Further skilled physical therapy is required to address these limitations in attempt to maximize the patient's functional independence.   Recommend pt discharge home with home PT and have assist of son for a few days.  Pt. reports that son is retired and would be able to stay with pt if needed.  Therapist encouraged pt to reach out to son to see if he can stay with pt.         Discharge Recommendations  Recommendation for Discharge: PT WI: Home, Home therapy, 24 Hour assist (with pt reporting that her retired son can stay with her if needed initially)  PT/OT Mobility Equipment for Discharge: Jennifer 2 wheeled walker - pt owns              Skilled therapy is required to address these limitations in attempt to maximize the patient's independence.  Progress: progressing toward goals    Pain at end of session: RN informed on pain level 0/10  Predicted patient presentation: Moderate (evolving) - Patient comorbidities and complexities, as defined above, may have varying impact on steady progress for prescribed plan of care.    End of Session:   Location: in chair  Safety measures: call light within reach, lines intact and alarm system in place/re-engaged  Handoff to: nurse  Education Provided:   Learning assessment:  - Primary learner: patient  - Are they ready to learn: yes  - Preferred learning style: verbal  - Barriers to learning: no barriers apparent at this time  Education provided during session:  - Receiving education: patient  - Results of above outlined education: Verbalizes understanding    PLAN    Suggestions for next session as indicated:   progress ambulation distance with jennifer 2 wheeled walker, transfers, bed mobility and initiate 3 stairs with railing       PT Frequency: 4 days/week  Frequency Comments: 1          Interventions: bed mobility, gait training, stairs  retraining, strengthening, balance, patient/family training, safety education, functional transfer training and HEP train/position  Agreement to plan and goals: patient agrees with goals and treatment plan        GOALS  Review Date: 1/27/2022  Long Term Goals: (to be met by time of discharge from hospital)  Sit to supine: Patient will complete sit to supine modified independent.  Status: not met  Supine to sit: Patient will complete supine to sit modified independent.  Status: progressing/ongoing  Sit to stand: Patient will complete sit to stand transfer with 2-wheeled walker, modified independent.   Status: progressing/ongoing  Stand to sit: Patient will complete stand to sit transfer with 2-wheeled walker, modified independent.   Status: progressing/ongoing  Ambulation (even): Patient will ambulate on even surface for 150 feet with 2-wheeled walker, modified independent.   Status: progressing/ongoing  Stairs: Patient will ambulate 3 steps with using one rail, modified independent.  Status: not met    Patient will demonstrate and/or verbalize understanding of car transfer completion.  Not met.  Documented in the chart in the following areas: Assessment.      Therapy procedure time and total treatment time can be found documented on the Time Entry flowsheet   patient

## 2022-06-21 NOTE — DISCHARGE NOTE PROVIDER - CARE PROVIDER_API CALL
Dr. Ayala,   [facility]  Neurology  Phone: (   )    -  Fax: (   )    -  Follow Up Time: Surgeon/Pathologist Verbiage (Will Incorporate Name Of Surgeon From Intro If Not Blank): operated in two distinct and integrated capacities as the surgeon and pathologist.

## 2023-08-24 NOTE — CHART NOTE - NSCHARTNOTESELECT_GEN_ALL_CORE
Subjective:      Patient ID: Lorena Vivas is a 69 y.o. female.    Chief Complaint: Pain of the Left Hand      HPI  Lorena Vivas is a left hand dominant 69 y.o. female presenting today for left hand pain.  Onset of symptoms began about 6 months ago.  She describes it as burning 6/10 pain, currently a 4/10 at rest, is localized on the dorsal side between the thumb and index finger. She saw her neurologist who took an xray then referred her to Dr Wilcox.   Pain that goes from wrist radiated to back of index and thumb      Review of patient's allergies indicates:   Allergen Reactions    Opioids - morphine analogues Itching         Current Outpatient Medications   Medication Sig Dispense Refill    acetaminophen (TYLENOL) 500 MG tablet Take 2 tablets (1,000 mg total) by mouth every 8 (eight) hours as needed.  0    ALLERGY RELIEF, FEXOFENADINE, 180 mg tablet Take 180 mg by mouth.      amLODIPine (NORVASC) 5 MG tablet Take 1 tablet (5 mg total) by mouth once daily. 90 tablet 3    aspirin (ECOTRIN) 81 MG EC tablet Take 81 mg by mouth once daily. Stay on ASA per Dr Bo, Dr Vines staff aware. Pt called 5/28 and verbalized understanding.      atorvastatin (LIPITOR) 80 MG tablet Take 1 tablet (80 mg total) by mouth once daily. 90 tablet 3    betamethasone dipropionate 0.05 % cream Use bid 45 g 3    bumetanide (BUMEX) 0.5 MG Tab TAKE 1 TABLET BY MOUTH ONCE DAILY. 90 tablet 3    carvediloL (COREG) 12.5 MG tablet Take 1 tablet (12.5 mg total) by mouth 2 (two) times daily with meals. 180 tablet 1    cilostazoL (PLETAL) 100 MG Tab TAKE 1 TABLET BY MOUTH TWICE A  tablet 3    clopidogreL (PLAVIX) 75 mg tablet Take 75 mg by mouth once daily.      EScitalopram oxalate (LEXAPRO) 10 MG tablet Take 1 tablet daily 30 tablet 3    fluticasone propionate (FLONASE) 50 mcg/actuation nasal spray USE 1 SPRAY (50 MCG TOTAL) IN EACH NOSTRIL ONCE DAILY 16 mL 1    gabapentin (NEURONTIN) 300 MG capsule TAKE 1 CAPSULE BY  Event Note MOUTH EVERY EVENING 30 capsule 11    losartan (COZAAR) 25 MG tablet Take 1 tablet (25 mg total) by mouth once daily. 90 tablet 3    MAGNESIUM ORAL Take by mouth once daily.      NEXLETOL 180 mg Tab TAKE 1 TABLET BY MOUTH ONCE DAILY. 30 tablet 6    omega-3 fatty acids/fish oil (FISH OIL-OMEGA-3 FATTY ACIDS) 300-1,000 mg capsule Take by mouth once daily.      pantoprazole (PROTONIX) 40 MG tablet Take 1 tablet (40 mg total) by mouth once daily. 30 tablet 11     No current facility-administered medications for this visit.       Past Medical History:   Diagnosis Date    Alcohol dependence in early full remission     Alcohol dependence, daily use     Allergy     Anxiety     Arthritis     Back pain     BRCA1 negative     Breast cancer     dx in 2000    Cancer 2000    stage I IDC right breast    Cataract     Coronary artery disease     Depression     GERD (gastroesophageal reflux disease)     History of alcohol abuse     History of breast cancer, right 2000 10/31/2016    Stage I carcinoma right breast 2000, lumpectomy with negative AND, adjuvant XRT and five years of tamoxifen, followed by Dr Bethea at [facility] Left breast reduction and revision 6/2016     Hypertension     Macular degeneration     Mixed hyperlipidemia 03/20/2019    Neuromuscular disorder     Obesity     Osteoporosis     Panic attacks 6/13/2018    Positive cardiac stress test 4/30/2021    Quit smoking, 2011 12/15/2016    Status post insertion of drug-eluting stent into left anterior descending (LAD) artery 5/6/2021    Trouble in sleeping        Past Surgical History:   Procedure Laterality Date    ANGIOGRAM, CORONARY, WITH LEFT HEART CATHETERIZATION Left 04/30/2021    Procedure: Left heart cath;  Surgeon: Thang Lamb MD;  Location: [facility] CATH LAB;  Service: Cardiology;  Laterality: Left;    BREAST LUMPECTOMY Right     BREAST SURGERY Right 2000    COLONOSCOPY N/A 07/16/2020    Procedure: COLONOSCOPY;  Surgeon: aKtty Hagen MD;  Location: [facility] ENDO [facility]  "FLR);  Service: Endoscopy;  Laterality: N/A;  Holding Pletal for 2 days prior to proc. per Dr. Urrutia. No visitor policy discussed. Covid test scheduled for 7/13.EC    COLONOSCOPY N/A 5/15/2023    Procedure: COLONOSCOPY;  Surgeon: Katty Hagen MD;  Location: [facility] (4TH FLR);  Service: Endoscopy;  Laterality: N/A;  paruch only-suprep-inst portal-ok to hold pletal and plavix see te 4/17/23-tb    EPIDURAL STEROID INJECTION N/A 11/23/2020    Procedure: CAUDAL JUAN DIRECT REFERRAL PT STATED SHE DOES NOT TAKE PLETAL;  Surgeon: Marciano Garay MD;  Location: [facility] PAIN MGT;  Service: Pain Management;  Laterality: N/A;  NEEDS CONSENT    ESOPHAGOGASTRODUODENOSCOPY N/A 06/22/2022    Procedure: EGD (ESOPHAGOGASTRODUODENOSCOPY);  Surgeon: Juan Muñoz MD;  Location: [facility] (4TH FLR);  Service: Endoscopy;  Laterality: N/A;  fully vaccinated  ok to hold Plavix and Pletal-see telephone encounters dated 6/2-MS  instructions mailed    HYSTERECTOMY  06/2012    complete    INJECTION OF ANESTHETIC AGENT AROUND NERVE Left 03/29/2021    Procedure: BLOCK, NERVE, OBTURATOR AND FEMORAL;  Surgeon: Marciano Garay MD;  Location: [facility] PAIN MGT;  Service: Pain Management;  Laterality: Left;  oK for pletal x3 days    OOPHORECTOMY      ROTATOR CUFF REPAIR Left 2013    TONSILLECTOMY      TONSILLECTOMY      TOTAL REDUCTION MAMMOPLASTY Left        Review of Systems:  Constitutional: Negative for chills and fever.   Respiratory: Negative for cough and shortness of breath.    Gastrointestinal: Negative for nausea and vomiting.   Skin: Negative for rash.   Neurological: Negative for dizziness and headaches.   Psychiatric/Behavioral: Negative for depression.   MSK as in HPI       OBJECTIVE:     PHYSICAL EXAM:  Ht 5' 6" (1.676 m)   Wt 91.5 kg (201 lb 11.5 oz)   BMI 32.56 kg/m²     GEN:  NAD, well-developed, well-groomed.  NEURO: Awake, alert, and oriented. Normal attention and concentration.    PSYCH: Normal mood and affect. Behavior is " normal.  HEENT: No cervical lymphadenopathy noted.  CARDIOVASCULAR: Radial pulses 2+ bilaterally. No LE edema noted.  PULMONARY: Breath sounds normal. No respiratory distress.  SKIN: Intact, no rashes.      MSK:     RUE:  Good active ROM of the wrist and fingers. AIN/PIN/Radial/Median/Ulnar Nerves assessed in isolation without deficit. Radial & Ulnar arteries palpated 2+. Capillary Refill <3s.    LUE:  Good active ROM of the wrist and fingers. AIN/PIN/Radial/Median/Ulnar Nerves assessed in isolation without deficit. Radial & Ulnar arteries palpated 2+. Capillary Refill <3s.  Burning and tender to the tough over the MCP joint index finger  + tinel's over radial sensory nerve  -Finkelstain's, watch over wrist  Neg cubital tunnel  + swelling over index MCP joint      RADIOGRAPHS:  Questionable widening between the scaphoid and lunate on the AP projection may represent ligamentous disruption.  Correlation with clinical findings and additional evaluation if indicated  Comments: I have personally reviewed the imaging and I agree with the above radiologist's report.    ASSESSMENT/PLAN:       ICD-10-CM ICD-9-CM   1. Finger joint swelling, left  M25.442 719.04   2. Disorder of ligament of left hand  M24.242 728.4          No orders of the defined types were placed in this encounter.       Plan:     1) radial sensory neuralgia- move watch, injection today  2) index MCP- paraffin, voltaren, anti-inflammtory diet        The patient indicates understanding of these issues and agrees to the plan.    Leighann Keys ATC, [facility]   [facility]  [place], [place]

## 2023-12-19 NOTE — PATIENT PROFILE ADULT - FUNCTIONAL SCREEN CURRENT LEVEL: SWALLOWING (IF SCORE 2 OR MORE FOR ANY ITEM, CONSULT REHAB SERVICES), MLM)
"2023       RE: Joseph العلي  [address] [facility] [address]     Dear Colleague,    Thank you for referring your patient, Joseph العلي, to the [facility]. Please see a copy of my visit note below.    72 minutes spent by me on the date of the encounter doing chart review, history and exam, documentation and further activities per the note    New Medical Weight Management Consult    PATIENT:  Joseph العلي  MRN:         [id number]  :         1990  MINAL:         2023    Dear Primary Care Provider,    I had the pleasure of seeing your patient, Joseph العلي. Full intake/assessment was done to determine barriers to weight loss success and develop a treatment plan. Joseph العلي is a 33 year old male interested in treatment of medical problems associated with excess weight. He has a height of 6' 0\", a weight of 256 lbs 0 oz, and the calculated Body mass index is 34.72 kg/m .            Assessment & Plan  Problem List Items Addressed This Visit       Class 1 obesity with serious comorbidity and body mass index (BMI) of 34.0 to 34.9 in adult, unspecified obesity type - Primary    Relevant Medications    tirzepatide-Weight Management (ZEPBOUND) 2.5 MG/0.5ML prefilled pen    tirzepatide-Weight Management (ZEPBOUND) 5 MG/0.5ML prefilled pen    Other Relevant Orders    CBC with platelets    Comprehensive metabolic panel    Vitamin D Deficiency    Hypertriglyceridemia    Overweight onset in high school, weighed around 170 in high school. Weight gain has been gradual. Was able to lose from 210 to 180 around age 23 with skipping meals, but this was not sustainable and has since regained and has continued to regain to 271lbs 10 months ago which was his highest weight in life. Has since lost weight down to 156 by with diet changes but he has plateaued and has been struggling to keep up diet " changes.      Regarding eating: is able to get full, is able to stay full, craves salty foods/meat/hamburgers, emotionally eats, stress eats. Sometimes will eat to the place where he feels uncomfortably full.   Has 2-3 meals a day, breakfast and lunch are small, dinner is generally really big and he will continue to snack until bed time.     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 When not doing this job, he uses pel[employer] 2-3 times a week.   Goals with our clinic: manage weight sustainably to improve blood sugar, improve lipid labs.   He has not taken weight loss medication before.   Of note, his wife has seen great weight loss with wegovy, he is interested in starting a GLP-1 today as a tool for managing his cravings, his appetite, and in reducing snacking.       Plan  Start Zepbound 2.5mg once weekly for 4 weeks, then increase to 5mg once weekly.  If zepbound denied, okay to start saxenda as alternative   Goals we discussed today: cutting down on daily alcohol, adding weight training in each week   Labs ordered today: can get these done at any [place] lab  Follow up with Joana in 3 months   Dietician appointment today     Medication prescribed   ZEPBOUND  Not concurrently taking a different GLP-1   No history of pancreatitis   No personal or family history of medullary thyroid carcinoma  No personal or family history of Multiple Endocrine Neoplasia Type 2  .    Alternative Medications discussed   SAXENDA  Not concurrently taking a different GLP-1   No history of pancreatitis   No personal or family history of medullary thyroid carcinoma  No personal or family history of Multiple Endocrine Neoplasia Type 2  .    TOPIRAMATE  No history of kidney stones  No history of glaucoma   No issues with memory  Caution would be needed with this medication due to depression managed by  "psychiatry, would need to monitor closely   .    Relatively contraindicated medications   PHENTERMINE  Issues with anxiety   History of cardiac arrhythmias remote hx of SVT and currently drinks multiple energy drinks daily, may consider with cardiology consult   .  NALTREXONE  Frequent alcohol use   .  BUPROPION  Cardiac arrhythmias remote hx of SVT, currently drinks multiple energy drinks daily, also is on successful mental health regimen.    Issues with anxiety, would need to speak with psychiatry   .       Joseph العلي is a 33 year old male who presents to clinic today for the following health issues.     He has the following co-morbidities:        12/19/2023    10:19 AM   --   I have the following health issues associated with obesity Pre-Diabetes   I have the following symptoms associated with obesity Depression    Back Pain    Fatigue    Hip Pain      Triglycerides 2/28/2023: 423            No data to display                    12/19/2023    10:19 AM   Referring Provider   Please name the provider who referred you to Medical Weight Management  If you do not know, please answer \"I Don't Know\" I don't know       Overweight onset in high school, weighed around 170 in high school. Weight gain has been gradual. Was able to lose from 210 to 180 around age 23 with skipping meals, but this was not sustainable and has since regained and has continued to regain to 271lbs 10 months ago which was his highest weight in life. Has since lost weight down to 156 by with diet changes but these have been unsustainable.      Was able to cut out beer to lose weight, has since started drinking on average 9 beers a week.     Goals with our clinic: manage weight sustainably to improve blood sugar, improve lipid labs.     Past/ Current AOMs: none     Wife has seen great weight loss with wegovy.         Regarding eating: is able to get full, is able to stay full, craves salty foods/meat/hamburgers, emotionally eats, stress eats. " Sometimes will eat to the place where he feels uncomfortably full.     Has 2-3 meals a day, breakfast and lunch are small, dinner is generally really big and he will continue to snack until bed time.     Activity: works part time for tree service in the summer. Uses [employer] 2-3 times a week. Works nights as an ED tech, is on feet for that.             12/19/2023    10:19 AM   Weight History   How concerned are you about your weight? Very Concerned   I became overweight As a Teenager   The following factors have contributed to my weight gain Mental Health Issues    After Quitting Smoking    Eating Wrong Types of Food    Eating Too Much    Stress   I have tried the following methods to lose weight Watching Portions or Calories    Exercise    Meal Replacements   My lowest weight since age 16 was 165   My highest weight since age 16 was 271   The most weight I have ever lost was (lbs) 30   I have the following family history of obesity/being overweight My mother is overweight    My father is overweight   How has your weight changed over the last year? Gained           12/19/2023    10:19 AM   Diet Recall Review with Patient   If you do eat supper, what types of food do you typically eat? Pastas. Red meat   If you do snack, what types of food do you typically eat? Breads   How many glasses of juice do you drink in a typical day? 0   How many of glasses of milk do you drink in a typical day? 0   How many 8oz glasses of sugar containing drinks such as Solo-Aid/sweet tea do you drink in a day? 0   How many cans/bottles of sugar pop/soda/tea/sports drinks do you drink in a day? 0   How many cans/bottles of diet pop/soda/tea or sports drink do you drink in a day? 0   How often do you have a drink of alcohol? Daily   If you do drink, how many drinks might you have in a day? 3-4   Eats really light breakfast and lunch, feel that he overeats for his last meal (dinner on days he doesn't work, mornings on nights he does work).          12/19/2023    10:19 AM   Eating Habits   Generally, my meals include foods like these bread, pasta, rice, potatoes, corn, crackers, sweet dessert, pop, or juice A Few Times a Week   Generally, my meals include foods like these fried meats, brats, burgers, french fries, pizza, cheese, chips, or ice cream A Few Times a Week   Eat fast food (like McDonalds, Burger Kal, Taco Bell) Once a Week   Eat at a buffet or sit-down restaurant Less Than Weekly   Eat most of my meals in front of the TV or computer Once a Week   Often skip meals, eat at random times, have no regular eating times A Few Times a Week   Rarely sit down for a meal but snack or graze throughout A Few Times a Week   Eat extra snacks between meals A Few Times a Week   Eat most of my food at the end of the day Almost Everyday   Eat in the middle of the night or wake up at night to eat A Few Times a Week   Eat extra snacks to prevent or correct low blood sugar Never   Eat to prevent acid reflux or stomach pain Never   Worry about not having enough food to eat Never   I eat when I am depressed A Few Times a Week   I eat when I am stressed A Few Times a Week   I eat when I am bored A Few Times a Week   I eat when I am anxious Never   I eat when I am happy or as a reward A Few Times a Week   I feel hungry all the time even if I just have eaten Almost Everyday   Feeling full is important to me Everyday   I finish all the food on my plate even if I am already full Almost Everyday   I can't resist eating delicious food or walk past the good food/smell Less Than Weekly   I eat/snack without noticing that I am eating A Few Times a Week   I eat when I am preparing the meal Once a Week   I eat more than usual when I see others eating Never   I have trouble not eating sweets, ice cream, cookies, or chips if they are around the house Less Than Weekly   I think about food all day Almost Everyday   What foods, if any, do you crave? Cheese           12/19/2023     10:19 AM   Amount of Food   I feel out of control when eating Monthly   I eat a large amount of food, like a loaf of bread, a box of cookies, a pint/quart of ice cream, all at once Never   I eat a large amount of food even when I am not hungry Monthly   I eat rapidly Weekly   I eat alone because I feel embarrassed and do not want others to see how much I have eaten Weekly   I eat until I am uncomfortably full Weekly   I feel bad, disgusted, or guilty after I overeat Almost Everyday           12/19/2023    10:19 AM   Activity/Exercise History   How much of a typical 12 hour day do you spend sitting? Less Than Half the Day   How much of a typical 12 hour day do you spend lying down? Less Than Half the Day   How much of a typical day do you spend walking/standing? Most of the Day   How many hours (not including work) do you spend on the TV/Video Games/Computer/Tablet/Phone? 2-3 Hours   How many times a week are you active for the purpose of exercise? 2-3 Times a Week   What keeps you from being more active? Lack of Time    Unsure What To Do   How many total minutes do you spend doing some activity for the purpose of exercising when you exercise? 15-30 Minutes       PAST MEDICAL HISTORY:  No past medical history on file.        12/19/2023    10:19 AM   Work/Social History Reviewed With Patient   My employment status is Part-Time   My job is EMT   How much of your job is spent on the computer or phone? 50%   How many hours do you spend commuting to work daily? 1.5   What is your marital status? /In a Relationship   If in a relationship, is your significant other overweight? No   If you have children, are they overweight? No   Who do you live with? My wife and children   Who does the food shopping? Myself and Wife       Marijuana use: used in the past, has stopped mostly for a few months. Still takes occasional gummies.    Alcohol use 9 beers daily   Caffeine use: 0 sugar energy drinks, coffee              12/19/2023    10:19 AM   Mental Health History Reviewed With Patient   Have you ever been physically or sexually abused? No   How often in the past 2 weeks have you felt little interest or pleasure in doing things? For Several Days   Over the past 2 weeks how often have you felt down, depressed, or hopeless? For Several Days   Mental health managed by psychiatrist         12/19/2023    10:19 AM   Sleep History Reviewed With Patient   How many hours do you sleep at night? 6   Works nights, sleeps during day. Has 4 kids. Sleep isn't consistent.       MEDICATIONS:   Current Outpatient Medications   Medication Sig Dispense Refill    busPIRone (BUSPAR) 15 MG tablet Take 15 mg by mouth 2 times daily      sertraline (ZOLOFT) 100 MG tablet Take 150 mg by mouth daily      tirzepatide-Weight Management (ZEPBOUND) 2.5 MG/0.5ML prefilled pen Inject 0.5 mLs (2.5 mg) Subcutaneous every 7 days For 4 weeks 2 mL 0    tirzepatide-Weight Management (ZEPBOUND) 5 MG/0.5ML prefilled pen Inject 0.5 mLs (5 mg) Subcutaneous every 7 days After completing 2.5mg dose for 4 weeks, as long as no intolerable side effects. 2 mL 2           ALLERGIES:   No Known Allergies         No data to display                        Objective   Ht 1.829 m (6')   Wt 116.1 kg (256 lb)   BMI 34.72 kg/m    Vitals - Patient Reported  Pain Score: No Pain (0)      Vitals:  No vitals were obtained today due to virtual visit.    Physical Exam   GENERAL: Healthy, alert and no distress  EYES: Eyes grossly normal to inspection.  No discharge or erythema, or obvious scleral/conjunctival abnormalities.  RESP: No audible wheeze, cough, or visible cyanosis.  No visible retractions or increased work of breathing.    SKIN: Visible skin clear. No significant rash, abnormal pigmentation or lesions.  NEURO: Cranial nerves grossly intact.  Mentation and speech appropriate for age.  PSYCH: Mentation appears normal, affect normal/bright, judgement and insight intact, normal speech  and appearance well-groomed.     Anti-obesity medication ROS:    HEENT  Hx of glaucoma: No    Cardiovascular  CAD:Yes History of SVT and tachycardia, he suspects it was related to too much caffeine use. No issues since.  HTN:No      Gastrointestinal  GERD:No  Constipation:No  Liver Dz:No  H/O Pancreatitis:No    Psychiatric  Bipolar: No  Anxiety:Yes  Depression:Yes  History of alcohol/drug abuse: Yes History of frequent alcohol and marijauna use, has been able to cut down  Hx of eating disorder:No    Endocrine  Personal or family hx of MTC or MEN2:No  Diabetes/prediabetes: Yes prediabetes     Neurologic:  Hx of seizures: No  Hx of migraines: Yes Migraines in high school   Memory Impairment: No  CVA history: No      History of kidney stones: No  Kidney disease: No      Taking Opioid/Narcotic: No        Sincerely,    Joana Arnold PA-C       Attestation signed by Karishma Michaud NP at 12/21/2023  6:20 AM:  I have personally seen and examined patient with Joana Arnold PA-C and I agree with findings, assessments and plan as documented.    Karishma Michaud NP          Virtual Visit Details    Type of service:  Video Visit     Originating Location (pt. Location): Home    Distant Location (provider location):  On-site  Platform used for Video Visit: Pascual     0 = swallows foods/liquids without difficulty

## 2024-05-05 ENCOUNTER — INPATIENT (INPATIENT)
Facility: HOSPITAL | Age: 74
LOS: 0 days | DRG: 872 | End: 2024-05-06
Attending: STUDENT IN AN ORGANIZED HEALTH CARE EDUCATION/TRAINING PROGRAM | Admitting: STUDENT IN AN ORGANIZED HEALTH CARE EDUCATION/TRAINING PROGRAM
Payer: COMMERCIAL

## 2024-05-05 VITALS
HEART RATE: 100 BPM | RESPIRATION RATE: 20 BRPM | TEMPERATURE: 98 F | OXYGEN SATURATION: 97 % | SYSTOLIC BLOOD PRESSURE: 61 MMHG | DIASTOLIC BLOOD PRESSURE: 42 MMHG

## 2024-05-05 DIAGNOSIS — A41.9 SEPSIS, UNSPECIFIED ORGANISM: ICD-10-CM

## 2024-05-05 DIAGNOSIS — Z98.891 HISTORY OF UTERINE SCAR FROM PREVIOUS SURGERY: Chronic | ICD-10-CM

## 2024-05-05 DIAGNOSIS — N17.9 ACUTE KIDNEY FAILURE, UNSPECIFIED: ICD-10-CM

## 2024-05-05 DIAGNOSIS — E87.20 ACIDOSIS, UNSPECIFIED: ICD-10-CM

## 2024-05-05 DIAGNOSIS — Z90.49 ACQUIRED ABSENCE OF OTHER SPECIFIED PARTS OF DIGESTIVE TRACT: Chronic | ICD-10-CM

## 2024-05-05 DIAGNOSIS — Z87.19 PERSONAL HISTORY OF OTHER DISEASES OF THE DIGESTIVE SYSTEM: Chronic | ICD-10-CM

## 2024-05-05 LAB
ADD ON TEST-SPECIMEN IN LAB: SIGNIFICANT CHANGE UP
ADD ON TEST-SPECIMEN IN LAB: SIGNIFICANT CHANGE UP
ALBUMIN SERPL ELPH-MCNC: 2.2 G/DL — LOW (ref 3.3–5)
ALBUMIN SERPL ELPH-MCNC: 2.8 G/DL — LOW (ref 3.3–5)
ALBUMIN SERPL ELPH-MCNC: 2.9 G/DL — LOW (ref 3.3–5)
ALBUMIN SERPL ELPH-MCNC: 3 G/DL — LOW (ref 3.3–5)
ALP SERPL-CCNC: 270 U/L — HIGH (ref 40–120)
ALP SERPL-CCNC: 282 U/L — HIGH (ref 40–120)
ALP SERPL-CCNC: 328 U/L — HIGH (ref 40–120)
ALP SERPL-CCNC: 583 U/L — HIGH (ref 40–120)
ALT FLD-CCNC: 32 U/L — SIGNIFICANT CHANGE UP (ref 10–45)
ALT FLD-CCNC: 32 U/L — SIGNIFICANT CHANGE UP (ref 10–45)
ALT FLD-CCNC: 34 U/L — SIGNIFICANT CHANGE UP (ref 10–45)
ALT FLD-CCNC: 37 U/L — SIGNIFICANT CHANGE UP (ref 10–45)
ANION GAP SERPL CALC-SCNC: 23 MMOL/L — HIGH (ref 5–17)
ANION GAP SERPL CALC-SCNC: 24 MMOL/L — HIGH (ref 5–17)
ANION GAP SERPL CALC-SCNC: 27 MMOL/L — HIGH (ref 5–17)
ANION GAP SERPL CALC-SCNC: 28 MMOL/L — HIGH (ref 5–17)
APPEARANCE UR: ABNORMAL
APTT BLD: 33.4 SEC — SIGNIFICANT CHANGE UP (ref 24.5–35.6)
APTT BLD: 34.8 SEC — SIGNIFICANT CHANGE UP (ref 24.5–35.6)
AST SERPL-CCNC: 53 U/L — HIGH (ref 10–40)
AST SERPL-CCNC: 54 U/L — HIGH (ref 10–40)
AST SERPL-CCNC: 54 U/L — HIGH (ref 10–40)
AST SERPL-CCNC: 66 U/L — HIGH (ref 10–40)
BACTERIA # UR AUTO: ABNORMAL /HPF
BASE EXCESS BLDV CALC-SCNC: -14.2 MMOL/L — LOW (ref -2–3)
BASE EXCESS BLDV CALC-SCNC: -17.9 MMOL/L — LOW (ref -2–3)
BASE EXCESS BLDV CALC-SCNC: -9.4 MMOL/L — LOW (ref -2–3)
BASOPHILS # BLD AUTO: 0 K/UL — SIGNIFICANT CHANGE UP (ref 0–0.2)
BASOPHILS # BLD AUTO: 0.05 K/UL — SIGNIFICANT CHANGE UP (ref 0–0.2)
BASOPHILS NFR BLD AUTO: 0 % — SIGNIFICANT CHANGE UP (ref 0–2)
BASOPHILS NFR BLD AUTO: 0.9 % — SIGNIFICANT CHANGE UP (ref 0–2)
BILIRUB SERPL-MCNC: 3.6 MG/DL — HIGH (ref 0.2–1.2)
BILIRUB SERPL-MCNC: 3.7 MG/DL — HIGH (ref 0.2–1.2)
BILIRUB SERPL-MCNC: 3.8 MG/DL — HIGH (ref 0.2–1.2)
BILIRUB SERPL-MCNC: 3.9 MG/DL — HIGH (ref 0.2–1.2)
BILIRUB UR-MCNC: ABNORMAL
BLD GP AB SCN SERPL QL: NEGATIVE — SIGNIFICANT CHANGE UP
BUN SERPL-MCNC: 33 MG/DL — HIGH (ref 7–23)
BUN SERPL-MCNC: 36 MG/DL — HIGH (ref 7–23)
BUN SERPL-MCNC: 36 MG/DL — HIGH (ref 7–23)
BUN SERPL-MCNC: 38 MG/DL — HIGH (ref 7–23)
BURR CELLS BLD QL SMEAR: PRESENT — SIGNIFICANT CHANGE UP
BURR CELLS BLD QL SMEAR: SIGNIFICANT CHANGE UP
CA-I SERPL-SCNC: 1.02 MMOL/L — LOW (ref 1.15–1.33)
CA-I SERPL-SCNC: 1.09 MMOL/L — LOW (ref 1.15–1.33)
CA-I SERPL-SCNC: 1.14 MMOL/L — LOW (ref 1.15–1.33)
CALCIUM SERPL-MCNC: 7.6 MG/DL — LOW (ref 8.4–10.5)
CALCIUM SERPL-MCNC: 7.8 MG/DL — LOW (ref 8.4–10.5)
CALCIUM SERPL-MCNC: 7.8 MG/DL — LOW (ref 8.4–10.5)
CALCIUM SERPL-MCNC: 8.6 MG/DL — SIGNIFICANT CHANGE UP (ref 8.4–10.5)
CAST: 26 /LPF — HIGH (ref 0–4)
CHLORIDE BLDV-SCNC: 102 MMOL/L — SIGNIFICANT CHANGE UP (ref 96–108)
CHLORIDE BLDV-SCNC: 95 MMOL/L — LOW (ref 96–108)
CHLORIDE BLDV-SCNC: 97 MMOL/L — SIGNIFICANT CHANGE UP (ref 96–108)
CHLORIDE SERPL-SCNC: 96 MMOL/L — SIGNIFICANT CHANGE UP (ref 96–108)
CHLORIDE SERPL-SCNC: 97 MMOL/L — SIGNIFICANT CHANGE UP (ref 96–108)
CHLORIDE SERPL-SCNC: 97 MMOL/L — SIGNIFICANT CHANGE UP (ref 96–108)
CHLORIDE SERPL-SCNC: 99 MMOL/L — SIGNIFICANT CHANGE UP (ref 96–108)
CK MB BLD-MCNC: 3.2 % — SIGNIFICANT CHANGE UP (ref 0–3.5)
CK MB CFR SERPL CALC: 7.4 NG/ML — HIGH (ref 0–3.8)
CK SERPL-CCNC: 183 U/L — HIGH (ref 25–170)
CK SERPL-CCNC: 234 U/L — HIGH (ref 25–170)
CO2 BLDV-SCNC: 12 MMOL/L — LOW (ref 22–26)
CO2 BLDV-SCNC: 13 MMOL/L — LOW (ref 22–26)
CO2 BLDV-SCNC: 19 MMOL/L — LOW (ref 22–26)
CO2 SERPL-SCNC: 12 MMOL/L — LOW (ref 22–31)
CO2 SERPL-SCNC: 16 MMOL/L — LOW (ref 22–31)
CO2 SERPL-SCNC: <10 MMOL/L — CRITICAL LOW (ref 22–31)
CO2 SERPL-SCNC: <10 MMOL/L — CRITICAL LOW (ref 22–31)
COLOR SPEC: SIGNIFICANT CHANGE UP
CREAT ?TM UR-MCNC: 91 MG/DL — SIGNIFICANT CHANGE UP
CREAT SERPL-MCNC: 2.57 MG/DL — HIGH (ref 0.5–1.3)
CREAT SERPL-MCNC: 2.73 MG/DL — HIGH (ref 0.5–1.3)
CREAT SERPL-MCNC: 2.84 MG/DL — HIGH (ref 0.5–1.3)
CREAT SERPL-MCNC: 2.97 MG/DL — HIGH (ref 0.5–1.3)
D DIMER BLD IA.RAPID-MCNC: HIGH NG/ML DDU
DIFF PNL FLD: ABNORMAL
EGFR: 16 ML/MIN/1.73M2 — LOW
EGFR: 17 ML/MIN/1.73M2 — LOW
EGFR: 18 ML/MIN/1.73M2 — LOW
EGFR: 19 ML/MIN/1.73M2 — LOW
ELLIPTOCYTES BLD QL SMEAR: SLIGHT — SIGNIFICANT CHANGE UP
ELLIPTOCYTES BLD QL SMEAR: SLIGHT — SIGNIFICANT CHANGE UP
EOSINOPHIL # BLD AUTO: 0.05 K/UL — SIGNIFICANT CHANGE UP (ref 0–0.5)
EOSINOPHIL # BLD AUTO: 0.53 K/UL — HIGH (ref 0–0.5)
EOSINOPHIL NFR BLD AUTO: 0.9 % — SIGNIFICANT CHANGE UP (ref 0–6)
EOSINOPHIL NFR BLD AUTO: 1.7 % — SIGNIFICANT CHANGE UP (ref 0–6)
FIBRINOGEN PPP-MCNC: 370 MG/DL — SIGNIFICANT CHANGE UP (ref 200–445)
FLUAV AG NPH QL: SIGNIFICANT CHANGE UP
FLUBV AG NPH QL: SIGNIFICANT CHANGE UP
GAS PNL BLDA: SIGNIFICANT CHANGE UP
GAS PNL BLDA: SIGNIFICANT CHANGE UP
GAS PNL BLDV: 129 MMOL/L — LOW (ref 136–145)
GAS PNL BLDV: 130 MMOL/L — LOW (ref 136–145)
GAS PNL BLDV: 132 MMOL/L — LOW (ref 136–145)
GAS PNL BLDV: SIGNIFICANT CHANGE UP
GIANT PLATELETS BLD QL SMEAR: PRESENT — SIGNIFICANT CHANGE UP
GLUCOSE BLDC GLUCOMTR-MCNC: 234 MG/DL — HIGH (ref 70–99)
GLUCOSE BLDC GLUCOMTR-MCNC: 296 MG/DL — HIGH (ref 70–99)
GLUCOSE BLDV-MCNC: 222 MG/DL — HIGH (ref 70–99)
GLUCOSE BLDV-MCNC: 222 MG/DL — HIGH (ref 70–99)
GLUCOSE BLDV-MCNC: 292 MG/DL — HIGH (ref 70–99)
GLUCOSE SERPL-MCNC: 228 MG/DL — HIGH (ref 70–99)
GLUCOSE SERPL-MCNC: 229 MG/DL — HIGH (ref 70–99)
GLUCOSE SERPL-MCNC: 254 MG/DL — HIGH (ref 70–99)
GLUCOSE SERPL-MCNC: 280 MG/DL — HIGH (ref 70–99)
GLUCOSE UR QL: NEGATIVE MG/DL — SIGNIFICANT CHANGE UP
HCO3 BLDV-SCNC: 11 MMOL/L — LOW (ref 22–29)
HCO3 BLDV-SCNC: 12 MMOL/L — LOW (ref 22–29)
HCO3 BLDV-SCNC: 18 MMOL/L — LOW (ref 22–29)
HCT VFR BLD CALC: 30.2 % — LOW (ref 34.5–45)
HCT VFR BLD CALC: 32.1 % — LOW (ref 34.5–45)
HCT VFR BLD CALC: 33.1 % — LOW (ref 34.5–45)
HCT VFR BLDA CALC: 21 % — CRITICAL LOW (ref 34.5–46.5)
HCT VFR BLDA CALC: 32 % — LOW (ref 34.5–46.5)
HCT VFR BLDA CALC: 35 % — SIGNIFICANT CHANGE UP (ref 34.5–46.5)
HGB BLD CALC-MCNC: 10.6 G/DL — LOW (ref 11.7–16.1)
HGB BLD CALC-MCNC: 11.8 G/DL — SIGNIFICANT CHANGE UP (ref 11.7–16.1)
HGB BLD CALC-MCNC: 7.1 G/DL — LOW (ref 11.7–16.1)
HGB BLD-MCNC: 10.3 G/DL — LOW (ref 11.5–15.5)
HGB BLD-MCNC: 11.2 G/DL — LOW (ref 11.5–15.5)
HGB BLD-MCNC: 9.9 G/DL — LOW (ref 11.5–15.5)
INR BLD: 1.53 RATIO — HIGH (ref 0.85–1.18)
INR BLD: 1.84 RATIO — HIGH (ref 0.85–1.18)
KETONES UR-MCNC: NEGATIVE MG/DL — SIGNIFICANT CHANGE UP
LACTATE BLDV-MCNC: 7 MMOL/L — CRITICAL HIGH (ref 0.5–2)
LACTATE BLDV-MCNC: 7.7 MMOL/L — CRITICAL HIGH (ref 0.5–2)
LACTATE BLDV-MCNC: 9.7 MMOL/L — CRITICAL HIGH (ref 0.5–2)
LEUKOCYTE ESTERASE UR-ACNC: ABNORMAL
LIDOCAIN IGE QN: 17 U/L — SIGNIFICANT CHANGE UP (ref 7–60)
LYMPHOCYTES # BLD AUTO: 0.76 K/UL — LOW (ref 1–3.3)
LYMPHOCYTES # BLD AUTO: 15 % — SIGNIFICANT CHANGE UP (ref 13–44)
LYMPHOCYTES # BLD AUTO: 2.63 K/UL — SIGNIFICANT CHANGE UP (ref 1–3.3)
LYMPHOCYTES # BLD AUTO: 8.4 % — LOW (ref 13–44)
MAGNESIUM SERPL-MCNC: 1 MG/DL — CRITICAL LOW (ref 1.6–2.6)
MANUAL SMEAR VERIFICATION: SIGNIFICANT CHANGE UP
MANUAL SMEAR VERIFICATION: SIGNIFICANT CHANGE UP
MCHC RBC-ENTMCNC: 28.3 PG — SIGNIFICANT CHANGE UP (ref 27–34)
MCHC RBC-ENTMCNC: 28.3 PG — SIGNIFICANT CHANGE UP (ref 27–34)
MCHC RBC-ENTMCNC: 28.7 PG — SIGNIFICANT CHANGE UP (ref 27–34)
MCHC RBC-ENTMCNC: 32.1 GM/DL — SIGNIFICANT CHANGE UP (ref 32–36)
MCHC RBC-ENTMCNC: 32.8 GM/DL — SIGNIFICANT CHANGE UP (ref 32–36)
MCHC RBC-ENTMCNC: 33.8 GM/DL — SIGNIFICANT CHANGE UP (ref 32–36)
MCV RBC AUTO: 84.9 FL — SIGNIFICANT CHANGE UP (ref 80–100)
MCV RBC AUTO: 86.3 FL — SIGNIFICANT CHANGE UP (ref 80–100)
MCV RBC AUTO: 88.2 FL — SIGNIFICANT CHANGE UP (ref 80–100)
METAMYELOCYTES # FLD: 15.9 % — HIGH (ref 0–0)
METAMYELOCYTES # FLD: 7.6 % — HIGH (ref 0–0)
MONOCYTES # BLD AUTO: 0.31 K/UL — SIGNIFICANT CHANGE UP (ref 0–0.9)
MONOCYTES # BLD AUTO: 1.57 K/UL — HIGH (ref 0–0.9)
MONOCYTES NFR BLD AUTO: 5 % — SIGNIFICANT CHANGE UP (ref 2–14)
MONOCYTES NFR BLD AUTO: 6.2 % — SIGNIFICANT CHANGE UP (ref 2–14)
MYELOCYTES NFR BLD: 8 % — HIGH (ref 0–0)
NEUTROPHILS # BLD AUTO: 2.68 K/UL — SIGNIFICANT CHANGE UP (ref 1.8–7.4)
NEUTROPHILS # BLD AUTO: 24.23 K/UL — HIGH (ref 1.8–7.4)
NEUTROPHILS NFR BLD AUTO: 40.7 % — LOW (ref 43–77)
NEUTROPHILS NFR BLD AUTO: 59.7 % — SIGNIFICANT CHANGE UP (ref 43–77)
NEUTS BAND # BLD: 12.4 % — HIGH (ref 0–8)
NEUTS BAND # BLD: 17.6 % — HIGH (ref 0–8)
NITRITE UR-MCNC: NEGATIVE — SIGNIFICANT CHANGE UP
NRBC # BLD: 0 /100 WBCS — SIGNIFICANT CHANGE UP (ref 0–0)
NT-PROBNP SERPL-SCNC: HIGH PG/ML (ref 0–300)
OSMOLALITY UR: 318 MOS/KG — SIGNIFICANT CHANGE UP (ref 300–900)
OVALOCYTES BLD QL SMEAR: SLIGHT — SIGNIFICANT CHANGE UP
PCO2 BLDV: 31 MMHG — LOW (ref 39–42)
PCO2 BLDV: 36 MMHG — LOW (ref 39–42)
PCO2 BLDV: 44 MMHG — HIGH (ref 39–42)
PH BLDV: 7.09 — CRITICAL LOW (ref 7.32–7.43)
PH BLDV: 7.21 — LOW (ref 7.32–7.43)
PH BLDV: 7.22 — LOW (ref 7.32–7.43)
PH UR: 5.5 — SIGNIFICANT CHANGE UP (ref 5–8)
PHOSPHATE SERPL-MCNC: 2.6 MG/DL — SIGNIFICANT CHANGE UP (ref 2.5–4.5)
PLAT MORPH BLD: NORMAL — SIGNIFICANT CHANGE UP
PLAT MORPH BLD: NORMAL — SIGNIFICANT CHANGE UP
PLATELET # BLD AUTO: 60 K/UL — LOW (ref 150–400)
PLATELET # BLD AUTO: 77 K/UL — LOW (ref 150–400)
PLATELET # BLD AUTO: SIGNIFICANT CHANGE UP K/UL (ref 150–400)
PO2 BLDV: 24 MMHG — LOW (ref 25–45)
PO2 BLDV: 55 MMHG — HIGH (ref 25–45)
PO2 BLDV: 65 MMHG — HIGH (ref 25–45)
POIKILOCYTOSIS BLD QL AUTO: SIGNIFICANT CHANGE UP
POIKILOCYTOSIS BLD QL AUTO: SLIGHT — SIGNIFICANT CHANGE UP
POTASSIUM BLDV-SCNC: 3.9 MMOL/L — SIGNIFICANT CHANGE UP (ref 3.5–5.1)
POTASSIUM BLDV-SCNC: 3.9 MMOL/L — SIGNIFICANT CHANGE UP (ref 3.5–5.1)
POTASSIUM BLDV-SCNC: 4.6 MMOL/L — SIGNIFICANT CHANGE UP (ref 3.5–5.1)
POTASSIUM SERPL-MCNC: 3.6 MMOL/L — SIGNIFICANT CHANGE UP (ref 3.5–5.3)
POTASSIUM SERPL-MCNC: 3.7 MMOL/L — SIGNIFICANT CHANGE UP (ref 3.5–5.3)
POTASSIUM SERPL-MCNC: 4.2 MMOL/L — SIGNIFICANT CHANGE UP (ref 3.5–5.3)
POTASSIUM SERPL-MCNC: 4.9 MMOL/L — SIGNIFICANT CHANGE UP (ref 3.5–5.3)
POTASSIUM SERPL-SCNC: 3.6 MMOL/L — SIGNIFICANT CHANGE UP (ref 3.5–5.3)
POTASSIUM SERPL-SCNC: 3.7 MMOL/L — SIGNIFICANT CHANGE UP (ref 3.5–5.3)
POTASSIUM SERPL-SCNC: 4.2 MMOL/L — SIGNIFICANT CHANGE UP (ref 3.5–5.3)
POTASSIUM SERPL-SCNC: 4.9 MMOL/L — SIGNIFICANT CHANGE UP (ref 3.5–5.3)
POTASSIUM UR-SCNC: 46 MMOL/L — SIGNIFICANT CHANGE UP
PROCALCITONIN SERPL-MCNC: 45.41 NG/ML — HIGH (ref 0.02–0.1)
PROT ?TM UR-MCNC: 134 MG/DL — HIGH (ref 0–12)
PROT SERPL-MCNC: 4.6 G/DL — LOW (ref 6–8.3)
PROT SERPL-MCNC: 5.3 G/DL — LOW (ref 6–8.3)
PROT SERPL-MCNC: 5.7 G/DL — LOW (ref 6–8.3)
PROT SERPL-MCNC: 6 G/DL — SIGNIFICANT CHANGE UP (ref 6–8.3)
PROT UR-MCNC: 100 MG/DL
PROT/CREAT UR-RTO: 1.5 RATIO — HIGH (ref 0–0.2)
PROTHROM AB SERPL-ACNC: 16.6 SEC — HIGH (ref 9.5–13)
PROTHROM AB SERPL-ACNC: 19.9 SEC — HIGH (ref 9.5–13)
RAPIDTEG MAXIMUM AMPLITUDE: 56.2 MM — SIGNIFICANT CHANGE UP (ref 52–70)
RBC # BLD: 3.5 M/UL — LOW (ref 3.8–5.2)
RBC # BLD: 3.64 M/UL — LOW (ref 3.8–5.2)
RBC # BLD: 3.9 M/UL — SIGNIFICANT CHANGE UP (ref 3.8–5.2)
RBC # FLD: 12.9 % — SIGNIFICANT CHANGE UP (ref 10.3–14.5)
RBC # FLD: 12.9 % — SIGNIFICANT CHANGE UP (ref 10.3–14.5)
RBC # FLD: 13.1 % — SIGNIFICANT CHANGE UP (ref 10.3–14.5)
RBC BLD AUTO: ABNORMAL
RBC BLD AUTO: ABNORMAL
RBC CASTS # UR COMP ASSIST: 6 /HPF — HIGH (ref 0–4)
REVIEW: SIGNIFICANT CHANGE UP
RH IG SCN BLD-IMP: POSITIVE — SIGNIFICANT CHANGE UP
RSV RNA NPH QL NAA+NON-PROBE: SIGNIFICANT CHANGE UP
SAO2 % BLDV: 31.4 % — LOW (ref 67–88)
SAO2 % BLDV: 79.2 % — SIGNIFICANT CHANGE UP (ref 67–88)
SAO2 % BLDV: 95.1 % — HIGH (ref 67–88)
SARS-COV-2 RNA SPEC QL NAA+PROBE: SIGNIFICANT CHANGE UP
SODIUM SERPL-SCNC: 133 MMOL/L — LOW (ref 135–145)
SODIUM SERPL-SCNC: 133 MMOL/L — LOW (ref 135–145)
SODIUM SERPL-SCNC: 135 MMOL/L — SIGNIFICANT CHANGE UP (ref 135–145)
SODIUM SERPL-SCNC: 136 MMOL/L — SIGNIFICANT CHANGE UP (ref 135–145)
SODIUM UR-SCNC: 22 MMOL/L — SIGNIFICANT CHANGE UP
SP GR SPEC: 1.02 — SIGNIFICANT CHANGE UP (ref 1–1.03)
SQUAMOUS # UR AUTO: 26 /HPF — HIGH (ref 0–5)
TEG FUNCTIONAL FIBRINOGEN: 22.5 MM — SIGNIFICANT CHANGE UP (ref 15–32)
TEG LY30 (LYSIS): 0 % — SIGNIFICANT CHANGE UP (ref 0–2.6)
TEG REACTION TIME: 7.2 MIN — SIGNIFICANT CHANGE UP (ref 4.6–9.1)
TROPONIN T, HIGH SENSITIVITY RESULT: 68 NG/L — HIGH (ref 0–51)
TROPONIN T, HIGH SENSITIVITY RESULT: 71 NG/L — HIGH (ref 0–51)
TROPONIN T, HIGH SENSITIVITY RESULT: 85 NG/L — HIGH (ref 0–51)
UROBILINOGEN FLD QL: 1 MG/DL — SIGNIFICANT CHANGE UP (ref 0.2–1)
WBC # BLD: 31.34 K/UL — HIGH (ref 3.8–10.5)
WBC # BLD: 5.05 K/UL — SIGNIFICANT CHANGE UP (ref 3.8–10.5)
WBC # BLD: 50.44 K/UL — CRITICAL HIGH (ref 3.8–10.5)
WBC # FLD AUTO: 31.34 K/UL — HIGH (ref 3.8–10.5)
WBC # FLD AUTO: 5.05 K/UL — SIGNIFICANT CHANGE UP (ref 3.8–10.5)
WBC # FLD AUTO: 50.44 K/UL — CRITICAL HIGH (ref 3.8–10.5)
WBC UR QL: >998 /HPF — HIGH (ref 0–5)

## 2024-05-05 PROCEDURE — 99291 CRITICAL CARE FIRST HOUR: CPT | Mod: GC,25

## 2024-05-05 PROCEDURE — 74177 CT ABD & PELVIS W/CONTRAST: CPT | Mod: 26,MC

## 2024-05-05 PROCEDURE — 76377 3D RENDER W/INTRP POSTPROCES: CPT | Mod: 26

## 2024-05-05 PROCEDURE — 71045 X-RAY EXAM CHEST 1 VIEW: CPT | Mod: 26

## 2024-05-05 PROCEDURE — 70450 CT HEAD/BRAIN W/O DYE: CPT | Mod: 26,MC

## 2024-05-05 PROCEDURE — 45378 DIAGNOSTIC COLONOSCOPY: CPT | Mod: GC

## 2024-05-05 PROCEDURE — 99291 CRITICAL CARE FIRST HOUR: CPT | Mod: 25

## 2024-05-05 PROCEDURE — 93306 TTE W/DOPPLER COMPLETE: CPT | Mod: 26

## 2024-05-05 PROCEDURE — 93308 TTE F-UP OR LMTD: CPT | Mod: 26,GC

## 2024-05-05 PROCEDURE — 36556 INSERT NON-TUNNEL CV CATH: CPT | Mod: RT

## 2024-05-05 PROCEDURE — 76604 US EXAM CHEST: CPT | Mod: 26,GC

## 2024-05-05 PROCEDURE — 36620 INSERTION CATHETER ARTERY: CPT | Mod: GC

## 2024-05-05 PROCEDURE — 93356 MYOCRD STRAIN IMG SPCKL TRCK: CPT

## 2024-05-05 RX ORDER — NOREPINEPHRINE BITARTRATE/D5W 8 MG/250ML
0.05 PLASTIC BAG, INJECTION (ML) INTRAVENOUS
Qty: 8 | Refills: 0 | Status: DISCONTINUED | OUTPATIENT
Start: 2024-05-05 | End: 2024-05-05

## 2024-05-05 RX ORDER — CHLORHEXIDINE GLUCONATE 213 G/1000ML
1 SOLUTION TOPICAL
Refills: 0 | Status: DISCONTINUED | OUTPATIENT
Start: 2024-05-05 | End: 2024-05-06

## 2024-05-05 RX ORDER — INSULIN HUMAN 100 [IU]/ML
INJECTION, SOLUTION SUBCUTANEOUS EVERY 6 HOURS
Refills: 0 | Status: DISCONTINUED | OUTPATIENT
Start: 2024-05-05 | End: 2024-05-06

## 2024-05-05 RX ORDER — INSULIN GLARGINE 100 [IU]/ML
10 INJECTION, SOLUTION SUBCUTANEOUS AT BEDTIME
Refills: 0 | Status: DISCONTINUED | OUTPATIENT
Start: 2024-05-05 | End: 2024-05-05

## 2024-05-05 RX ORDER — INSULIN LISPRO 100/ML
VIAL (ML) SUBCUTANEOUS EVERY 4 HOURS
Refills: 0 | Status: DISCONTINUED | OUTPATIENT
Start: 2024-05-05 | End: 2024-05-05

## 2024-05-05 RX ORDER — SODIUM CHLORIDE 9 MG/ML
1000 INJECTION INTRAMUSCULAR; INTRAVENOUS; SUBCUTANEOUS ONCE
Refills: 0 | Status: COMPLETED | OUTPATIENT
Start: 2024-05-05 | End: 2024-05-05

## 2024-05-05 RX ORDER — BUDESONIDE AND FORMOTEROL FUMARATE DIHYDRATE 160; 4.5 UG/1; UG/1
2 AEROSOL RESPIRATORY (INHALATION)
Refills: 0 | Status: DISCONTINUED | OUTPATIENT
Start: 2024-05-05 | End: 2024-05-05

## 2024-05-05 RX ORDER — SODIUM CHLORIDE 9 MG/ML
1000 INJECTION, SOLUTION INTRAVENOUS ONCE
Refills: 0 | Status: COMPLETED | OUTPATIENT
Start: 2024-05-05 | End: 2024-05-05

## 2024-05-05 RX ORDER — VANCOMYCIN HCL 1 G
1000 VIAL (EA) INTRAVENOUS EVERY 24 HOURS
Refills: 0 | Status: DISCONTINUED | OUTPATIENT
Start: 2024-05-06 | End: 2024-05-06

## 2024-05-05 RX ORDER — PIPERACILLIN AND TAZOBACTAM 4; .5 G/20ML; G/20ML
3.38 INJECTION, POWDER, LYOPHILIZED, FOR SOLUTION INTRAVENOUS EVERY 12 HOURS
Refills: 0 | Status: DISCONTINUED | OUTPATIENT
Start: 2024-05-05 | End: 2024-05-05

## 2024-05-05 RX ORDER — SODIUM CHLORIDE 9 MG/ML
1000 INJECTION, SOLUTION INTRAVENOUS
Refills: 0 | Status: DISCONTINUED | OUTPATIENT
Start: 2024-05-05 | End: 2024-05-05

## 2024-05-05 RX ORDER — PIPERACILLIN AND TAZOBACTAM 4; .5 G/20ML; G/20ML
3.38 INJECTION, POWDER, LYOPHILIZED, FOR SOLUTION INTRAVENOUS EVERY 8 HOURS
Refills: 0 | Status: DISCONTINUED | OUTPATIENT
Start: 2024-05-05 | End: 2024-05-06

## 2024-05-05 RX ORDER — CALCIUM GLUCONATE 100 MG/ML
2 VIAL (ML) INTRAVENOUS ONCE
Refills: 0 | Status: COMPLETED | OUTPATIENT
Start: 2024-05-05 | End: 2024-05-06

## 2024-05-05 RX ORDER — METRONIDAZOLE 500 MG
TABLET ORAL
Refills: 0 | Status: DISCONTINUED | OUTPATIENT
Start: 2024-05-05 | End: 2024-05-06

## 2024-05-05 RX ORDER — HYDROCORTISONE 20 MG
50 TABLET ORAL EVERY 6 HOURS
Refills: 0 | Status: DISCONTINUED | OUTPATIENT
Start: 2024-05-05 | End: 2024-05-06

## 2024-05-05 RX ORDER — INSULIN LISPRO 100/ML
VIAL (ML) SUBCUTANEOUS
Refills: 0 | Status: DISCONTINUED | OUTPATIENT
Start: 2024-05-05 | End: 2024-05-05

## 2024-05-05 RX ORDER — PANTOPRAZOLE SODIUM 20 MG/1
80 TABLET, DELAYED RELEASE ORAL ONCE
Refills: 0 | Status: COMPLETED | OUTPATIENT
Start: 2024-05-05 | End: 2024-05-05

## 2024-05-05 RX ORDER — NOREPINEPHRINE BITARTRATE/D5W 8 MG/250ML
0.85 PLASTIC BAG, INJECTION (ML) INTRAVENOUS
Qty: 16 | Refills: 0 | Status: DISCONTINUED | OUTPATIENT
Start: 2024-05-05 | End: 2024-05-06

## 2024-05-05 RX ORDER — SODIUM BICARBONATE 1 MEQ/ML
50 SYRINGE (ML) INTRAVENOUS ONCE
Refills: 0 | Status: COMPLETED | OUTPATIENT
Start: 2024-05-05 | End: 2024-05-05

## 2024-05-05 RX ORDER — IPRATROPIUM/ALBUTEROL SULFATE 18-103MCG
3 AEROSOL WITH ADAPTER (GRAM) INHALATION EVERY 6 HOURS
Refills: 0 | Status: DISCONTINUED | OUTPATIENT
Start: 2024-05-05 | End: 2024-05-06

## 2024-05-05 RX ORDER — VANCOMYCIN HCL 1 G
500 VIAL (EA) INTRAVENOUS EVERY 6 HOURS
Refills: 0 | Status: DISCONTINUED | OUTPATIENT
Start: 2024-05-05 | End: 2024-05-06

## 2024-05-05 RX ORDER — BUDESONIDE, MICRONIZED 100 %
0.25 POWDER (GRAM) MISCELLANEOUS EVERY 12 HOURS
Refills: 0 | Status: DISCONTINUED | OUTPATIENT
Start: 2024-05-05 | End: 2024-05-06

## 2024-05-05 RX ORDER — KETAMINE HYDROCHLORIDE 100 MG/ML
70 INJECTION INTRAMUSCULAR; INTRAVENOUS ONCE
Refills: 0 | Status: DISCONTINUED | OUTPATIENT
Start: 2024-05-05 | End: 2024-05-05

## 2024-05-05 RX ORDER — VANCOMYCIN HCL 1 G
1000 VIAL (EA) INTRAVENOUS ONCE
Refills: 0 | Status: COMPLETED | OUTPATIENT
Start: 2024-05-05 | End: 2024-05-05

## 2024-05-05 RX ORDER — CHLORHEXIDINE GLUCONATE 213 G/1000ML
15 SOLUTION TOPICAL EVERY 12 HOURS
Refills: 0 | Status: DISCONTINUED | OUTPATIENT
Start: 2024-05-05 | End: 2024-05-06

## 2024-05-05 RX ORDER — SODIUM BICARBONATE 1 MEQ/ML
0.15 SYRINGE (ML) INTRAVENOUS
Qty: 150 | Refills: 0 | Status: DISCONTINUED | OUTPATIENT
Start: 2024-05-05 | End: 2024-05-05

## 2024-05-05 RX ORDER — HEPARIN SODIUM 5000 [USP'U]/ML
5000 INJECTION INTRAVENOUS; SUBCUTANEOUS EVERY 12 HOURS
Refills: 0 | Status: DISCONTINUED | OUTPATIENT
Start: 2024-05-05 | End: 2024-05-05

## 2024-05-05 RX ORDER — METRONIDAZOLE 500 MG
500 TABLET ORAL ONCE
Refills: 0 | Status: COMPLETED | OUTPATIENT
Start: 2024-05-05 | End: 2024-05-05

## 2024-05-05 RX ORDER — HEPARIN SODIUM 5000 [USP'U]/ML
5000 INJECTION INTRAVENOUS; SUBCUTANEOUS EVERY 8 HOURS
Refills: 0 | Status: DISCONTINUED | OUTPATIENT
Start: 2024-05-05 | End: 2024-05-06

## 2024-05-05 RX ORDER — CALCIUM GLUCONATE 100 MG/ML
1 VIAL (ML) INTRAVENOUS ONCE
Refills: 0 | Status: COMPLETED | OUTPATIENT
Start: 2024-05-05 | End: 2024-05-05

## 2024-05-05 RX ORDER — PROPOFOL 10 MG/ML
15 INJECTION, EMULSION INTRAVENOUS
Qty: 1000 | Refills: 0 | Status: DISCONTINUED | OUTPATIENT
Start: 2024-05-05 | End: 2024-05-06

## 2024-05-05 RX ORDER — MAGNESIUM SULFATE 500 MG/ML
2 VIAL (ML) INJECTION ONCE
Refills: 0 | Status: COMPLETED | OUTPATIENT
Start: 2024-05-05 | End: 2024-05-05

## 2024-05-05 RX ORDER — HUMAN INSULIN 100 [IU]/ML
3 INJECTION, SUSPENSION SUBCUTANEOUS EVERY 6 HOURS
Refills: 0 | Status: DISCONTINUED | OUTPATIENT
Start: 2024-05-05 | End: 2024-05-06

## 2024-05-05 RX ORDER — METRONIDAZOLE 500 MG
500 TABLET ORAL EVERY 8 HOURS
Refills: 0 | Status: DISCONTINUED | OUTPATIENT
Start: 2024-05-05 | End: 2024-05-06

## 2024-05-05 RX ORDER — VASOPRESSIN 20 [USP'U]/ML
0.04 INJECTION INTRAVENOUS
Qty: 40 | Refills: 0 | Status: DISCONTINUED | OUTPATIENT
Start: 2024-05-05 | End: 2024-05-06

## 2024-05-05 RX ORDER — MIDAZOLAM HYDROCHLORIDE 1 MG/ML
4 INJECTION, SOLUTION INTRAMUSCULAR; INTRAVENOUS ONCE
Refills: 0 | Status: DISCONTINUED | OUTPATIENT
Start: 2024-05-05 | End: 2024-05-05

## 2024-05-05 RX ORDER — SODIUM CHLORIDE 9 MG/ML
10 INJECTION INTRAMUSCULAR; INTRAVENOUS; SUBCUTANEOUS
Refills: 0 | Status: DISCONTINUED | OUTPATIENT
Start: 2024-05-05 | End: 2024-05-06

## 2024-05-05 RX ORDER — PIPERACILLIN AND TAZOBACTAM 4; .5 G/20ML; G/20ML
3.38 INJECTION, POWDER, LYOPHILIZED, FOR SOLUTION INTRAVENOUS ONCE
Refills: 0 | Status: COMPLETED | OUTPATIENT
Start: 2024-05-05 | End: 2024-05-05

## 2024-05-05 RX ORDER — VANCOMYCIN HCL 1 G
125 VIAL (EA) INTRAVENOUS EVERY 6 HOURS
Refills: 0 | Status: DISCONTINUED | OUTPATIENT
Start: 2024-05-05 | End: 2024-05-05

## 2024-05-05 RX ORDER — SODIUM BICARBONATE 1 MEQ/ML
0.2 SYRINGE (ML) INTRAVENOUS
Qty: 150 | Refills: 0 | Status: DISCONTINUED | OUTPATIENT
Start: 2024-05-05 | End: 2024-05-06

## 2024-05-05 RX ADMIN — Medication 2: at 18:03

## 2024-05-05 RX ADMIN — VASOPRESSIN 6 UNIT(S)/MIN: 20 INJECTION INTRAVENOUS at 14:22

## 2024-05-05 RX ADMIN — Medication 100 MILLIGRAM(S): at 15:20

## 2024-05-05 RX ADMIN — SODIUM CHLORIDE 2000 MILLILITER(S): 9 INJECTION, SOLUTION INTRAVENOUS at 15:20

## 2024-05-05 RX ADMIN — Medication 3: at 21:50

## 2024-05-05 RX ADMIN — Medication 100 GRAM(S): at 14:13

## 2024-05-05 RX ADMIN — Medication 25 GRAM(S): at 11:52

## 2024-05-05 RX ADMIN — Medication 50 MILLIEQUIVALENT(S): at 15:21

## 2024-05-05 RX ADMIN — Medication 500 MILLIGRAM(S): at 17:52

## 2024-05-05 RX ADMIN — Medication 100 MEQ/KG/HR: at 21:50

## 2024-05-05 RX ADMIN — PANTOPRAZOLE SODIUM 80 MILLIGRAM(S): 20 TABLET, DELAYED RELEASE ORAL at 12:03

## 2024-05-05 RX ADMIN — Medication 50 MILLIGRAM(S): at 17:53

## 2024-05-05 RX ADMIN — VASOPRESSIN 6 UNIT(S)/MIN: 20 INJECTION INTRAVENOUS at 21:50

## 2024-05-05 RX ADMIN — BUDESONIDE AND FORMOTEROL FUMARATE DIHYDRATE 2 PUFF(S): 160; 4.5 AEROSOL RESPIRATORY (INHALATION) at 18:03

## 2024-05-05 RX ADMIN — Medication 7.03 MICROGRAM(S)/KG/MIN: at 14:14

## 2024-05-05 RX ADMIN — Medication 7.03 MICROGRAM(S)/KG/MIN: at 09:30

## 2024-05-05 RX ADMIN — PIPERACILLIN AND TAZOBACTAM 200 GRAM(S): 4; .5 INJECTION, POWDER, LYOPHILIZED, FOR SOLUTION INTRAVENOUS at 09:30

## 2024-05-05 RX ADMIN — Medication 100 MILLIGRAM(S): at 21:50

## 2024-05-05 RX ADMIN — HEPARIN SODIUM 5000 UNIT(S): 5000 INJECTION INTRAVENOUS; SUBCUTANEOUS at 17:53

## 2024-05-05 RX ADMIN — KETAMINE HYDROCHLORIDE 70 MILLIGRAM(S): 100 INJECTION INTRAMUSCULAR; INTRAVENOUS at 21:00

## 2024-05-05 RX ADMIN — SODIUM CHLORIDE 1000 MILLILITER(S): 9 INJECTION INTRAMUSCULAR; INTRAVENOUS; SUBCUTANEOUS at 10:03

## 2024-05-05 RX ADMIN — SODIUM CHLORIDE 1000 MILLILITER(S): 9 INJECTION INTRAMUSCULAR; INTRAVENOUS; SUBCUTANEOUS at 10:20

## 2024-05-05 RX ADMIN — Medication 7.03 MICROGRAM(S)/KG/MIN: at 21:51

## 2024-05-05 RX ADMIN — PIPERACILLIN AND TAZOBACTAM 25 GRAM(S): 4; .5 INJECTION, POWDER, LYOPHILIZED, FOR SOLUTION INTRAVENOUS at 21:50

## 2024-05-05 RX ADMIN — Medication 0.5 MILLIGRAM(S): at 15:20

## 2024-05-05 RX ADMIN — PIPERACILLIN AND TAZOBACTAM 25 GRAM(S): 4; .5 INJECTION, POWDER, LYOPHILIZED, FOR SOLUTION INTRAVENOUS at 17:52

## 2024-05-05 RX ADMIN — Medication 250 MILLIGRAM(S): at 11:52

## 2024-05-05 RX ADMIN — MIDAZOLAM HYDROCHLORIDE 4 MILLIGRAM(S): 1 INJECTION, SOLUTION INTRAMUSCULAR; INTRAVENOUS at 21:00

## 2024-05-05 RX ADMIN — Medication 75 MEQ/KG/HR: at 15:20

## 2024-05-05 NOTE — H&P ADULT - HISTORY OF PRESENT ILLNESS
74yo female, pmh HTN, T2DM on ozempic/insulin, CVA history w residual L-sided weakness, orthostatic hypotension, asthma presenting for fevers, chills, rigor, abdominal pain, nausea, and vomiting x 1 day duration. Patient had one episode of non-bloody/non-bilious emesis, was brought in by EMS when patient was found on floor by her health aide. Patient also reports recurrent falls recently, had a fall yesterday during the day, EMS was called, patient was evaluated, HD stable, patient had another fall again during the day, reported feeling dizzy prior to the fall. In ED, patient vitals were notable for hypotension 60/40, was given 2L IVF and subsequently started on levophed. Labs notable for WBC of 31, pH 7.22 with lactate 7.7. CT showing diffuse colitis, admitted to MICU for septic shock secondary to colitis vs UTI requiring pressors.  74yo female, pmh HTN, T2DM on ozempic/insulin, CVA history w residual L-sided weakness, orthostatic hypotension, asthma presenting for fevers, chills, rigor, abdominal pain, nausea, and vomiting x 1 day duration. Patient had one episode of non-bloody/non-bilious emesis, was brought in by EMS when patient was found on floor by her health aide. Patient also reports recurrent falls recently, had a fall yesterday during the day, EMS was called, patient was evaluated, HD stable, patient had another fall again during the day, reported feeling dizzy prior to the fall. In ED, patient vitals were notable for hypotension 60/40, was given 2L IVF and subsequently started on levophed. Labs notable for WBC of 31, pH 7.22 with lactate 7.7, UA positive for bacteria. CT showing diffuse colitis, admitted to MICU for septic shock secondary to colitis vs UTI requiring pressors.

## 2024-05-05 NOTE — CONSULT NOTE ADULT - SUBJECTIVE AND OBJECTIVE BOX
[facility] DIVISION OF KIDNEY DISEASES AND HYPERTENSION -- [phone]  -- INITIAL CONSULT NOTE  --------------------------------------------------------------------------------  HPI: 73F with PMH of HTN, HLD, DM2, asthma, and CVA who presented to the ED due to ~1 week of fevers/chills and n/v/d. Nephrology consulted for severe metabolic acidosis, GERALDINE, and anuria.     On review of labs on [place], last Scr was WNL at 0.76 on 7/12/2019. Scr on admission (5/5) was elevated at 2.97 and remains elevated/stable at 2.57 on repeat. SCO2 was 16 and has decreased to <10.     Pt. was seen and examined at the bedside in the MICU with family present. Of note, pt. has been on Ozempic since December 2023 and has lost ~25 lbs. After her dose of Ozempic, she does experience n/v/d which resolves. Her last dose was ~10 days ago. Pt. reports fevers and nausea/vomiting/diarrhea approximately for the past week. She has also had a decreased appetite as a result of being on Ozempic.     PAST HISTORY  --------------------------------------------------------------------------------  PAST MEDICAL & SURGICAL HISTORY:  Diabetes  HTN (hypertension)  HLD (hyperlipidemia)  Asthma  CVA (cerebral vascular accident)  Orthostatic hypotension    FAMILY HISTORY:  No family history of kidney disease/dialysis     PAST SOCIAL HISTORY:  Lives at home by herself. Has health aide at home. Uses walker.   No substance use history (05 May 2024 13:10)    ALLERGIES & MEDICATIONS  --------------------------------------------------------------------------------  Allergies  No Known Allergies    Intolerances    Standing Inpatient Medicationsbudesonide  80 MICROgram(s)/formoterol 4.5 MICROgram(s) Inhaler 2 Puff(s) Inhalation two times a day  dextrose 5% 1000 milliLiter(s) IV Continuous <Continuous>  heparin   Injectable 5000 Unit(s) SubCutaneous every 12 hours  hydrocortisone sodium succinate Injectable 50 milliGRAM(s) IV Push every 6 hours  insulin glargine Injectable (LANTUS) 10 Unit(s) SubCutaneous at bedtime  insulin lispro (ADMELOG) corrective regimen sliding scale   SubCutaneous every 4 hours  metroNIDAZOLE  IVPB      metroNIDAZOLE  IVPB 500 milliGRAM(s) IV Intermittent every 8 hours  norepinephrine Infusion 0.05 MICROgram(s)/kG/Min IV Continuous <Continuous>  piperacillin/tazobactam IVPB.. 3.375 Gram(s) IV Intermittent every 12 hours  sodium bicarbonate  Infusion 0.15 mEq/kG/Hr IV Continuous <Continuous>  vancomycin    Solution 500 milliGRAM(s) Oral every 6 hours  vasopressin Infusion 0.04 Unit(s)/Min IV Continuous <Continuous>    PRN Inpatient Medications    REVIEW OF SYSTEMS  --------------------------------------------------------------------------------  All other systems were reviewed and are negative, except as noted.    VITALS/PHYSICAL EXAM  --------------------------------------------------------------------------------  T(C): 36 (05-05-24 @ 16:00), Max: 36.6 (05-05-24 @ 09:59)  HR: 110 (05-05-24 @ 16:30) (100 - 118)  BP: 133/67 (05-05-24 @ 14:45) (61/42 - 164/91)  RR: 18 (05-05-24 @ 16:30) (16 - 21)  SpO2: 93% (05-05-24 @ 16:30) (89% - 99%)  Wt(kg): --    Weight (kg): 75 (05-05-24 @ 09:28)    05-05-24 @ 07:01  -  05-05-24 @ 16:40  --------------------------------------------------------  IN: 1541.3 mL / OUT: 10 mL / NET: 1531.3 mL    Physical Exam:  Gen: NAD, mildly ill-appearing  HEENT: dry MM  Pulm: CTA B/L  CV: S1S2  Abd: Soft, +BS   Ext: No B/L LE edema  Neuro: Awake  Skin: Warm and dry  Vascular access: none for HD    LABS/STUDIES  --------------------------------------------------------------------------------              9.9    x     >-----------<  x        [05-05-24 @ 16:11]              30.2     133  |  97  |  36  ----------------------------<  280      [05-05-24 @ 13:54]  3.7   |  <10  |  2.84        Ca     7.8     [05-05-24 @ 13:54]      Mg     1.0     [05-05-24 @ 10:00]      Phos  3.3     [05-05-24 @ 11:52]    TPro  5.3  /  Alb  2.8  /  TBili  3.9  /  DBili  x   /  AST  54  /  ALT  34  /  AlkPhos  328  [05-05-24 @ 13:54]    PT/INR: PT 16.6 , INR 1.53       [05-05-24 @ 10:00]  PTT: 33.4       [05-05-24 @ 10:00]    Uric acid 8.1      [05-05-24 @ 11:52]        [05-05-24 @ 15:52]        [05-05-24 @ 11:52]    Creatinine Trend:  SCr 2.84 [05-05 @ 13:54]  SCr 2.73 [05-05 @ 11:52]  SCr 2.97 [05-05 @ 10:00]    Urine Sodium 22      [05-05-24 @ 15:53]  Urine Potassium 46      [05-05-24 @ 15:53]  Urine Osmolality 318      [05-05-24 @ 15:54]

## 2024-05-05 NOTE — CONSULT NOTE ADULT - PROBLEM SELECTOR RECOMMENDATION 9
Pt. with metabolic acidosis in the setting of elevated lactate, GERALDINE, and diarrhea. SCO2 on admission (5/5) was 16 and has decreased to <10 on repeat. pH is low at 7.13, pCO2 31. Lactate is elevated at 13. Pt. currently on bicarb infusion and received IVP of 1 amp. Plan to initiate on CRRT once access is obtained. Monitor SCO2 levels.

## 2024-05-05 NOTE — H&P ADULT - ATTENDING COMMENTS
73 year old female with a history of hypertension, DM (ozempic, insulin), CVA, asthma presenting with septic shock c/b acute renal failure, severe acidosis    N: CVA history  Mentating at her baseline  - Delirium precautions  - Continue to monitor clinically, neuro checks  CV: Septic shock   HTN  - On escalating doses of levophed, will add vaso, stress dose steroids  - Formal TTE pending, but bedside POCUS with VTI 19, no wall motion abnormalities, IVC small and collapsing  - Will provide additional IVF as guided by her exam  - Holding home antihypertensives, diuretics  P: AHRF  Asthma  - On dulera at home, will adjust to Symbicort while admitted  - On room air saturating between 90-92%, will provide supplemental oxygen as needed  GI: Diffuse colitis: No diarrhea, noted on imaging and with signfiicant abdominal pain  Multiple hepatic lesions  - Stool infectious panel, C.Diff  - Will start Vanc and Flagyl for empiric C.Diff treatment pending data and clinical course  - Continue to monitor abdominal exam closely   - Surgery consulted in the ED, no acute abdomen or intervention; appreciate their input inthis case  - Broad coverage for possible intra-abdominal source  - Will need follow up for these liver lesions once more clinically stable  R: Anuric renal failure, likely ATN iso UTI, septic shock  Lactic acidosis  Severe mixed metabolic acidosis  Urinary tract infection  - Will start bicarbonate gtt, immediate push now  - Continue to trend lactate  - Blanco in place, strict I/O   - Trend uop, creatinine, lytes  - Will consult nephrology for likely dialysis in the setting of severe acidosis, acute renal failure,septic shock  ID: Septic shock, source not completely clear: UTI verses Colitis  - Full infectious work up sent and pending: UA, UCx, BCx, GI PCR, C.diff  - On Vanc and Zosyn, as well as oral vanc, flagyl  - If escalating pressors, continues to worsen can double cover PSA with Aminoglycoside  Endo: DM  - A1C   - Insulin as per resident note, avoid hypoglycemia, BG <210  Heme: Thrombocytopenia: Suspect in the setting of sepsis  - Will send DIC panel, Coags  - Trend CBC  - Continue to monitor clinically    Full Code  HCP and Decision maker: Daughter 73 year old female with a history of hypertension, DM (ozempic, insulin), CVA, asthma presenting with septic shock c/b acute renal failure, severe acidosis  Lines  A Line 5/5  [facility] line 5/5     N: CVA history  Mentating at her baseline  - Delirium precautions  - Continue to monitor clinically, neuro checks  CV: Septic shock   HTN  - On escalating doses of levophed, will add vaso, stress dose steroids  - Formal TTE pending, but bedside POCUS with VTI 19, no wall motion abnormalities, IVC small and collapsing  - Will provide additional IVF as guided by her exam  - Holding home antihypertensives, diuretics  P: AHRF  Asthma  - On dulera at home, will adjust to Symbicort while admitted  - On room air saturating between 90-92%, will provide supplemental oxygen as needed  GI: Diffuse colitis: No diarrhea, noted on imaging and with signfiicant abdominal pain  Multiple hepatic lesions  - Stool infectious panel, C.Diff  - Will start Vanc and Flagyl for empiric C.Diff treatment pending data and clinical course  - Continue to monitor abdominal exam closely   - Surgery consulted in the ED, no acute abdomen or intervention; appreciate their input inthis case  - Broad coverage for possible intra-abdominal source  - Will need follow up for these liver lesions once more clinically stable  R: Anuric renal failure, likely ATN iso UTI, septic shock  Lactic acidosis  Severe mixed metabolic acidosis  Urinary tract infection  - Will start bicarbonate gtt, immediate push now  - Continue to trend lactate  - Blanco in place, strict I/O   - Trend uop, creatinine, lytes  - Will consult nephrology for likely dialysis in the setting of severe acidosis, acute renal failure,septic shock  ID: Septic shock, source not completely clear: UTI verses Colitis  - Full infectious work up sent and pending: UA, UCx, BCx, GI PCR, C.diff  - On Vanc and Zosyn, as well as oral vanc, flagyl  - If escalating pressors, continues to worsen can double cover PSA with Aminoglycoside  Endo: DM  - A1C   - Insulin as per resident note, avoid hypoglycemia, BG <210  Heme: Thrombocytopenia: Suspect in the setting of sepsis  - Will send DIC panel, Coags  - Trend CBC  - Continue to monitor clinically    Full Code  HCP and Decision maker: Daughter

## 2024-05-05 NOTE — H&P ADULT - NSHPREVIEWOFSYSTEMS_GEN_ALL_CORE
REVIEW OF SYSTEMS:    CONSTITUTIONAL: + Fevers, chills, rigors. No weakness.   EYES/ENT: No visual changes;  No vertigo or throat pain   NECK: No pain or stiffness  RESPIRATORY: No cough, wheezing, hemoptysis; No shortness of breath  CARDIOVASCULAR: No chest pain or palpitations  GASTROINTESTINAL: + Abdominal pain, nausea, and vomiting. No diarrhea or constipation. No melena or hematochezia.  GENITOURINARY: No dysuria, frequency or hematuria  NEUROLOGICAL: No numbness or weakness  SKIN: No itching, rashes

## 2024-05-05 NOTE — PROCEDURE NOTE - NSSITEPREP_SKIN_A_CORE
chlorhexidine
chlorhexidine
20 yo P1 patient from [facility], [facility] c/b GDMA2, who presented to the hospital for decreased fetal movement and was admitted for induction of labor for a BPP of 6/8. Patient had an uncomplicated VAVD with , Hct as below. On postpartum day 0, patient experienced a syncopal episode + hypotension (systolic BP in 80s) for which an RRT was called. EKG was done and STAT labs sent. EKG showed incomplete RBBB. H/H stable, cardiac troponins negative x2. FAST scan also negative. Pt received LR bolus x2 with stabilization of vital signs.     On postpartum day 2, patient was discharged home in stable condition, voiding spontaneously and with normal vital signs.
22 yo P1 patient from [facility], [facility] c/b GDMA2, who presented to the hospital for decreased fetal movement and was admitted for induction of labor for a BPP of 6/8. Patient had an uncomplicated VAVD with , Hct as below. On postpartum day 0, patient experienced a syncopal episode + hypotension (systolic BP in 80s) for which an RRT was called. EKG was done and STAT labs sent. EKG showed incomplete RBBB. H/H stable, cardiac troponins negative x2. FAST scan also negative. Pt received LR bolus x2 with stabilization of vital signs.     Pt's EKG was repeated on postpartum day 1 which showed NSR without evidence of RBBB. Outpatient cardiology appointment was scheduled for patient.   Wed, Mar 30 @1:30pm   Dr. Sofia Chang  [address] [facility], Entrance 3    On postpartum day 3, patient was discharged home in stable condition, voiding spontaneously and with normal vital signs.    Contraception options were discussed with patient but she declined. Circumcision for male infant was completed on postpartum day 1, no complications.

## 2024-05-05 NOTE — ED ADULT NURSE REASSESSMENT NOTE - NS ED NURSE REASSESS COMMENT FT1
Levophed remains running at 0.5 mcg/kg/min. MICU at bedside. Safety and comfort measures maintained.

## 2024-05-05 NOTE — ED ADULT NURSE REASSESSMENT NOTE - NS ED NURSE REASSESS COMMENT FT1
Patient's IV in LAC infiltrated with levophed. IV site was checked after coming back from CT and swelling to arm was noticed a few minutes after that. MD aware. Cold packs placed on site. MICU aware and states they will reverse it when she gets up to MICU. Patient denies pain at site or any other new complaints. Plan of care in progress. Patient's IV in LAC infiltrated with levophed. IV site was checked after coming back from CT and swelling to arm was noticed a few minutes after that. MD aware. Cold packs placed on site and affected arm elevated. MICU aware and states they will reverse it when she gets up to MICU. Patient denies pain at site or any other new complaints. Plan of care in progress.

## 2024-05-05 NOTE — CONSULT NOTE ADULT - SUBJECTIVE AND OBJECTIVE BOX
Initial GI Consult    Patient is a 73y old  Female who presents with a chief complaint of Sepsis (05 May 2024 16:39)    HPI:  Per chart review: 72yo female, pmh HTN, T2DM on ozempic/insulin, CVA history w residual L-sided weakness, orthostatic hypotension, asthma presenting for fevers, chills, rigor, abdominal pain, nausea, and vomiting x 1 day duration. Patient had one episode of non-bloody/non-bilious emesis, was brought in by EMS when patient was found on floor by her health aide. Patient also reports recurrent falls recently, had a fall yesterday during the day, EMS was called, patient was evaluated, HD stable, patient had another fall again during the day, reported feeling dizzy prior to the fall.    PAST MEDICAL & SURGICAL HISTORY:  Diabetes      HTN (hypertension)      HLD (hyperlipidemia)      Asthma      CVA (cerebral vascular accident)      Orthostatic hypotension        FAMILY HISTORY:      MEDS:  MEDICATIONS  (STANDING):  budesonide  80 MICROgram(s)/formoterol 4.5 MICROgram(s) Inhaler 2 Puff(s) Inhalation two times a day  chlorhexidine 0.12% Liquid 15 milliLiter(s) Oral Mucosa every 12 hours  chlorhexidine 4% Liquid 1 Application(s) Topical <User Schedule>  CRRT Treatment    <Continuous>  heparin   Injectable 5000 Unit(s) SubCutaneous every 12 hours  hydrocortisone sodium succinate Injectable 50 milliGRAM(s) IV Push every 6 hours  insulin lispro (ADMELOG) corrective regimen sliding scale   SubCutaneous every 4 hours  ketamine Injectable 70 milliGRAM(s) IV Push once  metroNIDAZOLE  IVPB      metroNIDAZOLE  IVPB 500 milliGRAM(s) IV Intermittent every 8 hours  midazolam Injectable 4 milliGRAM(s) IV Push once  norepinephrine Infusion 0.05 MICROgram(s)/kG/Min (7.03 mL/Hr) IV Continuous <Continuous>  Phoxillum Filtration BK 4 / 2.5 5000 milliLiter(s) (1300 mL/Hr) CRRT <Continuous>  Phoxillum Filtration BK 4 / 2.5 5000 milliLiter(s) (200 mL/Hr) CRRT <Continuous>  piperacillin/tazobactam IVPB.. 3.375 Gram(s) IV Intermittent every 8 hours  PrismaSATE Dialysate BGK 4 / 2.5 5000 milliLiter(s) (1500 mL/Hr) CRRT <Continuous>  propofol Infusion 15 MICROgram(s)/kG/Min (6.05 mL/Hr) IV Continuous <Continuous>  sodium bicarbonate  Infusion 0.2 mEq/kG/Hr (100 mL/Hr) IV Continuous <Continuous>  vancomycin    Solution 500 milliGRAM(s) Oral every 6 hours  vasopressin Infusion 0.04 Unit(s)/Min (6 mL/Hr) IV Continuous <Continuous>    MEDICATIONS  (PRN):  sodium chloride 0.9% lock flush 10 milliLiter(s) IV Push every 1 hour PRN Pre/post blood products, medications, blood draw, and to maintain line patency    Allergies    No Known Allergies    Intolerances          CONSTITUTIONAL:  No weight loss, fever, chills, weakness or fatigue.  HEENT:  Eyes:  No visual loss, blurred vision, double vision or yellow sclerae.  SKIN:  No rash or itching.  CARDIOVASCULAR:  No chest pain, chest pressure or chest discomfort.  RESPIRATORY:  No shortness of breath, cough or sputum.  GASTROINTESTINAL:  SEE HPI  GENITOURINARY:  No dysuria, hematuria, urinary frequency  NEUROLOGICAL:  No headache, dizziness, syncope, paralysis, ataxia, numbness or tingling in the extremities.  MUSCULOSKELETAL:  No muscle, back pain, joint pain or stiffness.  ENDOCRINOLOGIC:  No reports of sweating, cold or heat intolerance.     ______________________________________________________________________  PHYSICAL EXAM:  T(C): 36 (05-05-24 @ 16:00), Max: 36.6 (05-05-24 @ 09:59)  HR: 107 (05-05-24 @ 18:45)  BP: 133/67 (05-05-24 @ 14:45)  RR: 23 (05-05-24 @ 18:45)  SpO2: 92% (05-05-24 @ 18:45)  Wt(kg): --    05-05 - 05-05  --------------------------------------------------------  IN:    IV PiggyBack: 200 mL    Lactated Ringers Bolus: 1000 mL    Norepinephrine: 426.2 mL    Sodium Bicarbonate: 300 mL    Sodium Bicarbonate: 100 mL    Vasopressin: 30 mL  Total IN: 2056.2 mL    OUT:    Indwelling Catheter - Urethral (mL): 35 mL  Total OUT: 35 mL    Total NET: 2021.2 mL          GEN: NAD, normocephalic  CVS: S1S2+  CHEST: clear to auscultation  ABD: soft , nontender, nondistended, bowel sounds present  EXTR: no cyanosis, no clubbing, no edema  NEURO: A&OX  SKIN:  warm;  non icteric    ______________________________________________________________________  LABS:                        9.9    5.05  )-----------( Clumped    ( 05 May 2024 16:11 )             30.2     05-05    136  |  99  |  33<H>  ----------------------------<  254<H>  3.6   |  <10<LL>  |  2.57<H>    Ca    7.6<L>      05 May 2024 16:08  Phos  3.3     05-05  Mg     1.0     05-05    TPro  4.6<L>  /  Alb  2.2<L>  /  TBili  3.7<H>  /  DBili  x   /  AST  54<H>  /  ALT  32  /  AlkPhos  583<H>  05-05    LIVER FUNCTIONS - ( 05 May 2024 16:08 )  Alb: 2.2 g/dL / Pro: 4.6 g/dL / ALK PHOS: 583 U/L / ALT: 32 U/L / AST: 54 U/L / GGT: x           PT/INR - ( 05 May 2024 15:52 )   PT: 19.9 sec;   INR: 1.84 ratio         PTT - ( 05 May 2024 15:52 )  PTT:34.8 sec  ____________________________________________         Initial GI Consult    thePatient is a 73y old  Female who presents with a chief complaint of Sepsis (05 May 2024 16:39)    HPI:  Per chart review: 72yo female, pmh HTN, T2DM on ozempic/insulin, CVA history w residual L-sided weakness, orthostatic hypotension, asthma presenting for fevers, chills, rigor, abdominal pain, nausea, and vomiting x 1 day duration. Patient had one episode of non-bloody/non-bilious emesis, was brought in by EMS when patient was found on floor by her health aide. Patient also reports recurrent falls recently, had a fall yesterday during the day, EMS was called, patient was evaluated, HD stable, patient had another fall again during the day, reported feeling dizzy prior to the fall.    PAST MEDICAL & SURGICAL HISTORY:  Diabetes      HTN (hypertension)      HLD (hyperlipidemia)      Asthma      CVA (cerebral vascular accident)      Orthostatic hypotension        FAMILY HISTORY:      MEDS:  MEDICATIONS  (STANDING):  budesonide  80 MICROgram(s)/formoterol 4.5 MICROgram(s) Inhaler 2 Puff(s) Inhalation two times a day  chlorhexidine 0.12% Liquid 15 milliLiter(s) Oral Mucosa every 12 hours  chlorhexidine 4% Liquid 1 Application(s) Topical <User Schedule>  CRRT Treatment    <Continuous>  heparin   Injectable 5000 Unit(s) SubCutaneous every 12 hours  hydrocortisone sodium succinate Injectable 50 milliGRAM(s) IV Push every 6 hours  insulin lispro (ADMELOG) corrective regimen sliding scale   SubCutaneous every 4 hours  ketamine Injectable 70 milliGRAM(s) IV Push once  metroNIDAZOLE  IVPB      metroNIDAZOLE  IVPB 500 milliGRAM(s) IV Intermittent every 8 hours  midazolam Injectable 4 milliGRAM(s) IV Push once  norepinephrine Infusion 0.05 MICROgram(s)/kG/Min (7.03 mL/Hr) IV Continuous <Continuous>  Phoxillum Filtration BK 4 / 2.5 5000 milliLiter(s) (1300 mL/Hr) CRRT <Continuous>  Phoxillum Filtration BK 4 / 2.5 5000 milliLiter(s) (200 mL/Hr) CRRT <Continuous>  piperacillin/tazobactam IVPB.. 3.375 Gram(s) IV Intermittent every 8 hours  PrismaSATE Dialysate BGK 4 / 2.5 5000 milliLiter(s) (1500 mL/Hr) CRRT <Continuous>  propofol Infusion 15 MICROgram(s)/kG/Min (6.05 mL/Hr) IV Continuous <Continuous>  sodium bicarbonate  Infusion 0.2 mEq/kG/Hr (100 mL/Hr) IV Continuous <Continuous>  vancomycin    Solution 500 milliGRAM(s) Oral every 6 hours  vasopressin Infusion 0.04 Unit(s)/Min (6 mL/Hr) IV Continuous <Continuous>    MEDICATIONS  (PRN):  sodium chloride 0.9% lock flush 10 milliLiter(s) IV Push every 1 hour PRN Pre/post blood products, medications, blood draw, and to maintain line patency    Allergies    No Known Allergies    Intolerances          CONSTITUTIONAL:  No weight loss, fever, chills, weakness or fatigue.  HEENT:  Eyes:  No visual loss, blurred vision, double vision or yellow sclerae.  SKIN:  No rash or itching.  CARDIOVASCULAR:  No chest pain, chest pressure or chest discomfort.  RESPIRATORY:  No shortness of breath, cough or sputum.  GASTROINTESTINAL:  SEE HPI  GENITOURINARY:  No dysuria, hematuria, urinary frequency  NEUROLOGICAL:  No headache, dizziness, syncope, paralysis, ataxia, numbness or tingling in the extremities.  MUSCULOSKELETAL:  No muscle, back pain, joint pain or stiffness.  ENDOCRINOLOGIC:  No reports of sweating, cold or heat intolerance.     ______________________________________________________________________  PHYSICAL EXAM:  T(C): 36 (05-05-24 @ 16:00), Max: 36.6 (05-05-24 @ 09:59)  HR: 107 (05-05-24 @ 18:45)  BP: 133/67 (05-05-24 @ 14:45)  RR: 23 (05-05-24 @ 18:45)  SpO2: 92% (05-05-24 @ 18:45)  Wt(kg): --    05-05 - 05-05  --------------------------------------------------------  IN:    IV PiggyBack: 200 mL    Lactated Ringers Bolus: 1000 mL    Norepinephrine: 426.2 mL    Sodium Bicarbonate: 300 mL    Sodium Bicarbonate: 100 mL    Vasopressin: 30 mL  Total IN: 2056.2 mL    OUT:    Indwelling Catheter - Urethral (mL): 35 mL  Total OUT: 35 mL    Total NET: 2021.2 mL          GEN: NAD, normocephalic  CVS: S1S2+  CHEST: clear to auscultation  ABD: soft , nontender, nondistended, bowel sounds present  EXTR: no cyanosis, no clubbing, no edema  NEURO: A&OX  SKIN:  warm;  non icteric    ______________________________________________________________________  LABS:                        9.9    5.05  )-----------( Clumped    ( 05 May 2024 16:11 )             30.2     05-05    136  |  99  |  33<H>  ----------------------------<  254<H>  3.6   |  <10<LL>  |  2.57<H>    Ca    7.6<L>      05 May 2024 16:08  Phos  3.3     05-05  Mg     1.0     05-05    TPro  4.6<L>  /  Alb  2.2<L>  /  TBili  3.7<H>  /  DBili  x   /  AST  54<H>  /  ALT  32  /  AlkPhos  583<H>  05-05    LIVER FUNCTIONS - ( 05 May 2024 16:08 )  Alb: 2.2 g/dL / Pro: 4.6 g/dL / ALK PHOS: 583 U/L / ALT: 32 U/L / AST: 54 U/L / GGT: x           PT/INR - ( 05 May 2024 15:52 )   PT: 19.9 sec;   INR: 1.84 ratio         PTT - ( 05 May 2024 15:52 )  PTT:34.8 sec  ____________________________________________

## 2024-05-05 NOTE — H&P ADULT - ASSESSMENT
72yo female, pmh HTN, T2DM on ozempic/insulin, CVA history w residual L-sided weakness, orthostatic hypotension, asthma presenting for fevers, chills, rigors, abdominal pain, nausea/vomiting, was found to be hypotensive, labs notable for leukocytosis 31.34, pH 7.22, lactate 7.7 72yo female, pmh HTN, T2DM on ozempic/insulin, CVA history w residual L-sided weakness, orthostatic hypotension, asthma presenting for fevers, chills, rigors, abdominal pain, nausea/vomiting, was found to be hypotensive, labs notable for leukocytosis 31.34, pH 7.22, lactate 7.7, UA positive for bacteria, CT showing colitis, admitted to MICU for septic shock secondary to colitis vs UTI, requiring pressors.     ===Neuro===  AOx4  - currently at baseline, no other issues     #CVA  - history of CVA   - residual L-sided lower extremity weakness     ===Cardiovascular===  #Shock  - Most likely in setting of sepsis vs cardiogenic   - POCUS in ED showed reduced LV systolic function   - will obtain formal echo   - currently on levophed 0.6. Adding vaso and stress steroids 50mg q6     #HTN   - history of HTN   - on lisinopril 40mg and amlodipine 10mg, furosemide 20mg at home   - will hold in setting of septic shock     ===Respiratory===  - On room air, saturating well    #Asthma   - on mometasone-formoterol bid at home     ===GI===  #Colitis   - patient presenting with nausea, non-bilious/non-bloody emesis x 1 day duration  - no diarrhea  - on CT imaging- non-specific colitis     #Hepatic lesions   - 2.1 x 1.8cm lesion with peripheral hyperenhancement in R lobe.   - Other multiple ill-defined low density lesions   - per patient, she is aware of liver lesions, was discussed for biopsy but did not tolerate biopsy   - less likely infectious given prolonged history of liver lesions     ===Renal===  #UTI   - UA positive for bacteria, leukocyte esterase, WBC   - septic shock   - ABG pH 7.09 lactate 9.7   - continue zosyn   - follow up bcx, urine cx     #GERALDINE  - Scr 2.97 on presentation   - unknown baseline   - improved to 2.73 with fluids   - possible prerenal in setting of sepsis   - will obtain urine lytes     ===Infectious Disease===  #Sepsis   - secondary to colitis vs UTI   - in septic shock requiring pressors- on levophed and vaso   - pH 7.07, lactate 9.7   - stress dose steroids solu-cortef 50 q6   - UA positive for bacteria, will follow up ucx and bcx  - CT imaging showing diffuse colitis    - start zosyn and oral vancomycin   - if continues to have worsening lactate/pH --> can add on amikacin     ===Endocrine===  #DM   - patient on lantus 20units and ozempic at home   - can continue lantus 20u and DEL     ===Heme===  - No active issues     DVT PPX:   - heparin sq     Ethics:   Full Code  HCP: Daughter Miranda  74yo female, pmh HTN, T2DM on ozempic/insulin, CVA history w residual L-sided weakness, orthostatic hypotension, asthma presenting for fevers, chills, rigors, abdominal pain, nausea/vomiting, was found to be hypotensive, labs notable for leukocytosis 31.34, pH 7.22, lactate 7.7, UA positive for bacteria, CT showing colitis, admitted to MICU for septic shock secondary to colitis vs UTI, requiring pressors.     ===Neuro===  AOx4  - currently at baseline, no other issues     #CVA  - history of CVA   - residual L-sided lower extremity weakness     ===Cardiovascular===  #Shock  - Most likely in setting of sepsis vs cardiogenic   - POCUS in ED showed reduced LV systolic function   - will obtain formal echo   - currently on levophed 0.6. Adding vaso and stress steroids 50mg q6     #HTN   - history of HTN   - on lisinopril 40mg and amlodipine 10mg, furosemide 20mg at home   - will hold in setting of septic shock     ===Respiratory===  - On room air, saturating well    #Asthma   - on mometasone-formoterol bid at home     ===GI===  #Colitis   - patient presenting with nausea, non-bilious/non-bloody emesis x 1 day duration  - no diarrhea  - on CT imaging- non-specific colitis   - will start oral vanco and flagyl for c.diff (low suspicion)   - follow up gi pcr and c.diff     #Hepatic lesions   - 2.1 x 1.8cm lesion with peripheral hyperenhancement in R lobe.   - Other multiple ill-defined low density lesions   - per patient, she is aware of liver lesions, was discussed for biopsy but did not tolerate biopsy   - less likely infectious given prolonged history of liver lesions     ===Renal===  #UTI   - UA positive for bacteria, leukocyte esterase, WBC   - septic shock   - ABG pH 7.09 lactate 9.7   - continue zosyn   - follow up bcx, urine cx     #GERALDINE  - Scr 2.97 on presentation   - baseline Scr 1.2 on outpatient review   - improved to 2.73 with fluids   - possible prerenal in setting of sepsis   - will obtain urine lytes     ===Infectious Disease===  #Sepsis   - secondary to colitis vs UTI   - in septic shock requiring pressors- on levophed and vaso   - pH 7.07, lactate 9.7   - stress dose steroids solu-cortef 50 q6   - UA positive for bacteria, will follow up ucx and bcx  - CT imaging showing diffuse colitis    - start zosyn and oral vancomycin and flagyl for c.diff (low suspicion)   - if continues to have worsening lactate/pH --> can add on amikacin     ===Endocrine===  #DM   - patient on lantus 20units and ozempic at home   - can continue lantus 20u and DEL   - lantus 10u while NPO     ===Heme===  - No active issues     DVT PPX:   - heparin sq     Ethics:   Full Code  HCP: Daughter Miranda  72yo female, pmh HTN, T2DM on ozempic/insulin, CVA history w residual L-sided weakness, orthostatic hypotension, asthma presenting for fevers, chills, rigors, abdominal pain, nausea/vomiting, was found to be hypotensive, labs notable for leukocytosis 31.34, pH 7.22, lactate 7.7, UA positive for bacteria, CT showing colitis, admitted to MICU for septic shock secondary to colitis vs UTI, requiring pressors.     ===Neuro===  AOx4  - currently at baseline, no other issues     #CVA  - history of CVA   - residual L-sided lower extremity weakness     ===Cardiovascular===  #Shock  - Most likely in setting of sepsis vs cardiogenic   - POCUS in ED showed reduced LV systolic function   - will obtain formal echo   - currently on levophed 0.6. Adding vaso and stress steroids 50mg q6     #HTN   - history of HTN   - on lisinopril 40mg and amlodipine 10mg, furosemide 20mg at home   - will hold in setting of septic shock     ===Respiratory===  - On room air, saturating well    #Asthma   - on mometasone-formoterol bid at home     ===GI===  #Colitis   - patient presenting with nausea, non-bilious/non-bloody emesis x 1 day duration  - no diarrhea  - on CT imaging- non-specific colitis   - will start oral vanco and flagyl for c.diff (low suspicion)   - follow up gi pcr and c.diff     #Hepatic lesions   - 2.1 x 1.8cm lesion with peripheral hyperenhancement in R lobe.   - Other multiple ill-defined low density lesions   - per patient, she is aware of liver lesions, was discussed for biopsy but did not tolerate biopsy   - less likely infectious given prolonged history of liver lesions     ===Renal===  #UTI   - UA positive for bacteria, leukocyte esterase, WBC   - septic shock   - ABG pH 7.09 lactate 9.7   - continue zosyn   - follow up bcx, urine cx     #GERALDINE  - Scr 2.97 on presentation   - baseline Scr 1.2 on outpatient review   - improved to 2.73 with fluids   - possible prerenal in setting of sepsis   - will obtain urine lytes     ===Infectious Disease===  #Sepsis   - secondary to colitis vs UTI   - in septic shock requiring pressors- on levophed and vaso   - pH 7.07, lactate 9.7   - stress dose steroids solu-cortef 50 q6   - UA positive for bacteria, will follow up ucx and bcx  - CT imaging showing diffuse colitis    - start zosyn and oral vancomycin and flagyl for c.diff (low suspicion)   - if continues to have worsening lactate/pH --> can add on amikacin     ===Endocrine===  #DM   - patient on lantus 20units and ozempic at home   - can continue lantus 20u and DEL   - lantus 10u while NPO     ===Heme===  - Thrombocytopenia   - presentation 60 --> 77   - on outpatient review, normal plt 195   - likely in setting of sepsis vs liver dysfunction   - will continue to monitor     DVT PPX:   - heparin sq     Ethics:   Full Code  HCP: Angel Luis Jean  72yo female, pmh HTN, T2DM on ozempic/insulin, CVA history w residual L-sided weakness, orthostatic hypotension, asthma presenting for fevers, chills, rigors, abdominal pain, nausea/vomiting, was found to be hypotensive, labs notable for leukocytosis 31.34, pH 7.22, lactate 7.7, UA positive for bacteria, CT showing colitis, admitted to MICU for septic shock secondary to colitis vs UTI, requiring pressors.     ===Neuro===  AOx4  - currently at baseline, no other issues     #CVA  - history of CVA   - residual L-sided lower extremity weakness     ===Cardiovascular===  #Shock  - Most likely in setting of sepsis vs cardiogenic   - POCUS in ED showed reduced LV systolic function   - will obtain formal echo   - currently on levophed 0.6. Adding vaso and stress steroids 50mg q6     #HTN   - history of HTN   - on lisinopril 40mg and amlodipine 10mg, furosemide 20mg at home   - will hold in setting of septic shock     ===Respiratory===  - On room air, saturating well    #Asthma   - on mometasone-formoterol bid at home     ===GI===  #Colitis   - patient presenting with nausea, non-bilious/non-bloody emesis x 1 day duration  - no diarrhea  - on CT imaging- non-specific colitis   - will start oral vanco and flagyl for c.diff (low suspicion)   - follow up gi pcr and c.diff     #Hepatic lesions   - 2.1 x 1.8cm lesion with peripheral hyperenhancement in R lobe.   - Other multiple ill-defined low density lesions   - per patient, she is aware of liver lesions, was discussed for biopsy but did not tolerate biopsy   - less likely infectious given prolonged history of liver lesions  - MRI when more stable     ===Renal===  #UTI   - UA positive for bacteria, leukocyte esterase, WBC   - septic shock   - ABG pH 7.09 lactate 9.7   - continue zosyn   - follow up bcx, urine cx     #GERALDINE  - Scr 2.97 on presentation   - baseline Scr 1.2 on outpatient review   - improved to 2.73 with fluids   - possible prerenal in setting of sepsis   - will obtain urine lytes     ===Infectious Disease===  #Sepsis   - secondary to colitis vs UTI   - in septic shock requiring pressors- on levophed and vaso   - pH 7.07, lactate 9.7   - stress dose steroids solu-cortef 50 q6   - UA positive for bacteria, will follow up ucx and bcx  - CT imaging showing diffuse colitis    - start zosyn and oral vancomycin and flagyl for c.diff (low suspicion)   - if continues to have worsening lactate/pH --> can add on amikacin     ===Endocrine===  #DM   - patient on lantus 20units and ozempic at home   - can continue lantus 20u and DEL   - lantus 10u while NPO     ===Heme===  - Thrombocytopenia   - presentation 60 --> 77   - on outpatient review, normal plt 195   - likely in setting of sepsis vs liver dysfunction   - will continue to monitor     DVT PPX:   - heparin sq     Ethics:   Full Code  HCP: Daughter Miranda  72yo female, pmh HTN, T2DM on ozempic/insulin, CVA history w residual L-sided weakness, orthostatic hypotension, asthma presenting for fevers, chills, rigors, abdominal pain, nausea/vomiting, was found to be hypotensive, labs notable for leukocytosis 31.34, pH 7.22, lactate 7.7, UA positive for bacteria, CT showing colitis, admitted to MICU for septic shock secondary to colitis vs UTI, requiring pressors.     ===Neuro===  AOx4  - currently at baseline, no other issues     #CVA  - history of CVA   - residual L-sided lower extremity weakness     ===Cardiovascular===  #Shock  - Most likely in setting of sepsis vs cardiogenic   - POCUS in ED showed reduced LV systolic function   - will obtain formal echo   - currently on levophed 0.6. Adding vaso and stress steroids 50mg q6     #HTN   - history of HTN   - on lisinopril 40mg and amlodipine 10mg, furosemide 20mg at home   - will hold in setting of septic shock     ===Respiratory===  - On room air, saturating well    #Asthma   - on mometasone-formoterol bid at home     ===GI===  #Colitis   - patient presenting with nausea, non-bilious/non-bloody emesis x 1 day duration  - no diarrhea  - on CT imaging- non-specific colitis   - will start oral vanco and flagyl for c.diff (low suspicion)   - follow up gi pcr and c.diff     #Hepatic lesions   - 2.1 x 1.8cm lesion with peripheral hyperenhancement in R lobe.   - Other multiple ill-defined low density lesions   - per patient, she is aware of liver lesions, was discussed for biopsy but did not tolerate biopsy   - less likely infectious given prolonged history of liver lesions  - MRI when more stable     ===Renal===  #UTI   - UA positive for bacteria, leukocyte esterase, WBC   - septic shock   - ABG pH 7.09 lactate 9.7   - continue zosyn   - follow up bcx, urine cx     #GERALDINE  - Scr 2.97 on presentation   - baseline Scr 1.2 on outpatient review   - improved to 2.73 with fluids   - possible prerenal in setting of sepsis   - will obtain urine lytes   - nephrology consult, patient is anuric     ===Infectious Disease===  #Sepsis   - secondary to colitis vs UTI   - in septic shock requiring pressors- on levophed and vaso   - pH 7.07, lactate 9.7   - stress dose steroids solu-cortef 50 q6   - UA positive for bacteria, will follow up ucx and bcx  - CT imaging showing diffuse colitis    - start zosyn and oral vancomycin and flagyl for c.diff (low suspicion)   - if continues to have worsening lactate/pH --> can add on amikacin     ===Endocrine===  #DM   - patient on lantus 20units and ozempic at home   - can continue lantus 20u and DEL   - lantus 10u while NPO     ===Heme===  - Thrombocytopenia   - presentation 60 --> 77   - on outpatient review, normal plt 195   - likely in setting of sepsis vs liver dysfunction   - will continue to monitor     DVT PPX:   - heparin sq     Ethics:   Full Code  HCP: Angel Luis Jean  74yo female, pmh HTN, T2DM on ozempic/insulin, CVA history w residual L-sided weakness, orthostatic hypotension, asthma presenting for fevers, chills, rigors, abdominal pain, nausea/vomiting, was found to be hypotensive, labs notable for leukocytosis 31.34, pH 7.22, lactate 7.7, UA positive for bacteria, CT showing colitis, admitted to MICU for septic shock secondary to colitis vs UTI, requiring pressors.     ===Neuro===  AOx4  - currently at baseline, no other issues     #CVA  - history of CVA   - residual L-sided lower extremity weakness     ===Cardiovascular===  #Shock  - Most likely in setting of sepsis vs cardiogenic   - POCUS in ED showed reduced LV systolic function   - will obtain formal echo   - currently on levophed 0.6. Adding vaso and stress steroids 50mg q6     #HTN   - history of HTN   - on lisinopril 40mg and amlodipine 10mg, furosemide 20mg at home   - will hold in setting of septic shock     ===Respiratory===  - On room air, saturating well    #Asthma   - on mometasone-formoterol bid at home     ===GI===  #Colitis   - patient presenting with nausea, non-bilious/non-bloody emesis x 1 day duration  - no diarrhea  - on CT imaging- non-specific colitis   - will start oral vanco and flagyl for c.diff (low suspicion)   - follow up gi pcr and c.diff     #Hepatic lesions   - 2.1 x 1.8cm lesion with peripheral hyperenhancement in R lobe.   - Other multiple ill-defined low density lesions   - per patient, she is aware of liver lesions, was discussed for biopsy but did not tolerate biopsy   - less likely infectious given prolonged history of liver lesions  - MRI when more stable and potentially IR for liver biopsy   - will send for echinococcus and entamoeba     ===Renal===  #UTI   - UA positive for bacteria, leukocyte esterase, WBC   - septic shock   - ABG pH 7.09 lactate 9.7   - continue zosyn   - follow up bcx, urine cx     #GERALDINE  - Scr 2.97 on presentation   - baseline Scr 1.2 on outpatient review   - improved to 2.73 with fluids   - possible prerenal in setting of sepsis   - will obtain urine lytes   - nephrology consult, patient is anuric     #Lactic acidosis  - pH 7.07, lactate 9.7   - most likely in setting of sepsis   - will start bicarb gtt     ===Infectious Disease===  #Sepsis   - secondary to colitis vs UTI   - in septic shock requiring pressors- on levophed and vaso   - pH 7.07, lactate 9.7   - stress dose steroids solu-cortef 50 q6   - UA positive for bacteria, will follow up ucx and bcx  - CT imaging showing diffuse colitis    - start zosyn and oral vancomycin and flagyl for c.diff (low suspicion)   - if continues to have worsening lactate/pH --> can add on amikacin     ===Endocrine===  #DM   - patient on lantus 20units and ozempic at home   - can continue lantus 20u and DEL   - lantus 10u while NPO     ===Heme===  - Thrombocytopenia   - presentation 60 --> 77   - on outpatient review, normal plt 195   - likely in setting of sepsis vs liver dysfunction   - will continue to monitor     DVT PPX:   - heparin sq     Ethics:   Full Code  HCP: Angel Luis Jean  74yo female, pmh HTN, T2DM on ozempic/insulin, CVA history w residual L-sided weakness, orthostatic hypotension, asthma presenting for fevers, chills, rigors, abdominal pain, nausea/vomiting, was found to be hypotensive, labs notable for leukocytosis 31.34, pH 7.22, lactate 7.7, UA positive for bacteria, CT showing colitis, admitted to MICU for septic shock secondary to colitis vs UTI, requiring pressors.     ===Neuro===  AOx4  - currently at baseline, no other issues     #CVA  - history of CVA   - residual L-sided lower extremity weakness     ===Cardiovascular===  #Shock  - Most likely in setting of sepsis vs cardiogenic   - POCUS in ED showed reduced LV systolic function   - will obtain formal echo   - currently on levophed 0.6. Adding vaso and stress steroids 50mg q6     #HTN   - history of HTN   - on lisinopril 40mg and amlodipine 10mg, furosemide 20mg at home   - will hold in setting of septic shock     ===Respiratory===  - On room air, saturating well    #Asthma   - on mometasone-formoterol bid at home     ===GI===  #Colitis   - patient presenting with nausea, non-bilious/non-bloody emesis x 1 day duration  - no diarrhea  - on CT imaging- non-specific diffuse colitis   - will start oral vanco and flagyl for c.diff (low suspicion)   - follow up gi pcr and c.diff     #Hepatic lesions   - 2.1 x 1.8cm lesion with peripheral hyperenhancement in R lobe.   - Other multiple ill-defined low density lesions   - per patient, she is aware of liver lesions, was discussed for biopsy but did not tolerate biopsy   - abscess vs malignancy    - MRI when more stable and potentially IR for liver biopsy   - will send for echinococcus and entamoeba     ===Renal===  #UTI   - UA positive for bacteria, leukocyte esterase, WBC   - septic shock   - ABG pH 7.09 lactate 9.7   - continue zosyn   - follow up bcx, urine cx     #GERALDINE  - Scr 2.97 on presentation   - baseline Scr 1.2 on outpatient review   - improved to 2.73 with fluids   - possible prerenal in setting of sepsis   - will obtain urine lytes   - nephrology consult, patient is anuric     #Lactic acidosis  - pH 7.07, lactate 9.7   - most likely in setting of sepsis   - will start bicarb gtt     ===Infectious Disease===  #Sepsis   - secondary to colitis vs UTI   - in septic shock requiring pressors- on levophed and vaso   - pH 7.07, lactate 9.7   - stress dose steroids solu-cortef 50 q6   - UA positive for bacteria, will follow up ucx and bcx  - CT imaging showing diffuse colitis    - start zosyn and oral vancomycin and flagyl for c.diff (low suspicion)   - if continues to have worsening lactate/pH --> can add on amikacin   - for colitis, surgery consulted     ===Endocrine===  #DM   - patient on lantus 20units and ozempic at home   - can continue lantus 20u and DEL   - lantus 10u while NPO     ===Heme===  - Thrombocytopenia   - presentation 60 --> 77   - on outpatient review, normal plt 195   - likely in setting of sepsis vs liver dysfunction   - will continue to monitor     DVT PPX:   - heparin sq     Ethics:   Full Code  HCP: Daughter Miranda

## 2024-05-05 NOTE — H&P ADULT - NSHPPHYSICALEXAM_GEN_ALL_CORE
PHYSICAL EXAM:  GENERAL: NAD, well-groomed, well-developed  HEAD:  Atraumatic, Normocephalic  EYES: EOMI, PERRLA, conjunctiva and sclera clear  ENMT: No tonsillar erythema, exudates, or enlargement; Moist mucous membranes  NECK: Supple, No JVD, Normal thyroid  HEART: Regular rate and rhythm; No murmurs, rubs, or gallops  RESPIRATORY: CTA B/L, No W/R/R  ABDOMEN: Soft,diffuse tenderness to palpation, Nondistended; no guarding or rebound tenderness. Bowel sounds present  NEUROLOGY: A&Ox3, nonfocal, moving all extremities  EXTREMITIES:  2+ Peripheral Pulses, No clubbing, cyanosis, or edema  SKIN: warm, dry, normal color, no rash or abnormal lesions

## 2024-05-05 NOTE — ED PROVIDER NOTE - ATTENDING CONTRIBUTION TO CARE
Attending MD Mirtha Amanda:  I personally have seen and examined this patient.  Resident note reviewed and agree on plan of care and except where noted.  See HPI, PE, and MDM for details.

## 2024-05-05 NOTE — ED ADULT NURSE REASSESSMENT NOTE - NS ED NURSE REASSESS COMMENT FT1
Blanco catheter (with temperature probe) placed at bedside with two RNs to maintain sterility. Pt draining 50cc's of yellow, non-odorous cloudy urine. Genital area clean, dry, and intact. A-line attempted at bedside by ED provider but unsuccessful. Daughter remains at bedside. Levophed running at 0.5 mcg/kg/min. Safety and comfort measures maintained.

## 2024-05-05 NOTE — ED PROVIDER NOTE - PHYSICAL EXAMINATION
Vitals: I have reviewed the patients vital signs  General: lethargic   HEENT: Atraumatic, normocephalic, airway patent  Eyes: EOMI, tracking appropriately  Neck: no tracheal deviation  Chest/Lungs: poor inspiratory efforts   Heart: +warm extremities, tachycardia, no LE edema/swelling  Abdomen: soft, suprapubic tenderness   Neuro: A+Ox3, CN grossly intact   MSK: strength at baseline in all extremities  Skin: no cyanosis, no jaundice, no new emergent lesions

## 2024-05-05 NOTE — ED ADULT NURSE REASSESSMENT NOTE - NS ED NURSE REASSESS COMMENT FT1
Central line placed at bedside by ED providers. Report given to MICU. MICU aware of IV infiltration and need for reversal agent in left AC. Daughter remains at bedside. VSS. Safety and comfort measures maintained.

## 2024-05-05 NOTE — CONSULT NOTE ADULT - PROBLEM SELECTOR RECOMMENDATION 2
Pt. with GERALDINE in the setting of volume depletion 2/2 n/v/d, hypotension, colitis, and hydronephrosis. Pt. also has a history of uncontrolled DM2. She was started on Ozempic in December 2023 with n/v/d/. Last dose was ~10 days prior to admission. On review of labs on [place], last Scr was WNL at 0.76 on 7/12/2019. Scr on admission (5/5) was elevated at 2.97 and remains elevated/stable at 2.57 on repeat. UA is turbid, 100mg of protein, large LE, large blood, >998 WBC, and 6 RBCs. UOP documented as 10ccs. UPCR elevated at 1.5. Pt. received IV contrast on 5/5. CPK not significantly elevated. BNP significantly elevated at 47,217. Ionized Ca WNL. CT abd/pelv shows mild right hydroureteronephrosis. Pt. hemodynamically unstable on 2 IV vasopressors. Pt. likely in ATN. Agree with IV bicarb infusion at this time. Plan to initiate on CRRT. HD consent obtained from daughter and placed in the chart. Please replete Mg to goal of ~2. Monitor BP and labs.    Discussed with attending on call.   If you have any questions, please feel free to contact me.  Ronnie Stearns MD  Nephrology Fellow  [id number] / [employer] Teams (Preferred)  (After 4pm or on weekends, please call the on-call Fellow) Pt. with GERALDINE in the setting of volume depletion 2/2 n/v/d, hypotension, colitis, and hydronephrosis. Pt. also has a history of uncontrolled DM2. She was started on Ozempic in December 2023 with n/v/d/. Last dose was ~10 days prior to admission. On [facility] [place], last Scr was WNL at 0.76 on 7/12/2019. Scr on admission (5/5) was elevated at 2.97 and remains elevated/stable at 2.57 on repeat. UA is turbid, 100mg of protein, large LE, large blood, >998 WBC, and 6 RBCs. UOP documented as 10ccs. UPCR elevated at 1.5. Pt. received IV contrast on 5/5. CPK not significantly elevated. BNP significantly elevated at 47,217. Ionized Ca WNL. CT abd/pelv shows mild right hydroureteronephrosis. Pt. hemodynamically unstable on 2 IV vasopressors. Pt. likely in ATN. Agree with IV bicarb infusion at this time. Plan to initiate on CRRT. HD consent obtained from daughter and placed in the chart. Please replete Mg to goal of ~2. Monitor BP and labs. Dose medications for eGFR of 50.     Discussed with attending on call.   If you have any questions, please feel free to contact me.  Ronnie Stearns MD  Nephrology Fellow  [id number] / [employer] Teams (Preferred)  (After 4pm or on weekends, please call the on-call Fellow)

## 2024-05-05 NOTE — CONSULT NOTE ADULT - ASSESSMENT
72yo female, pmh HTN, T2DM on ozempic/insulin, CVA history w residual L-sided weakness, orthostatic hypotension, asthma presenting for fevers, chills, rigor, abdominal pain, nausea, and vomiting x 1 day duration.     #Pancolitis   #Lactic acidosis   #Sepsis   #UTI     Recommendations:  - Perform flex sig today to assess mucosa of bowel   - MICU to provide sedation   - Surgery to follow up pending findings     Judith Lopez MD  Gastroenterology/Hepatology Fellow, PGY-4  Please contact via TEAMS    NON-URGENT CONSULTS:  Please email [email or web address]  [email or web address]

## 2024-05-05 NOTE — PATIENT PROFILE ADULT - FALL HARM RISK - HARM RISK INTERVENTIONS

## 2024-05-05 NOTE — CONSULT NOTE ADULT - ATTENDING COMMENTS
73F with PMH of HTN, HLD, DM2, asthma, and CVA who presented to the ED due to ~1 week of fevers/chills and n/v/d. Nephrology consulted for severe metabolic acidosis, GERALDINE, and anuria.   On review of labs on [place], last Scr was WNL at 0.76 on 7/12/2019. Scr on admission (5/5) was elevated at 2.97 and remains elevated/stable at 2.57 on repeat. HCO3 was 16 and had decreased to <10. Seen in am at bedside.  CRRT initiated, intubated unresponsive with family at bedside  1. ARF--multifactorial hypoperfusion now CRRT dependent.  Orders reviewed with critical care RN and fellow.  filter parameters reviewed.  Clearance appropriate with K, PO4 OK  2.  Acidosis--mostly lactic which is NOT cleared substantially with CRRT.  Supplemental HCO3 to keep pH >7.1  3.  Hypotension--multipressor.  Poor perfusion reflected in 2  4.  Respiratory failure acute--aim to keep FIO2< 60% but 2 limits volume removal necessary for improving lung compliance    discussed with MICU team, attending  Bernardino Tucker MD  contact me on TEAMS 73F with PMH of HTN, HLD, DM2, asthma, and CVA who presented to the ED due to ~1 week of fevers/chills and n/v/d. Nephrology consulted for severe metabolic acidosis, GERALDINE, and anuria.   On review of labs on [place], last Scr was WNL at 0.76 on 7/12/2019. Scr on admission (5/5) was elevated at 2.97 and remains elevated/stable at 2.57 on repeat. HCO3 was 16 and had decreased to <10. Seen 5/6 am at bedside.  CRRT initiated, intubated unresponsive with family at bedside  1. ARF--multifactorial hypoperfusion now CRRT dependent.  Orders reviewed with critical care RN and fellow.  filter parameters reviewed.  Clearance appropriate with K, PO4 OK  2.  Acidosis--mostly lactic which is NOT cleared substantially with CRRT.  Supplemental HCO3 to keep pH >7.1  3.  Hypotension--multipressor.  Poor perfusion reflected in 2  4.  Respiratory failure acute--aim to keep FIO2< 60% but 2 limits volume removal necessary for improving lung compliance    discussed with MICU team, attending  Bernardino Tucker MD  contact me on TEAMS

## 2024-05-05 NOTE — PROCEDURE NOTE - NSPROCDETAILS_GEN_ALL_CORE
guidewire recovered/lumen(s) aspirated and flushed/sterile dressing applied
location identified, draped/prepped, sterile technique used, needle inserted/introduced/positive blood return obtained via catheter/connected to a pressurized flush line/sutured in place/Seldinger technique/all materials/supplies accounted for at end of procedure
patient pre-oxygenated, tube inserted, placement confirmed

## 2024-05-05 NOTE — ED PROCEDURE NOTE - PROCEDURE ADDITIONAL DETAILS
unable to pass catheter in left and right femoral artery unable to pass catheter in left and right femoral artery. wire removed and pressure held

## 2024-05-05 NOTE — ED ADULT NURSE NOTE - OBJECTIVE STATEMENT
72 yo Female with PMH stroke (7 years ago), HTN, and DM predominantly [place] speaking BIB EMS presents to the ED accompanied by her daughter with report of a fall last night around 11 pm while walking to the restroom. Per EMS, pt was walking to bathroom and fell to the ground with unknown headstrike or LOC. Per daughter, pt began having slurred speech this AM. Upon arrival, pt is hypotensive, levophed started at .5 mcg/kg/min. Upon assessment, pt is A&Ox3, speaking in full sentences, denies CP or SOB, abd soft and nontender upon palpation, pt ambulatory with cane at baseline. Pt resides at home with nursing aid. Bed locked in lowest position, safety and comfort measures maintained. 74 yo Female with PMH stroke (7 years ago), HTN, and DM predominantly [place] sor[place] speaking BIB EMS presents to the ED accompanied by her daughter with report of a fall last night around 11 pm while walking to the restroom. Per EMS, pt was walking to bathroom and fell to the ground with unknown headstrike or LOC. Per daughter, pt began having increased slurred speech this AM, has slurred speech at baseline. Upon arrival, pt is hypotensive, levophed started at .5 mcg/kg/min. Upon assessment, pt is A&Ox3, speaking in full sentences, denies CP or SOB, abd soft and nontender upon palpation, pt ambulatory with cane at baseline. Pt resides at home with nursing aid. Bed locked in lowest position, safety and comfort measures maintained.

## 2024-05-05 NOTE — H&P ADULT - NSICDXPASTMEDICALHX_GEN_ALL_CORE_FT
PAST MEDICAL HISTORY:  Asthma     CVA (cerebral vascular accident)     Diabetes     HLD (hyperlipidemia)     HTN (hypertension)     Orthostatic hypotension

## 2024-05-05 NOTE — AIRWAY PLACEMENT NOTE ADULT - AIRWAY COMMENTS:
Pt intubated in MICU. Class 2 mouth opening with Grade 2 view. No complications on tube insertion. Pt placed on mechanical ventilator post intubation w/ CXR pending. Attending Dr. Lundberg present at bedside for intubation.

## 2024-05-05 NOTE — ED ADULT NURSE NOTE - NSFALLRISKINTERV_ED_ALL_ED
Communicate fall risk and risk factors to all staff, patient, and family/Provide visual cue: yellow wristband, yellow gown, etc/Reinforce activity limits and safety measures with patient and family/Call bell, personal items and telephone in reach/Instruct patient to call for assistance before getting out of bed/chair/stretcher/Non-slip footwear applied when patient is off stretcher/[place] to call system/Physically safe environment - no spills, clutter or unnecessary equipment/Purposeful Proactive Rounding/Room/bathroom lighting operational, light cord in reach Assistance OOB with selected safe patient handling equipment if applicable/Communicate fall risk and risk factors to all staff, patient, and family/Monitor gait and stability/Provide patient with walking aids/Provide visual cue: yellow wristband, yellow gown, etc/Reinforce activity limits and safety measures with patient and family/Call bell, personal items and telephone in reach/Instruct patient to call for assistance before getting out of bed/chair/stretcher/Non-slip footwear applied when patient is off stretcher/[place] to call system/Physically safe environment - no spills, clutter or unnecessary equipment/Purposeful Proactive Rounding/Room/bathroom lighting operational, light cord in reach

## 2024-05-05 NOTE — ED PROVIDER NOTE - OBJECTIVE STATEMENT
Patient is a 73 year-old-male with history of CVA w/residual L sided weakness and slurred speech, DM on ozempic/insulin presents with hypotension, slurred speech and fall. Brought in by EMS. Per EMS, patient usually lives alone but has an aide who saw her at 4pm yesterday and was well, then found her to be on the floor this morning. Patient states that she felt weak and fell to the ground. C/o fever/chills in the last few days with nausea and itchiness with urine.

## 2024-05-05 NOTE — ED PROVIDER NOTE - PROGRESS NOTE DETAILS
Attending Mirtha Amanda: awaiting micu repeat vbg showed sig drop in hemoglobin. will repeat Attending Mirtha Amanda: Patient with labile blood pressures upon high-dose levo.  Consented family member to obtain an arterial line to further monitor patient's blood pressures.  Femoral artery visualized his radial artery was very small.  Local lidocaine was given for anesthesia.  Needle was inserted into the femoral artery with good blood return.  Wire was inserted.  Upon attempting to place the catheter had difficulty with threading the catheter with significant resistance.  Small nick made in the skin to try to facilitate placement of the tube with persistent resistance.  Decision made to abort as concern for issues with threading the arterial line.  Attempted on the opposing side with similar issues.  Patient's blood pressure is improving on higher dose Levophed.  Spoke with radiology to report seeing evidence of hydronephrosis but per radiology resident no evidence of any septic stone at this time.  Patient with significant leukocytosis as well as bandemia.  Concern for mixed picture of cardiogenic and septic shock.  Patient with 3 large IVs in place.  Initially had levo running through peripheral on her left AC however did extravasate.  Noticed right away as was working prior to CT and when return to CT noticed hematoma.  MICU is aware.  IV catheter was removed and cold compresses applied.  Neurovascularly patient is intact. Attending Mirtha Amanda: CT show evidence of right-sided hydronephrosis without any clear evidence of stone.  CT also shows evidence of colitis.  Per family member patient has been taking Ozempic.  No recent loose stools or black or bloody stools.  Repeat blood work sent.  GI PCR as well as C. difficile ordered

## 2024-05-05 NOTE — CONSULT NOTE ADULT - ASSESSMENT
ASSESSMENT:  Miranda Godoy is a 73 y.o. woman with history of HTN, HLD, DM, CVA w/ residual left-sided motor deficits, diverticulitis s/p colectomy, cholecystectomy, and caesarian section who presented to the ED on 5/5 with AMS and significant hypotension.     Etiology of hypotension remains unclear. Given hydronephrosis and dark, sedimented urine, may be urosepsis. Surgical consultation placed to evaluate for ischemic colitis. Abdomen is benign without peritonitis at time of initial evaluation.       PLAN:  - No emergent surgical intervention at this time  - Serial abdominal exams  - Trend lactate; still elevated at 7  - Recommend GI studies: c. diff and GI PCR  - Plan discussed with Dr. Jigar Rasmussen ASSESSMENT:  Miranda Godoy is a 73 y.o. woman with history of HTN, HLD, DM, CVA w/ residual left-sided motor deficits, diverticulitis s/p colectomy, cholecystectomy, and caesarian section who presented to the ED on 5/5 with AMS and significant hypotension.     Etiology of hypotension remains unclear. Given hydronephrosis and dark, sedimented urine, may be urosepsis. Surgical consultation placed to evaluate for ischemic colitis. Abdomen is benign without peritonitis at time of initial evaluation.       PLAN:  - No emergent surgical intervention at this time  - Serial abdominal exams  - Trend lactate; still elevated at 7  - Recommend GI studies: c. diff and GI PCR  - GI consultation for possible colonoscopy  - If unable to obtain colonoscopy, would offer diagnostic laparoscopy vs exploratory laparotomy to r/o colonic ischemia  - Plan discussed with Dr. Jigar Rasmussen

## 2024-05-05 NOTE — PATIENT PROFILE ADULT - FUNCTIONAL ASSESSMENT - DAILY ACTIVITY 1.
Subjective   Patient ID: Eladio is a 20 year old male.    Chief Complaint   Patient presents with   • Physical               HPI  He is a patient who comes in today for evaluation mainly for general physical examination also wanted STD screening done does not have any symptoms related to STDs    Is a past medical history denies any history of known medical problem past  Past surgical history patient describes having history of arthroscopic surgery on June 18 no residual pain at this time  Social history denies smoking denies taking alcohol denies take any recreational drugs is single and currently unemployed  Family history significant for prostate cancer in father mother is living his diabetes describes his siblings to be healthy    Review of Systems   Constitutional: Negative.    HENT: Negative.    Eyes: Negative.    Respiratory: Negative.    Cardiovascular: Negative.    Gastrointestinal: Negative.    Endocrine: Negative.    Genitourinary: Negative.    Musculoskeletal: Negative.    Skin: Negative.    Allergic/Immunologic: Negative.    Neurological: Negative.    Hematological: Negative.    Psychiatric/Behavioral: Negative.        Current Medications    FLUTICASONE (FLONASE) 50 MCG/ACT NASAL SPRAY    Spray 2 sprays in each nostril daily.    MONTELUKAST (SINGULAIR) 10 MG TABLET    TAKE 1 TABLET BY MOUTH EVERY DAY AT NIGHT    OFLOXACIN (FLOXIN) 0.3 % OTIC SOLUTION        PREDNISONE (DELTASONE) 20 MG TABLET    Take 2 tablets by mouth daily.       Relevant past med history  No past medical history on file.    Past Surgical History:   Procedure Laterality Date   • Shoulder surgery Left 06/18/2020     Social History     Socioeconomic History   • Marital status: Single     Spouse name: Not on file   • Number of children: Not on file   • Years of education: Not on file   • Highest education level: Not on file   Occupational History   • Not on file   Tobacco Use   • Smoking status: Never Smoker   • Smokeless tobacco: Never  Used   Vaping Use   • Vaping Use: Some days   • Substances: Nicotine, THC, Flavoring   Substance and Sexual Activity   • Alcohol use: Yes     Comment: occas   • Drug use: Not Currently   • Sexual activity: Yes     Partners: Female     Birth control/protection: Condom   Other Topics Concern   •  Service No   • Blood Transfusions No   • Caffeine Concern No   • Occupational Exposure No   • Hobby Hazards No   • Sleep Concern No   • Stress Concern No   • Weight Concern No   • Special Diet No   • Back Care No   • Exercise Yes   • Bike Helmet No   • Seat Belt Yes   • Self-Exams No   Social History Narrative   • Not on file     Social Determinants of Health     Financial Resource Strain: Not on file   Food Insecurity: Not on file   Transportation Needs: Not on file   Physical Activity: Not on file   Stress: Not on file   Social Connections: Not on file   Intimate Partner Violence: Not on file       ALLERGIES:  No Known Allergies  No family history on file.    Examination  Objective   Visit Vitals  /71 (BP Location: LUE - Left upper extremity, Patient Position: Sitting, Cuff Size: Regular)   Pulse 73   Temp 98.2 °F (36.8 °C) (Temporal)   Resp 18   Wt 86.2 kg (190 lb)   SpO2 100%   BMI 23.75 kg/m²     Physical Exam  Vitals reviewed.   Constitutional:       Appearance: He is well-developed.   HENT:      Head: Normocephalic and atraumatic.      Right Ear: External ear normal.      Left Ear: External ear normal.      Nose: Nose normal.      Mouth/Throat:      Mouth: Mucous membranes are moist.      Neck: Normal range of motion and neck supple.   Eyes:      Conjunctiva/sclera: Conjunctivae normal.      Pupils: Pupils are equal, round, and reactive to light.   Neck:      Trachea: No tracheal deviation.   Cardiovascular:      Rate and Rhythm: Normal rate and regular rhythm.      Pulses: Normal pulses.      Heart sounds: Normal heart sounds.   Pulmonary:      Effort: Pulmonary effort is normal.      Breath sounds:  Normal breath sounds.   Abdominal:      General: Abdomen is flat. Bowel sounds are normal.      Palpations: Abdomen is soft.      Tenderness: There is no abdominal tenderness.   Genitourinary:     Comments: External genitalia examination is normal  Musculoskeletal:         General: Normal range of motion.   Skin:     General: Skin is warm.      Capillary Refill: Capillary refill takes less than 2 seconds.   Neurological:      General: No focal deficit present.      Mental Status: He is alert and oriented to person, place, and time.   Psychiatric:         Mood and Affect: Mood normal.         Behavior: Behavior normal.         Thought Content: Thought content normal.       Breast exam/chest wall palpation  Assessment   Problem List Items Addressed This Visit    None     Visit Diagnoses     Annual physical exam    -  Primary    Relevant Orders    COMPREHENSIVE METABOLIC PANEL    CBC WITH DIFFERENTIAL    LIPID PANEL WITH REFLEX    URINALYSIS & REFLEX MICROSCOPY WITH CULTURE IF INDICATED    VITAMIN D -25 HYDROXY    THYROID STIMULATING HORMONE    Possible exposure to STD        Relevant Orders    CHLAMYDIA/GC BY NUCLEIC ACID AMPLIFICATION    HEPATITIS SEROLOGY PANEL ACUTE WITH REFLEX HCV PCR    HIV ANTIGEN/ANTIBODY SCREEN    RPR (RAPID PLASMA REAGIN) TRADITIONAL SYPHILIS SCREEN ALGORITHM    HERPES SIMPLEX VIRUS 2 GLYCOPROTEIN        Preventative screening lab work is ordered will review results of those if any changes in plan patient be informed  Also STD screening is ordered will review results of those if any changes or concerns inform  I recommended patient to follow-up in clinic as needed        Plan discussed with patient who verbalized understanding the plan and agrees        Dictation Disclaimer  Please note that computer voice recognition software was used for dictation in the composition of this patient's chart. While every effort is made to ensure correction of grammatical,  other interpretive errors,  occasional dictation errors may be missed during proofreading.    3 = A little assistance

## 2024-05-05 NOTE — ED ADULT TRIAGE NOTE - CHIEF COMPLAINT QUOTE
slurred speech and fall last night. LKW well 4p yesterday. patient direct to room for assessment due to BP

## 2024-05-05 NOTE — ED PROVIDER NOTE - CLINICAL SUMMARY MEDICAL DECISION MAKING FREE TEXT BOX
Patient is a 73 year-old-male with history of CVA w/residual L sided weakness and slurred speech, DM on ozempic/insulin presents with hypotension, slurred speech and fall. Brought in by EMS. Per EMS, patient usually lives alone but has an aide who saw her at 4pm yesterday and was well, then found her to be on the floor this morning. Patient states that she felt weak and fell to the ground. C/o fever/chills in the last few days with nausea and itchiness with urine. On exam patient appears lethargic with mild tenderness noted in the suprapubic region. Patient found to be hypotensive to 60/40s, manual SBP 60s. Concerning for mix picture shock vs septic vs cardiogenic. Workup includes labs, blood culture, UA/UC, CT head and abdomen/pelvis. Will give 1L NS and reassess. Levo initiated BP support. Discussed with daughter at bedside and she reports that patient has been having nausea/vomiting for a while now and she think it is due to ozempic. Daughter noted no other acute changes in the last few days. Patient is a 73 year-old-male with history of CVA w/residual L sided weakness and slurred speech, DM on ozempic/insulin presents with hypotension, slurred speech and fall. Brought in by EMS. Per EMS, patient usually lives alone but has an aide who saw her at 4pm yesterday and was well, then found her to be on the floor this morning. Patient states that she felt weak and fell to the ground. C/o fever/chills in the last few days with nausea and itchiness with urine. On exam patient appears lethargic with mild tenderness noted in the suprapubic region. Patient found to be hypotensive to 60/40s, manual SBP 60s. Concerning for mix picture shock vs septic vs cardiogenic. Workup includes labs, blood culture, UA/UC, CT head and abdomen/pelvis. Will give 1L NS and reassess. Levo initiated BP support. Discussed with daughter at bedside and she reports that patient has been having nausea/vomiting for a while now and she think it is due to ozempic. Daughter noted no other acute changes in the last few days.  Attending Mirtha Amanda: 73-year-old female with history of CVA in the past, diabetes on insulin as well as Ozempic presenting with concern for not feeling well having some slurred speech and reportedly had a fall.  Upon arrival patient was found to be hypotensive but awake and alert.  On exam patient awake and moving all of her extremities.  Abdomen is soft with tenderness to palpation to the left lower quadrant as well as the left CVA.  Point-of-care ultrasound performed upon arrival with concern for hypotension showing mildly reduced ejection fraction IVC approximately 1.7 cm with small amount of respiratory variation, A-line predominant lung fields, right-sided hydronephrosis, and areas of bowel wall thickening.  No significant bruising on exam and mechanism atypical for traumatic cause and hemorrhagic shock.  Concern for septic shock with concern for right-sided hydronephrosis.  Patient given broad-spectrum antibiotics and multiple large-bore IVs in place.  Patient started on IV fluids.  With concern for right-sided hydronephrosis consider possible septic stone and CT scans ordered to further evaluate.  Patient also found to have evidence of bowel wall thickening in the left lower quadrant.  Patient with history of diverticulitis in the past and prior partial colectomy.  Additionally with concern for significant hypotension and bowel thickening concern for possible ischemic colitis as well.  Patient given IV fluids with some improvement in her blood pressure however patient persistently was MAP been below 65 and decision made to start Levophed for increased perfusion and patient taken emergently to CT scan.  Reviewed CT scan findings without any evidence of obstructive stone making septic stone as cause of patient's symptoms less likely.  CT did show evidence of colitis.  Blood work returned showing significant lactic acidosis.  Patient does have some mild tenderness to palpation and with significantly elevated lactates cannot exclude bowel ischemia as cause of patient's presentation.  Surgery consulted as well as the ICU.  Repeat POCUS with improved ejection fraction when patient was on Levophed.  Patient continued to receive IV fluids while monitoring her IVC and the presence of B-lines.  Discussed with surgery who do not feel that bowel ischemia is a likely etiology of patient's sepsis and feels it is more likely secondary to urosepsis.  Discussed with radiology and no clear evidence of any stone seen although patient does have hydronephrosis.  Discussed with MICU with concern for uptrending lactates and concern for septic shock.  Attempted to place an arterial line however catheter was not threading easily and therefore catheter removed and pressure held to the femoral artery.  Patient critically ill discussed with MICU will admit to the ICU for serial abdominal exams, IV fluids, pressors.  Consider urology evaluation however without any obvious stone septic stone is less likely. pt is not on metformin making ISIDORO less likely. altman placed to mintor urine output

## 2024-05-05 NOTE — CHART NOTE - NSCHARTNOTEFT_GEN_A_CORE
: Letha Coyne    INDICATION:   [x] Shock  [ ] Acute hypoxic respiratory failure  [ ] Other: Abdominal pain    PROCEDURE:  [x] LIMITED ECHO  [x] LIMITED CHEST  [ ] LIMITED RETROPERITONEAL  [ ] LIMITED ABDOMINAL  [ ] LIMITED DVT  [ ] NEEDLE GUIDANCE VASCULAR  [ ] NEEDLE GUIDANCE THORACENTESIS  [ ] NEEDLE GUIDANCE PARACENTESIS  [ ] NEEDLE GUIDANCE PERICARDIOCENTESIS  [ ] OTHER    FINDINGS: Predominant A-line pattern, rare b lines, lung sliding bilaterally, no effusions  LV function preserved, VTI 19, no wall motion abnormalities  IVC <1.5, collapsing >50% Respiratory variation        INTERPRETATION: Grossly normal cardiopulmonary exam. Evidence of hypovolemia and volume depletion, will give additional IVF

## 2024-05-05 NOTE — CONSULT NOTE ADULT - ATTENDING COMMENTS
Assessment/recommendations as noted. Requested by surgery and MICU team to perform urgent flexible sigmoidoscopy to evaluate for colonic mucosa as to possible colitis in view of consideration for urgent surgical intervention.

## 2024-05-05 NOTE — CONSULT NOTE ADULT - SUBJECTIVE AND OBJECTIVE BOX
General Surgery Consult      HPI: Miranda Godoy is a 73 y.o. woman with history of HTN, HLD, DM, CVA w/ residual left-sided motor deficits, diverticulitis s/p colectomy, cholecystectomy, and caesarian section who presented to the ED on 5/5 with AMS and significant hypotension.     Patient was found down at home the morning of 5/5 by her aide. Last seen by aide at 1600 on 5/4.     At the time of interview, the patient was alert, oriented, and normotensive, but still on pressor support. Family at bedside confirming that the patient is at her neurological baseline.     The patient states that she has had intermittent abdominal pain and diarrhea since starting on Ozempic. Denies any abdominal pain at the time of interview. Denies any hematochezia or melena.       PAST MEDICAL HISTORY:  HTN  HLD  DM  CVA  Diverticulitis       PAST SURGICAL HISTORY:  Colectomy  Cholecystectomy  Caesarian section      ALLERGIES:  No Known Allergies      VITALS & I/Os:  Vital Signs Last 24 Hrs  T(C): 36.6 (05 May 2024 09:59), Max: 36.6 (05 May 2024 09:59)  T(F): 97.8 (05 May 2024 09:59), Max: 97.8 (05 May 2024 09:59)  HR: 105 (05 May 2024 13:04) (100 - 113)  BP: 105/52 (05 May 2024 13:04) (61/42 - 164/91)  BP(mean): 74 (05 May 2024 11:40) (74 - 81)  RR: 20 (05 May 2024 13:04) (17 - 20)  SpO2: 96% (05 May 2024 13:04) (96% - 99%)    Parameters below as of 05 May 2024 13:04  Patient On (Oxygen Delivery Method): room air      PHYSICAL EXAM:  General: No acute distress, awake, alert, answering questions appropriately   Respiratory: Nonlabored  Cardiovascular: normotensive, regular rate, on pressor support   Abdominal: Soft, nondistended, minimal LLQ + RUQ + L flank tenderness. No rebound or guarding. No organomegaly, no palpable mass.  Extremities: left arm cool with swelling note (IV infiltration that was running Levo)      LABS:                        10.3   50.44 )-----------( 77       ( 05 May 2024 11:52 )             32.1     05-05    133<L>  |  97  |  36<H>  ----------------------------<  228<H>  4.9   |  12<L>  |  2.73<H>    Ca    7.8<L>      05 May 2024 11:52  Phos  3.3     05-05  Mg     1.0     05-05    TPro  5.7<L>  /  Alb  2.9<L>  /  TBili  3.8<H>  /  DBili  x   /  AST  66<H>  /  ALT  37  /  AlkPhos  270<H>  05-05    Lactate:  05-05 @ 11:00  7.0  05-05 @ 09:59  7.7    PT/INR - ( 05 May 2024 10:00 )   PT: 16.6 sec;   INR: 1.53 ratio      PTT - ( 05 May 2024 10:00 )  PTT:33.4 sec    CARDIAC MARKERS ( 05 May 2024 11:52 )  x     / x     / 234 U/L / x     / 7.4 ng/mL    Urinalysis Basic - ( 05 May 2024 11:52 )    Color: x / Appearance: x / SG: x / pH: x  Gluc: 228 mg/dL / Ketone: x  / Bili: x / Urobili: x   Blood: x / Protein: x / Nitrite: x   Leuk Esterase: x / RBC: x / WBC x   Sq Epi: x / Non Sq Epi: x / Bacteria: x      IMAGING:  CT AP:  LOWER CHEST: Scattered bilateral subsegmental atelectasis. Coronary   artery and aortic calcifications.. Mitral annular calcifications.    LIVER: Steatosis. Hepatomegaly. There is a 2.1 x 1.8 cm lesion with   peripheral hyperenhancement within the dome of the RIGHT lobe of the   liver (301-15). Ill-defined low density region anterior and superior to   this (301-13) is also indeterminate.  BILE DUCTS: Normal caliber.  GALLBLADDER: Cholecystectomy.  SPLEEN: Within normal limits.  PANCREAS: Within normal limits.  ADRENALS: Within normal limits.  KIDNEYS/URETERS: Mild right hydroureteronephrosis. No nephrolithiasis. No   left hydronephrosis.    BLADDER: Diffuse bladder wall thickening versus underdistention.  REPRODUCTIVE ORGANS: Uterus and adnexa within normal limits.    BOWEL: No bowel obstruction. Appendix is normal. There is diffuse   thickening of nearly the entirety of the colon with relative sparing of   the cecum and the rectum distal to the anastomosis. Findings are   consistent with a diffuse colitis.    PERITONEUM: No ascites.  VESSELS: Atherosclerotic changes.  RETROPERITONEUM/LYMPH NODES: There are prominent rounded slightly   hyperenhancing abnormal-appearing para-aortic lymph nodes. For example,   there is a rounded hyperenhancing node measuring 13 x 12 mm (301-57)   anterior to the aorta.  ABDOMINAL WALL: Small fat-containing umbilical hernia. Fat containing   RIGHT spigelian hernia.  BONES: Within normal limits.    IMPRESSION:  1.  Mild right hydroureteronephrosis with diffuse bladder wall thickening   versus underdistention. Correlate with urinalysis for possible ascending   urinary tract infection.  2.  Multiple ill-defined low density lesions within the RIGHT lobe of the   liver. Differential diagnosis includes metastases, infection/developing   abscess, or atypical masslike focal steatosis. MRI of the liver with   intravenous contrast is recommended for further evaluation.  3.  Diffuse nonspecific colitis.

## 2024-05-05 NOTE — H&P ADULT - NSHPLABSRESULTS_GEN_ALL_CORE
.  LABS:                         10.3   50.44 )-----------( 77       ( 05 May 2024 11:52 )             32.1     05    133<L>  |  97  |  36<H>  ----------------------------<  228<H>  4.9   |  12<L>  |  2.73<H>    Ca    7.8<L>      05 May 2024 11:52  Phos  3.3     05-  Mg     1.0         TPro  5.7<L>  /  Alb  2.9<L>  /  TBili  3.8<H>  /  DBili  x   /  AST  66<H>  /  ALT  37  /  AlkPhos  270<H>      PT/INR - ( 05 May 2024 10:00 )   PT: 16.6 sec;   INR: 1.53 ratio         PTT - ( 05 May 2024 10:00 )  PTT:33.4 sec  Urinalysis Basic - ( 05 May 2024 13:09 )    Color: Dark Yellow / Appearance: Turbid / S.017 / pH: x  Gluc: x / Ketone: Negative mg/dL  / Bili: Small / Urobili: 1.0 mg/dL   Blood: x / Protein: 100 mg/dL / Nitrite: Negative   Leuk Esterase: Large / RBC: x / WBC x   Sq Epi: x / Non Sq Epi: x / Bacteria: x            RADIOLOGY, EKG & ADDITIONAL TESTS: Reviewed.

## 2024-05-05 NOTE — ED ADULT NURSE NOTE - BOWEL SOUNDS LUQ
present Sski Pregnancy And Lactation Text: This medication is Pregnancy Category D and isn't considered safe during pregnancy. It is excreted in breast milk.

## 2024-05-06 LAB
-  STAPHYLOCOCCUS EPIDERMIDIS, METHICILLIN RESISTANT: SIGNIFICANT CHANGE UP
A1C WITH ESTIMATED AVERAGE GLUCOSE RESULT: 7.6 % — HIGH (ref 4–5.6)
ALBUMIN SERPL ELPH-MCNC: 1.2 G/DL — LOW (ref 3.3–5)
ALBUMIN SERPL ELPH-MCNC: 2.2 G/DL — LOW (ref 3.3–5)
ALBUMIN SERPL ELPH-MCNC: 2.2 G/DL — LOW (ref 3.3–5)
ALBUMIN SERPL ELPH-MCNC: 2.3 G/DL — LOW (ref 3.3–5)
ALP SERPL-CCNC: 355 U/L — HIGH (ref 40–120)
ALP SERPL-CCNC: 371 U/L — HIGH (ref 40–120)
ALP SERPL-CCNC: 378 U/L — HIGH (ref 40–120)
ALP SERPL-CCNC: 465 U/L — HIGH (ref 40–120)
ALT FLD-CCNC: 253 U/L — HIGH (ref 10–45)
ALT FLD-CCNC: 49 U/L — HIGH (ref 10–45)
ALT FLD-CCNC: 56 U/L — HIGH (ref 10–45)
ALT FLD-CCNC: 83 U/L — HIGH (ref 10–45)
ANION GAP SERPL CALC-SCNC: 21 MMOL/L — HIGH (ref 5–17)
ANION GAP SERPL CALC-SCNC: 24 MMOL/L — HIGH (ref 5–17)
ANION GAP SERPL CALC-SCNC: 27 MMOL/L — HIGH (ref 5–17)
ANION GAP SERPL CALC-SCNC: 30 MMOL/L — HIGH (ref 5–17)
ANION GAP SERPL CALC-SCNC: 40 MMOL/L — HIGH (ref 5–17)
APTT BLD: 38.7 SEC — HIGH (ref 24.5–35.6)
APTT BLD: 52.7 SEC — HIGH (ref 24.5–35.6)
AST SERPL-CCNC: 139 U/L — HIGH (ref 10–40)
AST SERPL-CCNC: 262 U/L — HIGH (ref 10–40)
AST SERPL-CCNC: 690 U/L — HIGH (ref 10–40)
AST SERPL-CCNC: 78 U/L — HIGH (ref 10–40)
BASE EXCESS BLDV CALC-SCNC: -12.9 MMOL/L — LOW (ref -2–3)
BASOPHILS # BLD AUTO: 0.03 K/UL — SIGNIFICANT CHANGE UP (ref 0–0.2)
BASOPHILS # BLD AUTO: 0.05 K/UL — SIGNIFICANT CHANGE UP (ref 0–0.2)
BASOPHILS NFR BLD AUTO: 0.1 % — SIGNIFICANT CHANGE UP (ref 0–2)
BASOPHILS NFR BLD AUTO: 0.3 % — SIGNIFICANT CHANGE UP (ref 0–2)
BILIRUB SERPL-MCNC: 2.6 MG/DL — HIGH (ref 0.2–1.2)
BILIRUB SERPL-MCNC: 4.7 MG/DL — HIGH (ref 0.2–1.2)
BILIRUB SERPL-MCNC: 4.8 MG/DL — HIGH (ref 0.2–1.2)
BILIRUB SERPL-MCNC: 5.1 MG/DL — HIGH (ref 0.2–1.2)
BUN SERPL-MCNC: 12 MG/DL — SIGNIFICANT CHANGE UP (ref 7–23)
BUN SERPL-MCNC: 15 MG/DL — SIGNIFICANT CHANGE UP (ref 7–23)
BUN SERPL-MCNC: 15 MG/DL — SIGNIFICANT CHANGE UP (ref 7–23)
BUN SERPL-MCNC: 22 MG/DL — SIGNIFICANT CHANGE UP (ref 7–23)
BUN SERPL-MCNC: 34 MG/DL — HIGH (ref 7–23)
CA-I SERPL-SCNC: 1.12 MMOL/L — LOW (ref 1.15–1.33)
CALCIUM SERPL-MCNC: 11.8 MG/DL — HIGH (ref 8.4–10.5)
CALCIUM SERPL-MCNC: 7.3 MG/DL — LOW (ref 8.4–10.5)
CALCIUM SERPL-MCNC: 7.7 MG/DL — LOW (ref 8.4–10.5)
CALCIUM SERPL-MCNC: 7.7 MG/DL — LOW (ref 8.4–10.5)
CALCIUM SERPL-MCNC: 7.8 MG/DL — LOW (ref 8.4–10.5)
CHLORIDE BLDV-SCNC: 97 MMOL/L — SIGNIFICANT CHANGE UP (ref 96–108)
CHLORIDE SERPL-SCNC: 100 MMOL/L — SIGNIFICANT CHANGE UP (ref 96–108)
CHLORIDE SERPL-SCNC: 100 MMOL/L — SIGNIFICANT CHANGE UP (ref 96–108)
CHLORIDE SERPL-SCNC: 92 MMOL/L — LOW (ref 96–108)
CHLORIDE SERPL-SCNC: 92 MMOL/L — LOW (ref 96–108)
CHLORIDE SERPL-SCNC: 93 MMOL/L — LOW (ref 96–108)
CK SERPL-CCNC: 222 U/L — HIGH (ref 25–170)
CK SERPL-CCNC: 293 U/L — HIGH (ref 25–170)
CK SERPL-CCNC: CRITICAL HIGH U/L (ref 25–170)
CLOSURE TME COLL+EPINEP BLD: 22 K/UL — LOW (ref 150–400)
CLOSURE TME COLL+EPINEP BLD: 33 K/UL — LOW (ref 150–400)
CO2 BLDV-SCNC: 17 MMOL/L — LOW (ref 22–26)
CO2 SERPL-SCNC: 10 MMOL/L — CRITICAL LOW (ref 22–31)
CO2 SERPL-SCNC: 11 MMOL/L — LOW (ref 22–31)
CO2 SERPL-SCNC: 12 MMOL/L — LOW (ref 22–31)
CO2 SERPL-SCNC: 13 MMOL/L — LOW (ref 22–31)
CO2 SERPL-SCNC: <10 MMOL/L — CRITICAL LOW (ref 22–31)
CREAT SERPL-MCNC: 0.77 MG/DL — SIGNIFICANT CHANGE UP (ref 0.5–1.3)
CREAT SERPL-MCNC: 0.93 MG/DL — SIGNIFICANT CHANGE UP (ref 0.5–1.3)
CREAT SERPL-MCNC: 1.06 MG/DL — SIGNIFICANT CHANGE UP (ref 0.5–1.3)
CREAT SERPL-MCNC: 1.32 MG/DL — HIGH (ref 0.5–1.3)
CREAT SERPL-MCNC: 2.3 MG/DL — HIGH (ref 0.5–1.3)
D DIMER BLD IA.RAPID-MCNC: 9956 NG/ML DDU — HIGH
D DIMER BLD IA.RAPID-MCNC: HIGH NG/ML DDU
E COLI DNA BLD POS QL NAA+NON-PROBE: SIGNIFICANT CHANGE UP
EGFR: 22 ML/MIN/1.73M2 — LOW
EGFR: 42 ML/MIN/1.73M2 — LOW
EGFR: 55 ML/MIN/1.73M2 — LOW
EGFR: 64 ML/MIN/1.73M2 — SIGNIFICANT CHANGE UP
EGFR: 80 ML/MIN/1.73M2 — SIGNIFICANT CHANGE UP
EOSINOPHIL # BLD AUTO: 0.19 K/UL — SIGNIFICANT CHANGE UP (ref 0–0.5)
EOSINOPHIL # BLD AUTO: 0.91 K/UL — HIGH (ref 0–0.5)
EOSINOPHIL NFR BLD AUTO: 1.1 % — SIGNIFICANT CHANGE UP (ref 0–6)
EOSINOPHIL NFR BLD AUTO: 2.9 % — SIGNIFICANT CHANGE UP (ref 0–6)
ESTIMATED AVERAGE GLUCOSE: 171 MG/DL — HIGH (ref 68–114)
FIBRINOGEN PPP-MCNC: 390 MG/DL — SIGNIFICANT CHANGE UP (ref 200–445)
FIBRINOGEN PPP-MCNC: 537 MG/DL — HIGH (ref 200–445)
GAS PNL BLDA: SIGNIFICANT CHANGE UP
GAS PNL BLDV: 128 MMOL/L — LOW (ref 136–145)
GAS PNL BLDV: SIGNIFICANT CHANGE UP
GAS PNL BLDV: SIGNIFICANT CHANGE UP
GLUCOSE BLDC GLUCOMTR-MCNC: 145 MG/DL — HIGH (ref 70–99)
GLUCOSE BLDC GLUCOMTR-MCNC: 174 MG/DL — HIGH (ref 70–99)
GLUCOSE BLDC GLUCOMTR-MCNC: 184 MG/DL — HIGH (ref 70–99)
GLUCOSE BLDC GLUCOMTR-MCNC: 248 MG/DL — HIGH (ref 70–99)
GLUCOSE BLDV-MCNC: 200 MG/DL — HIGH (ref 70–99)
GLUCOSE SERPL-MCNC: 175 MG/DL — HIGH (ref 70–99)
GLUCOSE SERPL-MCNC: 197 MG/DL — HIGH (ref 70–99)
GLUCOSE SERPL-MCNC: 212 MG/DL — HIGH (ref 70–99)
GLUCOSE SERPL-MCNC: 305 MG/DL — HIGH (ref 70–99)
GLUCOSE SERPL-MCNC: 67 MG/DL — LOW (ref 70–99)
GRAM STN FLD: ABNORMAL
GRAM STN FLD: ABNORMAL
HAPTOGLOB SERPL-MCNC: 170 MG/DL — SIGNIFICANT CHANGE UP (ref 34–200)
HAV IGM SER-ACNC: SIGNIFICANT CHANGE UP
HAV IGM SER-ACNC: SIGNIFICANT CHANGE UP
HBV CORE IGM SER-ACNC: SIGNIFICANT CHANGE UP
HBV CORE IGM SER-ACNC: SIGNIFICANT CHANGE UP
HBV SURFACE AG SER-ACNC: SIGNIFICANT CHANGE UP
HBV SURFACE AG SER-ACNC: SIGNIFICANT CHANGE UP
HCO3 BLDV-SCNC: 16 MMOL/L — LOW (ref 22–29)
HCT VFR BLD CALC: 17.4 % — CRITICAL LOW (ref 34.5–45)
HCT VFR BLD CALC: 29.1 % — LOW (ref 34.5–45)
HCT VFR BLD CALC: 29.7 % — LOW (ref 34.5–45)
HCT VFR BLDA CALC: 34 % — LOW (ref 34.5–46.5)
HCV AB S/CO SERPL IA: 0.13 S/CO — SIGNIFICANT CHANGE UP (ref 0–0.99)
HCV AB S/CO SERPL IA: 0.13 S/CO — SIGNIFICANT CHANGE UP (ref 0–0.99)
HCV AB SERPL-IMP: SIGNIFICANT CHANGE UP
HCV AB SERPL-IMP: SIGNIFICANT CHANGE UP
HEPARINASE TEG R TIME: 10.2 MIN — HIGH (ref 4.3–8.3)
HGB BLD CALC-MCNC: 11.4 G/DL — LOW (ref 11.7–16.1)
HGB BLD-MCNC: 10.4 G/DL — LOW (ref 11.5–15.5)
HGB BLD-MCNC: 5.7 G/DL — CRITICAL LOW (ref 11.5–15.5)
HGB BLD-MCNC: 9.9 G/DL — LOW (ref 11.5–15.5)
HOROWITZ INDEX BLDV+IHG-RTO: 40 — SIGNIFICANT CHANGE UP
IMM GRANULOCYTES NFR BLD AUTO: 0.4 % — SIGNIFICANT CHANGE UP (ref 0–0.9)
IMM GRANULOCYTES NFR BLD AUTO: 3.3 % — HIGH (ref 0–0.9)
INR BLD: 1.89 RATIO — HIGH (ref 0.85–1.18)
INR BLD: 2.15 RATIO — HIGH (ref 0.85–1.18)
INR BLD: 3.19 RATIO — HIGH (ref 0.85–1.18)
LACTATE BLDV-MCNC: 8.3 MMOL/L — CRITICAL HIGH (ref 0.5–2)
LDH SERPL L TO P-CCNC: 1561 U/L — HIGH (ref 50–242)
LDH SERPL L TO P-CCNC: 402 U/L — HIGH (ref 50–242)
LDH SERPL L TO P-CCNC: 771 U/L — HIGH (ref 50–242)
LYMPHOCYTES # BLD AUTO: 1.19 K/UL — SIGNIFICANT CHANGE UP (ref 1–3.3)
LYMPHOCYTES # BLD AUTO: 15.2 % — SIGNIFICANT CHANGE UP (ref 13–44)
LYMPHOCYTES # BLD AUTO: 2.63 K/UL — SIGNIFICANT CHANGE UP (ref 1–3.3)
LYMPHOCYTES # BLD AUTO: 3.8 % — LOW (ref 13–44)
MAGNESIUM SERPL-MCNC: 1.3 MG/DL — LOW (ref 1.6–2.6)
MAGNESIUM SERPL-MCNC: 2.2 MG/DL — SIGNIFICANT CHANGE UP (ref 1.6–2.6)
MAGNESIUM SERPL-MCNC: 2.3 MG/DL — SIGNIFICANT CHANGE UP (ref 1.6–2.6)
MAGNESIUM SERPL-MCNC: 2.3 MG/DL — SIGNIFICANT CHANGE UP (ref 1.6–2.6)
MAGNESIUM SERPL-MCNC: 2.9 MG/DL — HIGH (ref 1.6–2.6)
MCHC RBC-ENTMCNC: 28.8 PG — SIGNIFICANT CHANGE UP (ref 27–34)
MCHC RBC-ENTMCNC: 29.4 PG — SIGNIFICANT CHANGE UP (ref 27–34)
MCHC RBC-ENTMCNC: 29.5 PG — SIGNIFICANT CHANGE UP (ref 27–34)
MCHC RBC-ENTMCNC: 32.8 GM/DL — SIGNIFICANT CHANGE UP (ref 32–36)
MCHC RBC-ENTMCNC: 34 GM/DL — SIGNIFICANT CHANGE UP (ref 32–36)
MCHC RBC-ENTMCNC: 35 GM/DL — SIGNIFICANT CHANGE UP (ref 32–36)
MCV RBC AUTO: 84.4 FL — SIGNIFICANT CHANGE UP (ref 80–100)
MCV RBC AUTO: 84.6 FL — SIGNIFICANT CHANGE UP (ref 80–100)
MCV RBC AUTO: 89.7 FL — SIGNIFICANT CHANGE UP (ref 80–100)
METHOD TYPE: SIGNIFICANT CHANGE UP
MONOCYTES # BLD AUTO: 0.27 K/UL — SIGNIFICANT CHANGE UP (ref 0–0.9)
MONOCYTES # BLD AUTO: 0.51 K/UL — SIGNIFICANT CHANGE UP (ref 0–0.9)
MONOCYTES NFR BLD AUTO: 1.6 % — LOW (ref 2–14)
MONOCYTES NFR BLD AUTO: 1.6 % — LOW (ref 2–14)
MRSA PCR RESULT.: SIGNIFICANT CHANGE UP
NEUTROPHILS # BLD AUTO: 13.57 K/UL — HIGH (ref 1.8–7.4)
NEUTROPHILS # BLD AUTO: 28.45 K/UL — HIGH (ref 1.8–7.4)
NEUTROPHILS NFR BLD AUTO: 78.5 % — HIGH (ref 43–77)
NEUTROPHILS NFR BLD AUTO: 91.2 % — HIGH (ref 43–77)
NRBC # BLD: 0 /100 WBCS — SIGNIFICANT CHANGE UP (ref 0–0)
PCO2 BLDV: 46 MMHG — HIGH (ref 39–42)
PH BLDV: 7.14 — CRITICAL LOW (ref 7.32–7.43)
PHOSPHATE SERPL-MCNC: 13.4 MG/DL — HIGH (ref 2.5–4.5)
PHOSPHATE SERPL-MCNC: 3.4 MG/DL — SIGNIFICANT CHANGE UP (ref 2.5–4.5)
PHOSPHATE SERPL-MCNC: 3.8 MG/DL — SIGNIFICANT CHANGE UP (ref 2.5–4.5)
PHOSPHATE SERPL-MCNC: 4.6 MG/DL — HIGH (ref 2.5–4.5)
PHOSPHATE SERPL-MCNC: 5.5 MG/DL — HIGH (ref 2.5–4.5)
PLATELET # BLD AUTO: 17 K/UL — CRITICAL LOW (ref 150–400)
PLATELET # BLD AUTO: 23 K/UL — LOW (ref 150–400)
PLATELET # BLD AUTO: 37 K/UL — LOW (ref 150–400)
PO2 BLDV: 26 MMHG — SIGNIFICANT CHANGE UP (ref 25–45)
POTASSIUM BLDV-SCNC: 4.1 MMOL/L — SIGNIFICANT CHANGE UP (ref 3.5–5.1)
POTASSIUM SERPL-MCNC: 4.1 MMOL/L — SIGNIFICANT CHANGE UP (ref 3.5–5.3)
POTASSIUM SERPL-MCNC: 4.8 MMOL/L — SIGNIFICANT CHANGE UP (ref 3.5–5.3)
POTASSIUM SERPL-MCNC: 5.2 MMOL/L — SIGNIFICANT CHANGE UP (ref 3.5–5.3)
POTASSIUM SERPL-MCNC: 5.9 MMOL/L — HIGH (ref 3.5–5.3)
POTASSIUM SERPL-MCNC: 8.4 MMOL/L — CRITICAL HIGH (ref 3.5–5.3)
POTASSIUM SERPL-SCNC: 4.1 MMOL/L — SIGNIFICANT CHANGE UP (ref 3.5–5.3)
POTASSIUM SERPL-SCNC: 4.8 MMOL/L — SIGNIFICANT CHANGE UP (ref 3.5–5.3)
POTASSIUM SERPL-SCNC: 5.2 MMOL/L — SIGNIFICANT CHANGE UP (ref 3.5–5.3)
POTASSIUM SERPL-SCNC: 5.9 MMOL/L — HIGH (ref 3.5–5.3)
POTASSIUM SERPL-SCNC: 8.4 MMOL/L — CRITICAL HIGH (ref 3.5–5.3)
PROT SERPL-MCNC: 2.6 G/DL — LOW (ref 6–8.3)
PROT SERPL-MCNC: 4.6 G/DL — LOW (ref 6–8.3)
PROT SERPL-MCNC: 5 G/DL — LOW (ref 6–8.3)
PROT SERPL-MCNC: 5.1 G/DL — LOW (ref 6–8.3)
PROTHROM AB SERPL-ACNC: 20.4 SEC — HIGH (ref 9.5–13)
PROTHROM AB SERPL-ACNC: 22.1 SEC — HIGH (ref 9.5–13)
PROTHROM AB SERPL-ACNC: 32.4 SEC — HIGH (ref 9.5–13)
RAPIDTEG MAXIMUM AMPLITUDE: 51.6 MM — LOW (ref 52–70)
RAPIDTEG MAXIMUM AMPLITUDE: 57.5 MM — SIGNIFICANT CHANGE UP (ref 52–70)
RBC # BLD: 1.94 M/UL — LOW (ref 3.8–5.2)
RBC # BLD: 3.44 M/UL — LOW (ref 3.8–5.2)
RBC # BLD: 3.52 M/UL — LOW (ref 3.8–5.2)
RBC # FLD: 13.2 % — SIGNIFICANT CHANGE UP (ref 10.3–14.5)
RBC # FLD: 13.6 % — SIGNIFICANT CHANGE UP (ref 10.3–14.5)
RBC # FLD: 13.8 % — SIGNIFICANT CHANGE UP (ref 10.3–14.5)
S AUREUS DNA NOSE QL NAA+PROBE: SIGNIFICANT CHANGE UP
SAO2 % BLDV: 33.5 % — LOW (ref 67–88)
SODIUM SERPL-SCNC: 131 MMOL/L — LOW (ref 135–145)
SODIUM SERPL-SCNC: 131 MMOL/L — LOW (ref 135–145)
SODIUM SERPL-SCNC: 134 MMOL/L — LOW (ref 135–145)
SODIUM SERPL-SCNC: 134 MMOL/L — LOW (ref 135–145)
SODIUM SERPL-SCNC: 144 MMOL/L — SIGNIFICANT CHANGE UP (ref 135–145)
SPECIMEN SOURCE: SIGNIFICANT CHANGE UP
TEG FUNCTIONAL FIBRINOGEN: 23.3 MM — SIGNIFICANT CHANGE UP (ref 15–32)
TEG FUNCTIONAL FIBRINOGEN: 26.6 MM — SIGNIFICANT CHANGE UP (ref 15–32)
TEG LY30 (LYSIS): 0 % — SIGNIFICANT CHANGE UP (ref 0–2.6)
TEG MAXIMUM AMPLITUDE: 57.9 MM — SIGNIFICANT CHANGE UP (ref 52–69)
TEG REACTION TIME: 13.7 MIN — HIGH (ref 4.6–9.1)
TEG REACTION TIME: 15.3 MIN — HIGH (ref 4.6–9.1)
TROPONIN T, HIGH SENSITIVITY RESULT: 164 NG/L — HIGH (ref 0–51)
TROPONIN T, HIGH SENSITIVITY RESULT: 212 NG/L — HIGH (ref 0–51)
UUN UR-MCNC: 158 MG/DL — SIGNIFICANT CHANGE UP
WBC # BLD: 17.28 K/UL — HIGH (ref 3.8–10.5)
WBC # BLD: 31.23 K/UL — HIGH (ref 3.8–10.5)
WBC # BLD: 47.1 K/UL — CRITICAL HIGH (ref 3.8–10.5)
WBC # FLD AUTO: 17.28 K/UL — HIGH (ref 3.8–10.5)
WBC # FLD AUTO: 31.23 K/UL — HIGH (ref 3.8–10.5)
WBC # FLD AUTO: 47.1 K/UL — CRITICAL HIGH (ref 3.8–10.5)

## 2024-05-06 PROCEDURE — 84132 ASSAY OF SERUM POTASSIUM: CPT

## 2024-05-06 PROCEDURE — 86850 RBC ANTIBODY SCREEN: CPT

## 2024-05-06 PROCEDURE — C1751: CPT

## 2024-05-06 PROCEDURE — 82553 CREATINE MB FRACTION: CPT

## 2024-05-06 PROCEDURE — 82435 ASSAY OF BLOOD CHLORIDE: CPT

## 2024-05-06 PROCEDURE — 86901 BLOOD TYPING SEROLOGIC RH(D): CPT

## 2024-05-06 PROCEDURE — 84295 ASSAY OF SERUM SODIUM: CPT

## 2024-05-06 PROCEDURE — 84300 ASSAY OF URINE SODIUM: CPT

## 2024-05-06 PROCEDURE — 51702 INSERT TEMP BLADDER CATH: CPT

## 2024-05-06 PROCEDURE — 87186 SC STD MICRODIL/AGAR DIL: CPT

## 2024-05-06 PROCEDURE — 81001 URINALYSIS AUTO W/SCOPE: CPT

## 2024-05-06 PROCEDURE — 36620 INSERTION CATHETER ARTERY: CPT

## 2024-05-06 PROCEDURE — 83735 ASSAY OF MAGNESIUM: CPT

## 2024-05-06 PROCEDURE — 85018 HEMOGLOBIN: CPT

## 2024-05-06 PROCEDURE — 76000 FLUOROSCOPY <1 HR PHYS/QHP: CPT

## 2024-05-06 PROCEDURE — 36415 COLL VENOUS BLD VENIPUNCTURE: CPT

## 2024-05-06 PROCEDURE — 82550 ASSAY OF CK (CPK): CPT

## 2024-05-06 PROCEDURE — 71045 X-RAY EXAM CHEST 1 VIEW: CPT

## 2024-05-06 PROCEDURE — 76700 US EXAM ABDOM COMPLETE: CPT

## 2024-05-06 PROCEDURE — 76604 US EXAM CHEST: CPT | Mod: 26,GC

## 2024-05-06 PROCEDURE — 82330 ASSAY OF CALCIUM: CPT

## 2024-05-06 PROCEDURE — 80053 COMPREHEN METABOLIC PANEL: CPT

## 2024-05-06 PROCEDURE — 85379 FIBRIN DEGRADATION QUANT: CPT

## 2024-05-06 PROCEDURE — 86900 BLOOD TYPING SEROLOGIC ABO: CPT

## 2024-05-06 PROCEDURE — 84484 ASSAY OF TROPONIN QUANT: CPT

## 2024-05-06 PROCEDURE — 86753 PROTOZOA ANTIBODY NOS: CPT

## 2024-05-06 PROCEDURE — 76377 3D RENDER W/INTRP POSTPROCES: CPT

## 2024-05-06 PROCEDURE — 87637 SARSCOV2&INF A&B&RSV AMP PRB: CPT

## 2024-05-06 PROCEDURE — 83880 ASSAY OF NATRIURETIC PEPTIDE: CPT

## 2024-05-06 PROCEDURE — 82565 ASSAY OF CREATININE: CPT

## 2024-05-06 PROCEDURE — 93308 TTE F-UP OR LMTD: CPT | Mod: 26,GC

## 2024-05-06 PROCEDURE — 84100 ASSAY OF PHOSPHORUS: CPT

## 2024-05-06 PROCEDURE — 93971 EXTREMITY STUDY: CPT

## 2024-05-06 PROCEDURE — 83036 HEMOGLOBIN GLYCOSYLATED A1C: CPT

## 2024-05-06 PROCEDURE — 85610 PROTHROMBIN TIME: CPT

## 2024-05-06 PROCEDURE — 84550 ASSAY OF BLOOD/URIC ACID: CPT

## 2024-05-06 PROCEDURE — 99291 CRITICAL CARE FIRST HOUR: CPT | Mod: GC

## 2024-05-06 PROCEDURE — 99223 1ST HOSP IP/OBS HIGH 75: CPT | Mod: GC,25

## 2024-05-06 PROCEDURE — 93306 TTE W/DOPPLER COMPLETE: CPT

## 2024-05-06 PROCEDURE — 86682 HELMINTH ANTIBODY: CPT

## 2024-05-06 PROCEDURE — 96375 TX/PRO/DX INJ NEW DRUG ADDON: CPT

## 2024-05-06 PROCEDURE — 85049 AUTOMATED PLATELET COUNT: CPT

## 2024-05-06 PROCEDURE — 85384 FIBRINOGEN ACTIVITY: CPT

## 2024-05-06 PROCEDURE — 80074 ACUTE HEPATITIS PANEL: CPT

## 2024-05-06 PROCEDURE — 36556 INSERT NON-TUNNEL CV CATH: CPT

## 2024-05-06 PROCEDURE — 82570 ASSAY OF URINE CREATININE: CPT

## 2024-05-06 PROCEDURE — 83010 ASSAY OF HAPTOGLOBIN QUANT: CPT

## 2024-05-06 PROCEDURE — 85730 THROMBOPLASTIN TIME PARTIAL: CPT

## 2024-05-06 PROCEDURE — 96374 THER/PROPH/DIAG INJ IV PUSH: CPT | Mod: XU

## 2024-05-06 PROCEDURE — 82803 BLOOD GASES ANY COMBINATION: CPT

## 2024-05-06 PROCEDURE — 82947 ASSAY GLUCOSE BLOOD QUANT: CPT

## 2024-05-06 PROCEDURE — 85027 COMPLETE CBC AUTOMATED: CPT

## 2024-05-06 PROCEDURE — 94640 AIRWAY INHALATION TREATMENT: CPT

## 2024-05-06 PROCEDURE — 85025 COMPLETE CBC W/AUTO DIFF WBC: CPT

## 2024-05-06 PROCEDURE — 87077 CULTURE AEROBIC IDENTIFY: CPT

## 2024-05-06 PROCEDURE — 84540 ASSAY OF URINE/UREA-N: CPT

## 2024-05-06 PROCEDURE — 76700 US EXAM ABDOM COMPLETE: CPT | Mod: 26

## 2024-05-06 PROCEDURE — 87640 STAPH A DNA AMP PROBE: CPT

## 2024-05-06 PROCEDURE — 74177 CT ABD & PELVIS W/CONTRAST: CPT | Mod: MC

## 2024-05-06 PROCEDURE — 82010 KETONE BODYS QUAN: CPT

## 2024-05-06 PROCEDURE — 76937 US GUIDE VASCULAR ACCESS: CPT

## 2024-05-06 PROCEDURE — 83605 ASSAY OF LACTIC ACID: CPT

## 2024-05-06 PROCEDURE — 70450 CT HEAD/BRAIN W/O DYE: CPT | Mod: MC

## 2024-05-06 PROCEDURE — 99285 EMERGENCY DEPT VISIT HI MDM: CPT | Mod: 25

## 2024-05-06 PROCEDURE — 87086 URINE CULTURE/COLONY COUNT: CPT

## 2024-05-06 PROCEDURE — 93308 TTE F-UP OR LMTD: CPT | Mod: 26

## 2024-05-06 PROCEDURE — 83615 LACTATE (LD) (LDH) ENZYME: CPT

## 2024-05-06 PROCEDURE — 80048 BASIC METABOLIC PNL TOTAL CA: CPT

## 2024-05-06 PROCEDURE — 99223 1ST HOSP IP/OBS HIGH 75: CPT

## 2024-05-06 PROCEDURE — C1769: CPT

## 2024-05-06 PROCEDURE — 87040 BLOOD CULTURE FOR BACTERIA: CPT

## 2024-05-06 PROCEDURE — 94002 VENT MGMT INPAT INIT DAY: CPT

## 2024-05-06 PROCEDURE — 93356 MYOCRD STRAIN IMG SPCKL TRCK: CPT

## 2024-05-06 PROCEDURE — 84156 ASSAY OF PROTEIN URINE: CPT

## 2024-05-06 PROCEDURE — C1758: CPT

## 2024-05-06 PROCEDURE — 87641 MR-STAPH DNA AMP PROBE: CPT

## 2024-05-06 PROCEDURE — 84145 PROCALCITONIN (PCT): CPT

## 2024-05-06 PROCEDURE — 87150 DNA/RNA AMPLIFIED PROBE: CPT

## 2024-05-06 PROCEDURE — 84133 ASSAY OF URINE POTASSIUM: CPT

## 2024-05-06 PROCEDURE — 94003 VENT MGMT INPAT SUBQ DAY: CPT

## 2024-05-06 PROCEDURE — C2617: CPT

## 2024-05-06 PROCEDURE — 83690 ASSAY OF LIPASE: CPT

## 2024-05-06 PROCEDURE — 82962 GLUCOSE BLOOD TEST: CPT

## 2024-05-06 PROCEDURE — 93308 TTE F-UP OR LMTD: CPT

## 2024-05-06 PROCEDURE — 83935 ASSAY OF URINE OSMOLALITY: CPT

## 2024-05-06 PROCEDURE — 90945 DIALYSIS ONE EVALUATION: CPT | Mod: GC

## 2024-05-06 PROCEDURE — 85014 HEMATOCRIT: CPT

## 2024-05-06 PROCEDURE — 85396 CLOTTING ASSAY WHOLE BLOOD: CPT

## 2024-05-06 DEVICE — GUIDEWIRE SENSOR DUAL-FLEX NITINOL STRAIGHT .035" X 150CM: Type: IMPLANTABLE DEVICE | Site: RIGHT | Status: FUNCTIONAL

## 2024-05-06 DEVICE — URETERAL CATH OPEN END 5FR 70CM: Type: IMPLANTABLE DEVICE | Site: RIGHT | Status: FUNCTIONAL

## 2024-05-06 DEVICE — URETERAL STENT CONTOUR 6FR 24CM: Type: IMPLANTABLE DEVICE | Site: RIGHT | Status: FUNCTIONAL

## 2024-05-06 RX ORDER — PANTOPRAZOLE SODIUM 20 MG/1
40 TABLET, DELAYED RELEASE ORAL DAILY
Refills: 0 | Status: DISCONTINUED | OUTPATIENT
Start: 2024-05-06 | End: 2024-05-06

## 2024-05-06 RX ORDER — INSULIN LISPRO 100/ML
VIAL (ML) SUBCUTANEOUS EVERY 6 HOURS
Refills: 0 | Status: DISCONTINUED | OUTPATIENT
Start: 2024-05-06 | End: 2024-05-06

## 2024-05-06 RX ORDER — VANCOMYCIN HCL 1 G
1000 VIAL (EA) INTRAVENOUS EVERY 24 HOURS
Refills: 0 | Status: DISCONTINUED | OUTPATIENT
Start: 2024-05-06 | End: 2024-05-06

## 2024-05-06 RX ORDER — NOREPINEPHRINE BITARTRATE/D5W 8 MG/250ML
0.85 PLASTIC BAG, INJECTION (ML) INTRAVENOUS
Qty: 16 | Refills: 0 | Status: DISCONTINUED | OUTPATIENT
Start: 2024-05-06 | End: 2024-05-06

## 2024-05-06 RX ORDER — CHLORHEXIDINE GLUCONATE 213 G/1000ML
1 SOLUTION TOPICAL
Refills: 0 | Status: DISCONTINUED | OUTPATIENT
Start: 2024-05-06 | End: 2024-05-06

## 2024-05-06 RX ORDER — HYDROCORTISONE 20 MG
50 TABLET ORAL EVERY 6 HOURS
Refills: 0 | Status: DISCONTINUED | OUTPATIENT
Start: 2024-05-06 | End: 2024-05-06

## 2024-05-06 RX ORDER — ACETAMINOPHEN 500 MG
1000 TABLET ORAL ONCE
Refills: 0 | Status: COMPLETED | OUTPATIENT
Start: 2024-05-06 | End: 2024-05-06

## 2024-05-06 RX ORDER — EPINEPHRINE 0.3 MG/.3ML
0.5 INJECTION INTRAMUSCULAR; SUBCUTANEOUS
Qty: 8 | Refills: 0 | Status: DISCONTINUED | OUTPATIENT
Start: 2024-05-06 | End: 2024-05-06

## 2024-05-06 RX ORDER — SODIUM CHLORIDE 9 MG/ML
10 INJECTION INTRAMUSCULAR; INTRAVENOUS; SUBCUTANEOUS
Refills: 0 | Status: DISCONTINUED | OUTPATIENT
Start: 2024-05-06 | End: 2024-05-06

## 2024-05-06 RX ORDER — MAGNESIUM SULFATE 500 MG/ML
2 VIAL (ML) INJECTION ONCE
Refills: 0 | Status: COMPLETED | OUTPATIENT
Start: 2024-05-06 | End: 2024-05-06

## 2024-05-06 RX ORDER — CHLORHEXIDINE GLUCONATE 213 G/1000ML
15 SOLUTION TOPICAL EVERY 12 HOURS
Refills: 0 | Status: DISCONTINUED | OUTPATIENT
Start: 2024-05-06 | End: 2024-05-06

## 2024-05-06 RX ORDER — ALBUTEROL 90 UG/1
10 AEROSOL, METERED ORAL ONCE
Refills: 0 | Status: COMPLETED | OUTPATIENT
Start: 2024-05-06 | End: 2024-05-06

## 2024-05-06 RX ORDER — SODIUM BICARBONATE 1 MEQ/ML
50 SYRINGE (ML) INTRAVENOUS ONCE
Refills: 0 | Status: COMPLETED | OUTPATIENT
Start: 2024-05-06 | End: 2024-05-06

## 2024-05-06 RX ORDER — INSULIN LISPRO 100/ML
4 VIAL (ML) SUBCUTANEOUS ONCE
Refills: 0 | Status: COMPLETED | OUTPATIENT
Start: 2024-05-06 | End: 2024-05-06

## 2024-05-06 RX ORDER — SODIUM BICARBONATE 1 MEQ/ML
0.2 SYRINGE (ML) INTRAVENOUS
Qty: 150 | Refills: 0 | Status: DISCONTINUED | OUTPATIENT
Start: 2024-05-06 | End: 2024-05-06

## 2024-05-06 RX ORDER — HUMAN INSULIN 100 [IU]/ML
3 INJECTION, SUSPENSION SUBCUTANEOUS EVERY 6 HOURS
Refills: 0 | Status: DISCONTINUED | OUTPATIENT
Start: 2024-05-06 | End: 2024-05-06

## 2024-05-06 RX ORDER — VANCOMYCIN HCL 1 G
500 VIAL (EA) INTRAVENOUS EVERY 6 HOURS
Refills: 0 | Status: DISCONTINUED | OUTPATIENT
Start: 2024-05-06 | End: 2024-05-06

## 2024-05-06 RX ADMIN — Medication 100 MEQ/KG/HR: at 13:05

## 2024-05-06 RX ADMIN — Medication 53.6 MICROGRAM(S)/KG/MIN: at 15:26

## 2024-05-06 RX ADMIN — Medication 2: at 05:28

## 2024-05-06 RX ADMIN — Medication 50 MILLIGRAM(S): at 12:14

## 2024-05-06 RX ADMIN — HUMAN INSULIN 3 UNIT(S): 100 INJECTION, SUSPENSION SUBCUTANEOUS at 05:28

## 2024-05-06 RX ADMIN — PIPERACILLIN AND TAZOBACTAM 25 GRAM(S): 4; .5 INJECTION, POWDER, LYOPHILIZED, FOR SOLUTION INTRAVENOUS at 05:28

## 2024-05-06 RX ADMIN — Medication 50 MILLIGRAM(S): at 05:27

## 2024-05-06 RX ADMIN — Medication 100 MEQ/KG/HR: at 01:06

## 2024-05-06 RX ADMIN — PIPERACILLIN AND TAZOBACTAM 25 GRAM(S): 4; .5 INJECTION, POWDER, LYOPHILIZED, FOR SOLUTION INTRAVENOUS at 13:05

## 2024-05-06 RX ADMIN — CHLORHEXIDINE GLUCONATE 15 MILLILITER(S): 213 SOLUTION TOPICAL at 05:28

## 2024-05-06 RX ADMIN — Medication 53.6 MICROGRAM(S)/KG/MIN: at 05:36

## 2024-05-06 RX ADMIN — Medication 50 MILLIEQUIVALENT(S): at 18:02

## 2024-05-06 RX ADMIN — Medication 400 MILLIGRAM(S): at 14:24

## 2024-05-06 RX ADMIN — Medication 53.6 MICROGRAM(S)/KG/MIN: at 11:04

## 2024-05-06 RX ADMIN — Medication 50 MILLIGRAM(S): at 01:43

## 2024-05-06 RX ADMIN — Medication 2: at 12:13

## 2024-05-06 RX ADMIN — Medication 0.25 MILLIGRAM(S): at 05:16

## 2024-05-06 RX ADMIN — Medication 1000 MILLIGRAM(S): at 14:54

## 2024-05-06 RX ADMIN — ALBUTEROL 10 MILLIGRAM(S): 90 AEROSOL, METERED ORAL at 18:22

## 2024-05-06 RX ADMIN — Medication 250 MILLIGRAM(S): at 11:05

## 2024-05-06 RX ADMIN — Medication 100 MEQ/KG/HR: at 05:37

## 2024-05-06 RX ADMIN — PROPOFOL 6.05 MICROGRAM(S)/KG/MIN: 10 INJECTION, EMULSION INTRAVENOUS at 05:27

## 2024-05-06 RX ADMIN — VASOPRESSIN 6 UNIT(S)/MIN: 20 INJECTION INTRAVENOUS at 01:06

## 2024-05-06 RX ADMIN — HUMAN INSULIN 3 UNIT(S): 100 INJECTION, SUSPENSION SUBCUTANEOUS at 00:32

## 2024-05-06 RX ADMIN — VASOPRESSIN 6 UNIT(S)/MIN: 20 INJECTION INTRAVENOUS at 05:27

## 2024-05-06 RX ADMIN — HUMAN INSULIN 3 UNIT(S): 100 INJECTION, SUSPENSION SUBCUTANEOUS at 12:13

## 2024-05-06 RX ADMIN — Medication 25 GRAM(S): at 01:05

## 2024-05-06 RX ADMIN — Medication 4 UNIT(S): at 01:05

## 2024-05-06 RX ADMIN — CHLORHEXIDINE GLUCONATE 1 APPLICATION(S): 213 SOLUTION TOPICAL at 05:36

## 2024-05-06 RX ADMIN — VASOPRESSIN 6 UNIT(S)/MIN: 20 INJECTION INTRAVENOUS at 15:28

## 2024-05-06 RX ADMIN — Medication 200 GRAM(S): at 00:31

## 2024-05-06 RX ADMIN — PANTOPRAZOLE SODIUM 40 MILLIGRAM(S): 20 TABLET, DELAYED RELEASE ORAL at 11:06

## 2024-05-06 NOTE — PRE-ANESTHESIA EVALUATION ADULT - NSPREOPDXFT_GEN_ALL_CORE
Please call patient. Frieda spoke to the neurology team an they recommend an ultrasound of his carotids. Order placed, he should schedule.      hydronephrosis

## 2024-05-06 NOTE — PROGRESS NOTE ADULT - SUBJECTIVE AND OBJECTIVE BOX
-i was made aware of her hydronephrosis and sepsis today 5/6/24  -dr. bhardwaj booked patient for emergent cysto stent placement for renal decompression right side  -to OR for cysto right ureteral stent placement emergently given concerned for obstructed infected renal unit

## 2024-05-06 NOTE — PROGRESS NOTE ADULT - ASSESSMENT
74yo female, pmh HTN, T2DM on ozempic/insulin, CVA history w residual L-sided weakness, orthostatic hypotension, asthma presenting for fevers, chills, rigors, abdominal pain, nausea/vomiting, was found to be hypotensive, labs notable for leukocytosis 31.34, pH 7.22, lactate 7.7, UA positive for bacteria, CT showing colitis, admitted to MICU for septic shock secondary to colitis vs UTI, requiring pressors. S/p flex sig with GI, remains intubated following this procedure.     ===Neuro===  Intubated and sedated, AOx0  - Propofol gtt while intubated     #CVA  - history of CVA   - residual L-sided lower extremity weakness     ===Cardiovascular===  #Shock  - Most likely in setting of sepsis vs cardiogenic   - POCUS in ED showed reduced LV systolic function   - will obtain formal echo   - currently on levophed 0.92 and vaso,  Adding vaso and stress steroids 50mg q6     #HTN   - history of HTN   - on lisinopril 40mg and amlodipine 10mg, furosemide 20mg at home   - will hold in setting of septic shock     ===Respiratory===  - On room air, saturating well    #Asthma   - on mometasone-formoterol bid at home     ===GI===  #Colitis   - patient presenting with nausea, non-bilious/non-bloody emesis x 1 day duration  - no diarrhea  - on CT imaging- non-specific diffuse colitis   - Oral vanco and flagyl d/c, low suspicion for C. diff   - Gi pcr and c.diff pending     #Hepatic lesions   - 2.1 x 1.8cm lesion with peripheral hyperenhancement in R lobe.   - Other multiple ill-defined low density lesions   - per patient, she is aware of liver lesions, was discussed for biopsy but did not tolerate biopsy   - abscess vs malignancy    - MRI when more stable and potentially IR for liver biopsy   - will send for echinococcus and entamoeba     ===Renal===    #GERALDINE, on CRRT   - Scr 2.97 on presentation   - baseline Scr 1.2 on outpatient review   - This AM Cr 1.32 following initiation of CRRT as pt was anuric   - Likely prerenal in setting of sepsis   - will obtain urine lytes   - Nephrology recs       #Lactic acidosis  - Lactate 13.0 on presentation. This AM pH 7.28, lactate 8  - Remains on bicarb gtt  - most likely in setting of sepsis     #UTI   - UA positive for bacteria, leukocyte esterase, WBC   - septic shock   - ABG pH 7.09 lactate 9.7   - continue zosyn   - follow up bcx, urine cx       ===Infectious Disease===  #Sepsis   - secondary to colitis vs UTI   - in septic shock requiring pressors- on levophed and vaso   - pH 7.07, lactate 9.7   - stress dose steroids solu-cortef 50 q6   - UA positive for bacteria, will follow up ucx and bcx  - CT imaging showing diffuse colitis    - start zosyn and oral vancomycin and flagyl for c.diff (low suspicion)   - if continues to have worsening lactate/pH --> can add on amikacin   - for colitis, surgery consulted     ===Endocrine===  #DM   - patient on lantus 20units and ozempic at home   - can continue lantus 20u and DEL   - lantus 10u while NPO     ===Heme===  - Thrombocytopenia   - presentation 60, now 37  - on outpatient review, normal plt 195   - likely in setting of sepsis vs liver dysfunction   - will continue to monitor     DVT PPX:   - heparin sq     Ethics:   Full Code  HCP: Daughter Miranda  72yo female, pmh HTN, T2DM on ozempic/insulin, CVA history w residual L-sided weakness, orthostatic hypotension, asthma and PSHx sigmoidectomy 2/2 diverticulitis and cholecystectomy presenting for fevers, chills, rigors, abdominal pain, nausea/vomiting, was found to be hypotensive, labs notable for leukocytosis 31.34, pH 7.22, lactate 7.7, UA positive for bacteria, CT showing colitis, admitted to MICU for septic shock secondary to colitis vs UTI, etiology remains unclear. Continuing to require levophed and vaso. Remains CRRT due to anuria and lactic acidosis along w/ bicarb gtt. S/p flex sig with GI demonstrating healthy mucosa; intubated following procedure although off sedation.     ===Neuro===  AOx0, sluggish pupils and absent gag reflex  - Previously on propofol gtt while intubated, also received Versed 4 mg prior to flex sig (8PM 5/5)  - Now off sedation and still w/o mental status  - Can initiate Precedex if needed for agitation/pain     #CVA  - history of CVA   - residual L-sided lower extremity weakness     ===Cardiovascular===  #Shock  - Most likely in setting of sepsis  - TTE w/ EF 57%, less likely cardiogenic   - Remains on levophed (0.9) and vaso; c/w stress steroids 50mg q6    #HTN   - history of HTN   - on lisinopril 40mg and amlodipine 10mg, furosemide 20mg at home   - Continue to hold in setting of septic shock     ===Respiratory===  #Intubated  - Intubated prior to flexible sigmoidoscopy, remains intubated due to severe critical illness, although able to trigger breaths on the vent  - Vent settings: RR 30 |  | FiO2 30% | PEEP 5     #Asthma   - on mometasone-formoterol bid at home     ===GI===  #?Colitis   - patient presenting with nausea, non-bilious/non-bloody emesis x 1 day duration; per daughter, has had ongoing GI upset since starting Ozempic   - On CT imaging- non-specific diffuse colitis   - GI consulted; s/p flex sigmoidoscopy w/ healthy mucosa   - Must consider more proximal bowel perforation due to worsening clinical status and up-trending lactate   - Gi pcr and c.diff pending, although low likelihood due to lack of diarrhea. Oral vanc and flagyl d/c   - Of note, pt w/ history of sigmoidectomy 2/2 diverticulitis     #Hepatic lesions   - 2.1 x 1.8cm lesion with peripheral hype-renhancement in R lobe.   - Other multiple ill-defined low density lesions   - per patient, she is aware of liver lesions, was discussed for biopsy but did not tolerate biopsy   - abscess vs malignancy    - MRI when more stable and potentially IR for liver biopsy   - Echinococcus and entamoeba pending     #Hyperbilirubinemia  - Bili 5.1 5/6 AM, 3.6 on admission  - AST/ALT 78/49 from 53/32, relatively stable; AlkPhos downtrending to 355 from 588  - RUQ U/S ordered, pending   - Pt is s/p cholecystectomy    ===Renal===    #GERALDINE, on CRRT   - Scr 2.97 on presentation   - baseline Scr 1.2 on outpatient review   - This AM Cr 1.32 following initiation of CRRT as pt was anuric   - Nephrology recs       #Lactic acidosis  - Lactate 13.0 on presentation. 5/6 AM pH 7.28, lactate 8, however most recent ABG w/ pH 7.19, lactate 12.5  - C/w bicarb gtt  - Concern for bowel perforation although unremarkable abd exam vs alternative infectious source     #UTI   - UA positive for bacteria, leukocyte esterase, WBC   - C/w zosyn   - UCx in lab       ===Infectious Disease===  #Sepsis, bacteremia   - BCx + for gram positive cocci and gram negative rods, speciation pending  - C/w vanc (5/5-) and zosyn (5/5-) for now  - ID consult for further recommendations   - Secondary to colitis vs UTI vs alternative infectious source   - Remains on levophed and vaso as well as Solu-cortef 50 q6  - Most recent ABG w/ pH 7.19, lactate 12.5  - pH 7.07, lactate 9.7   - UA positive for bacteria, will follow up ucx and bcx  - CT imaging showing diffuse colitis although flex sig w/o evidence of ischemia (only most distal bowel examined)   - Surgery consulted in ED; rec serial abdominal exams, no emergent surgical interventions for now     ===Endocrine===  #DM   - patient on lantus 20units and ozempic at home   - While NPO, c/w NPH 3U q6 and low-dose SSI    ===Heme===  #Thrombocytopenia   - presentation 60, now 37  - on outpatient review, normal plt 195   - likely in setting of sepsis vs liver dysfunction   - will continue to monitor   - D/c heparin   - Fibrinogen clauss 390  - Low concern for DIC at this time     DVT PPX:   - SCDs  - Heparin Sq d/c due to thrombocytopenia     =====Ethics: ======  Full Code  HCP: Daughter Miranda

## 2024-05-06 NOTE — CONSULT NOTE ADULT - ASSESSMENT
F with urosepsis and hydronephrosis    -To OR emergently for cysto and right stent placement  -Consent obtained from family  -I explicitly discussed with family risks including bleeding infection worsening sepsis and death--family understood her mortality risk is high given the severity of her sepsis at this time  -Urology was consulted early afternoon 5/6/24 and booked for emergent cysto stent placement of right kidney    -pt was seen by me prior to her surgery

## 2024-05-06 NOTE — CONSULT NOTE ADULT - CONSULT REASON
colitis, flex sig
urosepsis hydronephrosis
Colitis
hydronephrosis
LOWELL BLUNT
Chu Bacteremia
Severe metabolic acidosis, GERALDINE, and anuria.

## 2024-05-06 NOTE — CONSULT NOTE ADULT - ASSESSMENT
73y Female with PMHx DM, HTN, HLD, asthma, CVA as per chart review, admitted for sepsis 2/2 pyelonephritis vs colitis. Urology consulted for hydronephrosis.   Pt intubated and sedated, family provided history, physical exam limited due to mental status. Altman placed. On vasopressors for BP support.   Labs WBC 31.23 / Hct 29.1 / SCr 1.32 / Lactate 8.3  UA + for leuk esterase and bacteria   CT A/P: Mild right hydroureteronephrosis with diffuse bladder wall thickening versus underdistention.    Recs  - C/W altman   - ABXs  - F/U UCx    - Wean pressors as tolerated  - Wean intubation as tolerated   - Rest of care per primary team     TO BE DISCUSSED WITH ATTENDING    [facility]  [address] [place] [address], [address]  [place] [address] [phone]  73y Female with PMHx DM, HTN, HLD, asthma, CVA as per chart review, admitted for sepsis 2/2 pyelonephritis vs colitis. Urology consulted for hydronephrosis.   Pt intubated and sedated, family provided history, physical exam limited due to mental status. Altman placed. On vasopressors for BP support.   Labs WBC 31.23 / Hct 29.1 / SCr 1.32 / Lactate 13.4  UA + for leuk esterase and bacteria   CT A/P: Mild right hydroureteronephrosis with diffuse bladder wall thickening versus underdistention.    Recs  - C/W altman   - ABXs  - F/U UCx    - Wean pressors as tolerated  - Wean intubation as tolerated   - NT placement for renal decompression   - Rest of care per primary team     TO BE DISCUSSED WITH ATTENDING    [facility]  [address] [place] [address], [address]  [place] [address] [phone]  73y Female with PMHx DM, HTN, HLD, asthma, CVA as per chart review, admitted for sepsis 2/2 pyelonephritis vs colitis. Urology consulted for hydronephrosis.   Pt intubated and sedated, family provided history, physical exam limited due to mental status. Altman placed. On vasopressors for BP support.   Labs WBC 31.23 / Hct 29.1 / SCr 1.32 / Lactate 13.4  UA + for leuk esterase and bacteria   CT A/P: Mild right hydroureteronephrosis with diffuse bladder wall thickening versus underdistention.    Recs  - IR consultation NT placement for renal decompression   - C/W altman   - ABXs  - F/U UCx    - Wean pressors as tolerated  - Wean vent as tolerated   - Rest of care per primary team   - D/W Dr. Harris, attending on call 73y Female with PMHx DM, HTN, HLD, asthma, CVA as per chart review, admitted for sepsis 2/2 pyelonephritis vs colitis. Urology consulted for hydronephrosis.   Pt intubated and sedated, family provided history, physical exam limited due to mental status. Altman placed. On vasopressors for BP support.   Labs WBC 31.23 / Hct 29.1 / SCr 1.32 / Lactate 13.4  UA + for leuk esterase and bacteria. Urine culture pending, Blood culture with E coli and MSRE prelim  CT A/P: Mild right hydroureteronephrosis with diffuse bladder wall thickening versus underdistention.    Recs  - IR consultation NT placement for renal decompression -> recommending attempt at cystoscopy with R ureteral stenting as pt with thrombocytopenia with increased risk of bleeding and risk placing pt prone while intubated.  - added on emergently for cystoscopy with R ureteral stent placement with Dr. Harris. Consent obtained from daughter (SURYA Jean).  - maintain altman for maximal drainage   - broad spectrum antibitoics  - follow up urine and blood cultures  - appreciate ID recs  - Wean pressors as tolerated  - Wean vent as tolerated   - Rest of care per primary team   - D/W Dr. Harris, attending on call

## 2024-05-06 NOTE — CONSULT NOTE ADULT - SUBJECTIVE AND OBJECTIVE BOX
Urology Consult Note    Chief Complaint:  urosepsis  hydronephrosis    History of Present Illness:  F with multiple medical problems. Presented with signs and symptoms of urosepsis. Intubated. On pressors. S/p prior colonoscopy. Urology consulted today to eval for hydronephrosis, Ucx growing e. coli    PAST MEDICAL & SURGICAL HISTORY:  Diabetes      HTN (hypertension)      HLD (hyperlipidemia)      Asthma      CVA (cerebral vascular accident)      Orthostatic hypotension          FAMILY HISTORY:      Allergies    No Known Allergies    Intolerances        Social History:  Per chart noted    Review of Systems:   Constitutional: No distress      Physical Exam:  Intubated  Ecchymotic  Cold skin    CT ap:  Mild right hydroureteronephrosis with diffuse bladder wall thickening   versus underdistention. Correlate with urinalysis for possible ascending   urinary tract infection.

## 2024-05-06 NOTE — CHART NOTE - NSCHARTNOTEFT_GEN_A_CORE
MEDICINE NOTE    Called to bedside to evaluate the patient for bradycardua .     On physical exam, patient did not respond to verbal or noxious stimuli.  Absent heart sounds.  Absent carotid pulses.   Pupils are fixed and dilated, no corneal reflex.  EKG rhythm strip shows asystole.  Patient pronounced dead at 18:45.  Attending notified.  Family declined autopsy.    Suzi Alonzo MD  Internal Medicine, PGY-3 MEDICINE NOTE    Called to bedside to evaluate the patient for bradycardia .     On physical exam, patient did not respond to verbal or noxious stimuli.  Absent heart sounds.  Absent carotid pulses.   Pupils are fixed and dilated, no corneal reflex.  EKG rhythm strip shows asystole.  Patient pronounced dead at 18:45.  Attending notified.  Family declined autopsy.    Suzi Alonzo MD  Internal Medicine, PGY-3

## 2024-05-06 NOTE — CHART NOTE - NSCHARTNOTEFT_GEN_A_CORE
Patient returned from OR Informed by anesthiescalating pressor requirements, maxed on epi, levo, . Duaghter informed of poosibility of imminent cardiac arrest given hyperkalemia and. Daughter agreed to not escalate care Patient returned to MICU from OR after undergoing right ureteral stent placement. Informed by anesthesiologist that patient had cardiac arrest in OR prior to start of procedure. In OR, patient became bradycardic and then went into PEA arrest. Multiple rounds of chest compressions performed, epix4-5 given. Patient was shocked once for Vtach, and given lidocaine 1 mg. Patient given multiple amps of sodium bicarb, calcium chloride, insulin and D50 for hyperkalemia (OR ABG showed K 7.7), and started on epinephrine drip. ROSC achieved after approximately 5 minutes. On arrival to MICU, patient on three vasopressors: levophed 0.8, vaso 0.04, and epinephrine 0.127. Unable to restart CRRT for worsening hyperkalemia and acidosis as on maximum doses of three vasopressors and remained hypotensive. Discussed patient's critical condition with patient's daughter. Explained events that occurred in OR   scalating pressor requirements, maxed on epi, levo, . Juani informed of poosibility of imminent cardiac arrest given hyperkalemia and. Daughter agreed to not escalate care Patient returned to MICU from OR after undergoing right ureteral stent placement. Informed by anesthesiologist that patient had cardiac arrest in OR prior to start of procedure. Patient became bradycardic and then went into PEA arrest. Multiple rounds of chest compressions performed, epix4-5 given. Patient was shocked once for Vtach, and given lidocaine 1 mg. Patient given multiple amps of sodium bicarb, calcium chloride, insulin and D50 for hyperkalemia (OR ABG showed K 7.7), and started on epinephrine drip. ROSC achieved after approximately 5 minutes. On arrival to MICU, patient on three vasopressors: levophed 0.8, vaso 0.04, and epinephrine 0.127 with MAPs in 50s. Levophed increased to 1 without improvement in BP. Unable to restart CRRT for worsening hyperkalemia and acidosis as patient remained hypotensive despite being on maximum doses of three vasopressors. Discussed patient's critical condition with patient's daughter and her . Explained events that occurred in OR and increasing pressor requirements since returning from OR. Informed daughter of possibility of imminent cardiac arrest due to elevated potassium and acid levels in the blood. Explained that we are unable to restart continuous dialysis given patient's low blood pressure and that adding a fourth vasopressor unlikely to change prognosis or provide any benefit given overwhelming infection, renal failure, and lack of improvements with vasopressors and antibiotics. Daughter in agreement with not escalating care and made patient DNR.    Suzi Alonzo MD  Internal Medicine PGY3

## 2024-05-06 NOTE — CONSULT NOTE ADULT - ASSESSMENT
Impression/Hospital Course:  74yo female, pmh HTN, T2DM on ozempic/insulin, CVA history w residual L-sided weakness, orthostatic hypotension, asthma presenting for fevers, chills, rigor, abdominal pain, nausea, and vomiting x 1 day duration. Patient had one episode of non-bloody/non-bilious emesis, was brought in by EMS when patient was found on floor by her health aide. Patient also reports recurrent falls recently, had a fall the day before admission where EMS was called, patient was evaluated. [facility] prior to the fall. In ED, patient vitals was found to be hypotensive and tachycardic. Labs were significant for WBC 31.34 with 17% BANDS which trended up to 50.47 and now 31.23, PLTs 60 now 37, Alk phos 2828 to 328 to 583 now 378, BUN/Cr 38/2.97 to 34/2.3 now 15/0.93, Bilirubin 3.6 to 3.9 to 5.1 now 4.7, AST 53 now 139, ALT 32 now 56, , haptoglobin 170, Procal 45.31, , lactate 7.7 to 9.76 now 13.4 and UA with large leukocyte esterase, >998 WBC and TNTC bacteria. Imaging obtained was a CT abd/pelv w/ IV contrast showing 2.1 x 1.8 cm lesion with peripheral hyperenhancement within the dome of the RIGHT lobe of the liver with other hypodense regions, mild R hydro with bladder wall thickening, and nonspecific colitis, CT head negative for acute findings and CXR showing no consolidation Now blood culture from 5/5 growing 1/2 bottles of E.coli and MRSE.    Antimicrobials:  vancomycin 5/5 - current  piperacillin/tazobactam 5/5 - current   metronidazole 5/5   Vancomycin PO x1 dose on 5/5    Assessment:  *E.coli bacteremia in 1/2 bottles from blood culture on 5/5 secondary to Cholangitis (suspected using [place] criteria), patient with subjective fevers/chills, AST/ALT elevation, thrombocytopenia, elevated INR, and hyperbilirubinemia vs liver abscess with 2.1 x1.8cm peripherally enhancing lesion seen on CT. Less likely this is due to urinary source especially without urinary complaints, positive UA could be hematogenous spread and less likely colitis though these are both contributing to acute clinical condition. Unlikely Echinococcus as it usually doesn't present with septic presentation and majority of symptoms are caused due to size of cyst rather than infectious etiology.  *Septic shock as evidenced by tachycardia, leukocytosis (BAnds of 17%) with lactic acidosis secondayr to above  *GERALDINE with hydronephrosis seen on CT, improving  *Lactic acidosis  *Transaminitis  *Hyperbilirubinemia, unlikely secondary to hemolysis due to normal haptoglobin  *elevated LDH  *Elevated procal  *Thrombocytopenia unlikely HUS/TTP due to lack of hemolysis   *CVA history w residual L-sided weakness  *DM on insulin and Ozempic, ozempic will increase likelihood of development of UTI     Recommendations: PLEASE DEFER ALL CHANGES IN PLAN UNTIL SIGNED BY ATTENDING. All recommendations are tentative pending Attending Attestation.  - continue piperacillin/tazobactam for treatment of bacteremia and cholangitis, this will also provide coverage of colitis and UTI  - no need for metronidazole   - repeat blood culture x2 in am to assure clearance due to unclear source  - can send Echinococcus antibody though this is unlikely   - if patient with recurrent UTIs could benefit from changing DM regiment away from SGLT2  - there is concern for source control in the setting of abscess though it is small   - trend temperature, AST/ALT/Alk phos, BUN/Cr, Bilirubin and WBC/BANDs curve to monitor the progression of infection   - anemia/thrombocytopenia per primary team  - shock and lactic acidosis per critical care team     Abilio Alba DO, PGY-4   Infectious Disease Fellow  [facility] Teams Preferred  After 5pm/weekends call [phone]  Impression/Hospital Course:  72yo female, pmh HTN, T2DM on ozempic/insulin, CVA history w residual L-sided weakness, orthostatic hypotension, asthma presenting for fevers, chills, rigor, abdominal pain, nausea, and vomiting x 1 day duration. Patient had one episode of non-bloody/non-bilious emesis, was brought in by EMS when patient was found on floor by her health aide. Patient also reports recurrent falls recently, had a fall the day before admission where EMS was called, patient was evaluated. [facility] reported feeling [facility]zy prior to the fall. In ED, patient vitals was found to be hypotensive and tachycardic. Labs were significant for WBC 31.34 with 17% BANDS which trended up to 50.47 and now 31.23, PLTs 60 now 37, Alk phos 2828 to 328 to 583 now 378, BUN/Cr 38/2.97 to 34/2.3 now 15/0.93, Bilirubin 3.6 to 3.9 to 5.1 now 4.7, AST 53 now 139, ALT 32 now 56, , haptoglobin 170, Procal 45.31, , lactate 7.7 to 9.76 now 13.4 and UA with large leukocyte esterase, >998 WBC and TNTC bacteria. Imaging obtained was a CT abd/pelv w/ IV contrast showing 2.1 x 1.8 cm lesion with peripheral hyperenhancement within the dome of the RIGHT lobe of the liver with other hypodense regions, mild R hydro with bladder wall thickening, and nonspecific colitis, CT head negative for acute findings and CXR showing no consolidation Now blood culture from 5/5 growing 1/2 bottles of E.coli and MRSE.    Antimicrobials:  vancomycin 5/5 - current  piperacillin/tazobactam 5/5 - current   metronidazole 5/5   Vancomycin PO x1 dose on 5/5    Assessment:  *E.coli bacteremia in 1/2 bottles from blood culture on 5/5 secondary to Cholangitis (suspected using [place] criteria), patient with subjective fevers/chills, AST/ALT elevation, thrombocytopenia, elevated INR, and hyperbilirubinemia vs liver abscess with 2.1 x1.8cm peripherally enhancing lesion seen on CT. Less likely this is due to urinary source especially without urinary complaints though still could be causeative agent, positive UA could be hematogenous spread and less likely colitis though these are both contributing to acute clinical condition. Unlikely Echinococcus as it usually doesn't present with septic presentation and majority of symptoms are caused due to size of cyst rather than infectious etiology.  *Septic shock as evidenced by tachycardia, leukocytosis (BAnds of 17%) with lactic acidosis secondary to above  *GERALDINE on CRRT with hydronephrosis seen on CT  *Lactic acidosis  *Transaminitis due to cholangitis vs shock  *Hyperbilirubinemia, unlikely secondary to hemolysis due to normal haptoglobin  *elevated LDH  *Elevated procal  *Thrombocytopenia unlikely HUS/TTP due to lack of hemolysis   *CVA history w residual L-sided weakness  *DM on insulin and Ozempic, ozempic will increase likelihood of development of UTI     Recommendations: PLEASE DEFER ALL CHANGES IN PLAN UNTIL SIGNED BY ATTENDING. All recommendations are tentative pending Attending Attestation.  - continue piperacillin/tazobactam for treatment of bacteremia and cholangitis, this will also provide coverage of colitis and UTI  - no need for metronidazole   - repeat blood culture x2 in am to assure clearance due to unclear source  - can send Echinococcus antibody though this is unlikely   - if patient with recurrent UTIs could benefit from changing DM regiment away from SGLT2  - there is concern for source control in the setting of abscess though it is small   - trend temperature, AST/ALT/Alk phos, BUN/Cr, Bilirubin and WBC/BANDs curve to monitor the progression of infection   - anemia/thrombocytopenia per primary team  - shock and lactic acidosis per critical care team     Abilio Alba DO, PGY-4   Infectious Disease Fellow  [facility] Teams Preferred  After 5pm/weekends call [phone]  Impression/Hospital Course:  72yo female, pmh HTN, T2DM on ozempic/insulin, CVA history w residual L-sided weakness, orthostatic hypotension, asthma presenting for fevers, chills, rigor, abdominal pain, nausea, and vomiting x 1 day duration. Patient had one episode of non-bloody/non-bilious emesis, was brought in by EMS when patient was found on floor by her health aide. Patient also reports recurrent falls recently, had a fall the day before admission where EMS was called, patient was evaluated. [facility] feeling [facility]zy prior to the fall. In ED, patient vitals was found to be hypotensive and tachycardic. Labs were significant for WBC 31.34 with 17% BANDS which trended up to 50.47 and now 31.23, PLTs 60 now 37, Alk phos 2828 to 328 to 583 now 378, BUN/Cr 38/2.97 to 34/2.3 now 15/0.93, Bilirubin 3.6 to 3.9 to 5.1 now 4.7, AST 53 now 139, ALT 32 now 56, , haptoglobin 170, Procal 45.31, , lactate 7.7 to 9.76 now 13.4 and UA with large leukocyte esterase, >998 WBC and TNTC bacteria. Imaging obtained was a CT abd/pelv w/ IV contrast showing 2.1 x 1.8 cm lesion with peripheral hyperenhancement within the dome of the RIGHT lobe of the liver with other hypodense regions, mild R hydro with bladder wall thickening, and nonspecific colitis, CT head negative for acute findings and CXR showing no consolidation Now blood culture from 5/5 growing 1/2 bottles of E.coli and MRSE.    Antimicrobials:  vancomycin 5/5 - current  piperacillin/tazobactam 5/5 - current   metronidazole 5/5   Vancomycin PO x1 dose on 5/5    Assessment:  *E.coli bacteremia in 1/2 bottles from blood culture on 5/5 secondary to Cholangitis (suspected using [place] criteria), patient with subjective fevers/chills, AST/ALT elevation, thrombocytopenia, elevated INR, and hyperbilirubinemia vs liver abscess with 2.1 x1.8cm peripherally enhancing lesion seen on CT. Less likely this is due to urinary source especially without urinary complaints though still could be causeative agent, positive UA could be hematogenous spread and less likely colitis though these are both contributing to acute clinical condition. Unlikely Echinococcus as it usually doesn't present with septic presentation and majority of symptoms are caused due to size of cyst rather than infectious etiology.  *Septic shock as evidenced by tachycardia, leukocytosis (BAnds of 17%) with lactic acidosis secondary to above  *MRSE in blood culture most likely indicative of procurement contaminant   *GERALDINE on CRRT with hydronephrosis seen on CT  *Lactic acidosis  *Transaminitis due to cholangitis vs shock  *Hyperbilirubinemia, unlikely secondary to hemolysis due to normal haptoglobin  *elevated LDH  *Elevated procal  *Thrombocytopenia unlikely HUS/TTP due to lack of hemolysis   *CVA history w residual L-sided weakness  *DM on insulin and Ozempic, ozempic will increase likelihood of development of UTI     Recommendations:  - continue piperacillin/tazobactam for treatment of bacteremia and cholangitis, this will also provide coverage of colitis and UTI  - no need for metronidazole or vancomycin   - repeat blood culture x2 in am to assure clearance due to unclear source  - can send Echinococcus antibody though this is unlikely   - obtain leptospirosis antibody   - patient may benefit from MRCP to assess liver and biliary tree but due to acute clinical condition she may not be able to tolerate it  - there is concern for source control in the setting of abscess though it is small   - trend temperature, AST/ALT/Alk phos, BUN/Cr, Bilirubin and WBC/BANDs curve to monitor the progression of infection   - anemia/thrombocytopenia per primary team  - shock and lactic acidosis per critical care team     Abilio Alba DO, PGY-4   Infectious Disease Fellow  [facility] Teams Preferred  After 5pm/weekends call [phone]

## 2024-05-06 NOTE — PROGRESS NOTE ADULT - ATTENDING COMMENTS
73F w/ HTN, DM, CVA, asthma. Presents w/ fever, abdominal pain. Found to be hypotensive w/ GERALDINE, lactic acidosis, +UA and CT consistent w/ colitis. Admitted to ICU for further management. Lactic acidosis worsened overnight, s/p flex sigmoscopy performed w/ reportedly normal looking mucosa, intubated for procedure and started on CRRT for anuria and worsening acidosis. Also on escalating doses of pressors and now on 2 pressors.     Sedation turned off this morning for SAT, remains sedated and not following commands, likely toxic metabolic encephalopathy; CTH on admission ok. Currently in shock state, suspect septic shock - TTE did not show evidence for cardiogenic component; continue vasopressin and norepi, titrate to MAP >/65-70; continue stress steroids. Remains intubated post-procedure in setting of significant metabolic derrangements; hold off on SBT for now given continued acidosis. Continue broad spectrum coverage with vancomycin and zosyn, f/u blood cx, urine cx, MRSA PCR; get ID consult. GERALDINE likely prerenal ATN, continue CRRT, keep net even; f/u ABG and will decide on whether or not to continue bicarb gtt; appreciate renal input; get renal US to assess hydronephrosis and get urology consult in setting of hydronephrosis seen on POCUS. Keep NPO given severity of shock state, start PPI while NPO; get RUQ US w/ elevated LFTs and rising T bili- noted to have ill defined lesions on CT abd in R lobe of liver, which are reportedly known to patient - could this be something that is now an abscess??. Thrombocytopenia likely due to sepsis, hold pharmacologic ppx, SCDS for ppx. FS w/ ISS. NPH for hyperglycemia. Prognosis very guarded. Full code.     Plan of care discussed w/ pt's daughter Miranda at bedside, all questions answered 73F w/ HTN, DM, CVA, asthma. Presents w/ fever, abdominal pain. Found to be hypotensive w/ GERALDINE, lactic acidosis, +UA and CT consistent w/ colitis. Admitted to ICU for further management. Lactic acidosis worsened overnight, s/p flex sigmoscopy performed w/ reportedly normal looking mucosa, intubated for procedure and started on CRRT for anuria and worsening acidosis. Also on escalating doses of pressors and now on 2 pressors.     Sedation turned off this morning for SAT, remains sedated and not following commands, likely toxic metabolic encephalopathy; CTH on admission ok. Currently in shock state, suspect septic shock - TTE did not show evidence for cardiogenic component; continue vasopressin and norepi, titrate to MAP >/65-70; continue stress steroids. Remains intubated post-procedure in setting of significant metabolic derrangements; hold off on SBT for now given continued acidosis. Continue broad spectrum coverage with vancomycin and zosyn- likely UTI +/- colitis, f/u blood cx, urine cx, MRSA PCR; get ID consult. GERALDINE likely prerenal ATN, continue CRRT, keep net even; f/u ABG and will decide on whether or not to continue bicarb gtt; appreciate renal input; get renal US to assess hydronephrosis and get urology consult in setting of hydronephrosis seen on POCUS. Keep NPO given severity of shock state, start PPI while NPO; get RUQ US w/ elevated LFTs and rising T bili- noted to have ill defined lesions on CT abd in R lobe of liver, which are reportedly known to patient - could this be something that is now an abscess??. Thrombocytopenia likely due to sepsis, hold pharmacologic ppx, SCDS for ppx. FS w/ ISS. NPH for hyperglycemia. Prognosis very guarded. Full code.     Plan of care discussed w/ pt's daughter Miranda at bedside, all questions answered

## 2024-05-06 NOTE — PROGRESS NOTE ADULT - REASON FOR ADMISSION
Pt. Discharged to [facility] via ambulance.  Discharge education complete, discharge packet / instructions given to EMS, and all personal belongings given to significant other.   Lizeth Coburn RN   
Sepsis

## 2024-05-06 NOTE — CONSULT NOTE ADULT - SUBJECTIVE AND OBJECTIVE BOX
HPI:  73y Female with PMHx DM, HTN, HLD, asthma, CVA as per chart review, admitted for sepsis 2/2 pyelonephritis vs colitis. Urology consulted for hydronephrosis.   Pt seen at bedside, intubated and sedated. History obtained from family at bedside.   Family denies h/o frequent UTI, urologic disorders, h/o seeing a urologist.     PAST MEDICAL & SURGICAL HISTORY:  Diabetes      HTN (hypertension)      HLD (hyperlipidemia)      Asthma      CVA (cerebral vascular accident)      Orthostatic hypotension          FAMILY HISTORY:  No known  malignancy     Social History:  Lives at home by herself. Has health aide at home. Uses walker.   No substance use history (05 May 2024 13:10)  Denies alcohol and drug abuse, nonsmoker     MEDICATIONS  (STANDING):  buDESOnide    Inhalation Suspension 0.25 milliGRAM(s) Inhalation every 12 hours  chlorhexidine 0.12% Liquid 15 milliLiter(s) Oral Mucosa every 12 hours  chlorhexidine 4% Liquid 1 Application(s) Topical <User Schedule>  CRRT Treatment    <Continuous>  hydrocortisone sodium succinate Injectable 50 milliGRAM(s) IV Push every 6 hours  insulin lispro (ADMELOG) corrective regimen sliding scale   SubCutaneous every 6 hours  insulin NPH human recombinant 3 Unit(s) SubCutaneous every 6 hours  norepinephrine Infusion 0.85 MICROgram(s)/kG/Min (53.6 mL/Hr) IV Continuous <Continuous>  pantoprazole  Injectable 40 milliGRAM(s) IV Push daily  Phoxillum Filtration BK 4 / 2.5 5000 milliLiter(s) (200 mL/Hr) CRRT <Continuous>  piperacillin/tazobactam IVPB.. 3.375 Gram(s) IV Intermittent every 8 hours  PrismaSATE Dialysate BK 0 / 3.5 5000 milliLiter(s) (1500 mL/Hr) CRRT <Continuous>  PrismaSOL Filtration BGK 0 / 2.5 5000 milliLiter(s) (1300 mL/Hr) CRRT <Continuous>  propofol Infusion 15 MICROgram(s)/kG/Min (6.05 mL/Hr) IV Continuous <Continuous>  sodium bicarbonate  Infusion 0.2 mEq/kG/Hr (100 mL/Hr) IV Continuous <Continuous>  vancomycin  IVPB 1000 milliGRAM(s) IV Intermittent every 24 hours  vasopressin Infusion 0.04 Unit(s)/Min (6 mL/Hr) IV Continuous <Continuous>    MEDICATIONS  (PRN):  albuterol/ipratropium for Nebulization 3 milliLiter(s) Nebulizer every 6 hours PRN Shortness of Breath and/or Wheezing  sodium chloride 0.9% lock flush 10 milliLiter(s) IV Push every 1 hour PRN Pre/post blood products, medications, blood draw, and to maintain line patency    Allergies    No Known Allergies    Intolerances      REVIEW OF SYSTEMS: Pertinent positives and negatives as stated in HPI, otherwise negative    Vital signs  T(C): 37.4 (05-06-24 @ 12:00), Max: 37.9 (05-06-24 @ 04:00)  HR: 107 (05-06-24 @ 12:00)  BP: 133/67 (05-05-24 @ 14:45)  SpO2: 97% (05-06-24 @ 12:00)  Wt(kg): --    Physical Exam  Gen: Sedated   HEENT: normocephalic, atraumatic  Pulm: Intubated   Abd: Soft, NT, ND, no rebound tenderness or guarding  : Blanco catheter in place, draining   MSK:  No CVAT  NEURO: Sedated   SKIN: warm, dry, no rash.    LABS:  CBC                       9.9    31.23 )-----------( 37       ( 05 May 2024 23:09 )             29.1     BMP   05-06    134<L>  |  100  |  22  ----------------------------<  197<H>  4.8   |  13<L>  |  1.32<H>    Ca    7.8<L>      06 May 2024 05:11  Phos  3.4     05-06  Mg     2.3     05-06    TPro  5.1<L>  /  Alb  2.3<L>  /  TBili  5.1<H>  /  DBili  x   /  AST  78<H>  /  ALT  49<H>  /  AlkPhos  355<H>  05-06    PT/INR - ( 05 May 2024 23:08 )   PT: 20.4 sec;   INR: 1.89 ratio         PTT - ( 05 May 2024 15:52 )  PTT:34.8 sec    Urinalysis Basic - ( 06 May 2024 05:11 )    Color: x / Appearance: x / SG: x / pH: x  Gluc: 197 mg/dL / Ketone: x  / Bili: x / Urobili: x   Blood: x / Protein: x / Nitrite: x   Leuk Esterase: x / RBC: x / WBC x   Sq Epi: x / Non Sq Epi: x / Bacteria: x      Urine Cx: pending  Blood Cx: Culture - Blood (05.05.24 @ 09:35)   Gram Stain:   Growth in aerobic bottle: Gram Negative Rods and Gram Positive Cocci in   Clusters  - Escherichia coli: Detec  - Staphylococcus epidermidis, Methicillin resistant: Detec  Specimen Source: .Blood Blood-Peripheral  Organism: Blood Culture PCR  Culture Results:   Growth in aerobic bottle: Gram Negative Rods and Gram Positive Cocci in   Clusters   Direct identification is available within approximately 3-5   hours either by Blood Panel Multiplexed PCR or Direct   MALDI-TOF. Details: [email or web address]  Organism Identification: Blood Culture PCR  Method Type: PCR    Radiology:   < from: CT Abdomen and Pelvis w/ IV Cont (05.05.24 @ 10:22) >    ACC: [id number] EXAM:  CT ABDOMEN AND PELVIS IC   ORDERED BY: LEW HERNANDEZ     PROCEDURE DATE:  05/05/2024          INTERPRETATION:  CLINICAL INFORMATION: Slurred speech, septic shock    COMPARISON: None.    CONTRAST/COMPLICATIONS:  IV Contrast: Omnipaque 350  90 cc administered   10 cc discarded  Oral Contrast: NONE  Complications: None reported at time of study completion    PROCEDURE:  CT of the Abdomen and Pelvis was performed.  Sagittal and coronal reformats were performed.    FINDINGS:  LOWER CHEST: Scattered bilateral subsegmental atelectasis. Coronary   artery and aortic calcifications.. Mitral annular calcifications.    LIVER: Steatosis. Hepatomegaly. There is a 2.1 x 1.8 cm lesion with   peripheral hyperenhancement within the dome of the RIGHT lobe of the   liver (301-15). Ill-defined low density region anterior and superior to   this (301-13) is also indeterminate.  BILE DUCTS: Normal caliber.  GALLBLADDER: Cholecystectomy.  SPLEEN: Within normal limits.  PANCREAS: Within normal limits.  ADRENALS: Within normal limits.  KIDNEYS/URETERS: Mild right hydroureteronephrosis. No nephrolithiasis. No   left hydronephrosis.    BLADDER: Diffuse bladder wall thickening versus underdistention.  REPRODUCTIVE ORGANS: Uterus and adnexa within normal limits.    BOWEL: No bowel obstruction. Appendix is normal. There is diffuse   thickening of nearly the entirety of the colon with relative sparing of   the cecum and the rectum distal to the anastomosis. Findings are   consistent with a diffuse colitis.    PERITONEUM: No ascites.  VESSELS: Atherosclerotic changes.  RETROPERITONEUM/LYMPH NODES: There are prominent rounded slightly   hyperenhancing abnormal-appearing para-aortic lymph nodes. For example,   there is a rounded hyperenhancing node measuring 13 x 12 mm (301-57)   anterior to the aorta.  ABDOMINAL WALL: Small fat-containing umbilical hernia. Fat containing   RIGHT spigelian hernia.  BONES: Within normal limits.    IMPRESSION:  1.  Mild right hydroureteronephrosis with diffuse bladder wall thickening   versus underdistention. Correlate with urinalysis for possible ascending   urinary tract infection.  2.  Multiple ill-defined low density lesions within the RIGHT lobe of the   liver. Differential diagnosis includes metastases, infection/developing   abscess, or atypical masslike focal steatosis. MRI of the liver with   intravenous contrast is recommended for further evaluation.  3.  Diffuse nonspecific colitis.        --- End of Report ---      < end of copied text >   HPI:  73y Female with PMHx DM, HTN, HLD, asthma, CVA with residual L-sided weakness, orthostatic hypotension as per chart review, admitted for sepsis 2/2 pyelonephritis vs colitis. Urology consulted for hydronephrosis.   Pt seen at bedside, intubated and sedated. History obtained from family at bedside.   Family denies h/o frequent UTI, urologic disorders, h/o seeing a urologist.     PAST MEDICAL & SURGICAL HISTORY:  Diabetes      HTN (hypertension)      HLD (hyperlipidemia)      Asthma      CVA (cerebral vascular accident)      Orthostatic hypotension          FAMILY HISTORY:  No known  malignancy     Social History:  Lives at home by herself. Has health aide at home. Uses walker.   No substance use history (05 May 2024 13:10)  Denies alcohol and drug abuse, nonsmoker     MEDICATIONS  (STANDING):  buDESOnide    Inhalation Suspension 0.25 milliGRAM(s) Inhalation every 12 hours  chlorhexidine 0.12% Liquid 15 milliLiter(s) Oral Mucosa every 12 hours  chlorhexidine 4% Liquid 1 Application(s) Topical <User Schedule>  CRRT Treatment    <Continuous>  hydrocortisone sodium succinate Injectable 50 milliGRAM(s) IV Push every 6 hours  insulin lispro (ADMELOG) corrective regimen sliding scale   SubCutaneous every 6 hours  insulin NPH human recombinant 3 Unit(s) SubCutaneous every 6 hours  norepinephrine Infusion 0.85 MICROgram(s)/kG/Min (53.6 mL/Hr) IV Continuous <Continuous>  pantoprazole  Injectable 40 milliGRAM(s) IV Push daily  Phoxillum Filtration BK 4 / 2.5 5000 milliLiter(s) (200 mL/Hr) CRRT <Continuous>  piperacillin/tazobactam IVPB.. 3.375 Gram(s) IV Intermittent every 8 hours  PrismaSATE Dialysate BK 0 / 3.5 5000 milliLiter(s) (1500 mL/Hr) CRRT <Continuous>  PrismaSOL Filtration BGK 0 / 2.5 5000 milliLiter(s) (1300 mL/Hr) CRRT <Continuous>  propofol Infusion 15 MICROgram(s)/kG/Min (6.05 mL/Hr) IV Continuous <Continuous>  sodium bicarbonate  Infusion 0.2 mEq/kG/Hr (100 mL/Hr) IV Continuous <Continuous>  vancomycin  IVPB 1000 milliGRAM(s) IV Intermittent every 24 hours  vasopressin Infusion 0.04 Unit(s)/Min (6 mL/Hr) IV Continuous <Continuous>    MEDICATIONS  (PRN):  albuterol/ipratropium for Nebulization 3 milliLiter(s) Nebulizer every 6 hours PRN Shortness of Breath and/or Wheezing  sodium chloride 0.9% lock flush 10 milliLiter(s) IV Push every 1 hour PRN Pre/post blood products, medications, blood draw, and to maintain line patency    Allergies    No Known Allergies    Intolerances      REVIEW OF SYSTEMS: Pertinent positives and negatives as stated in HPI, otherwise negative    Vital signs  T(C): 37.4 (05-06-24 @ 12:00), Max: 37.9 (05-06-24 @ 04:00)  HR: 107 (05-06-24 @ 12:00)  BP: 133/67 (05-05-24 @ 14:45)  SpO2: 97% (05-06-24 @ 12:00)  Wt(kg): --    Physical Exam  Gen: Sedated   HEENT: normocephalic, atraumatic  Pulm: Intubated   Abd: Soft, NT, ND, no rebound tenderness or guarding  : Blanco catheter in place, draining   MSK:  No CVAT  NEURO: Sedated   SKIN: warm, dry, no rash.    LABS:  CBC                       9.9    31.23 )-----------( 37       ( 05 May 2024 23:09 )             29.1     BMP   05-06    134<L>  |  100  |  22  ----------------------------<  197<H>  4.8   |  13<L>  |  1.32<H>    Ca    7.8<L>      06 May 2024 05:11  Phos  3.4     05-06  Mg     2.3     05-06    TPro  5.1<L>  /  Alb  2.3<L>  /  TBili  5.1<H>  /  DBili  x   /  AST  78<H>  /  ALT  49<H>  /  AlkPhos  355<H>  05-06    PT/INR - ( 05 May 2024 23:08 )   PT: 20.4 sec;   INR: 1.89 ratio         PTT - ( 05 May 2024 15:52 )  PTT:34.8 sec    Urinalysis Basic - ( 06 May 2024 05:11 )    Color: x / Appearance: x / SG: x / pH: x  Gluc: 197 mg/dL / Ketone: x  / Bili: x / Urobili: x   Blood: x / Protein: x / Nitrite: x   Leuk Esterase: x / RBC: x / WBC x   Sq Epi: x / Non Sq Epi: x / Bacteria: x      Urine Cx: pending  Blood Cx: Culture - Blood (05.05.24 @ 09:35)   Gram Stain:   Growth in aerobic bottle: Gram Negative Rods and Gram Positive Cocci in   Clusters  - Escherichia coli: Detec  - Staphylococcus epidermidis, Methicillin resistant: Detec  Specimen Source: .Blood Blood-Peripheral  Organism: Blood Culture PCR  Culture Results:   Growth in aerobic bottle: Gram Negative Rods and Gram Positive Cocci in   Clusters   Direct identification is available within approximately 3-5   hours either by Blood Panel Multiplexed PCR or Direct   MALDI-TOF. Details: [email or web address]  Organism Identification: Blood Culture PCR  Method Type: PCR    Radiology:   < from: CT Abdomen and Pelvis w/ IV Cont (05.05.24 @ 10:22) >    ACC: [id number] EXAM:  CT ABDOMEN AND PELVIS IC   ORDERED BY: LEW HERNANDEZ     PROCEDURE DATE:  05/05/2024          INTERPRETATION:  CLINICAL INFORMATION: Slurred speech, septic shock    COMPARISON: None.    CONTRAST/COMPLICATIONS:  IV Contrast: Omnipaque 350  90 cc administered   10 cc discarded  Oral Contrast: NONE  Complications: None reported at time of study completion    PROCEDURE:  CT of the Abdomen and Pelvis was performed.  Sagittal and coronal reformats were performed.    FINDINGS:  LOWER CHEST: Scattered bilateral subsegmental atelectasis. Coronary   artery and aortic calcifications.. Mitral annular calcifications.    LIVER: Steatosis. Hepatomegaly. There is a 2.1 x 1.8 cm lesion with   peripheral hyperenhancement within the dome of the RIGHT lobe of the   liver (301-15). Ill-defined low density region anterior and superior to   this (301-13) is also indeterminate.  BILE DUCTS: Normal caliber.  GALLBLADDER: Cholecystectomy.  SPLEEN: Within normal limits.  PANCREAS: Within normal limits.  ADRENALS: Within normal limits.  KIDNEYS/URETERS: Mild right hydroureteronephrosis. No nephrolithiasis. No   left hydronephrosis.    BLADDER: Diffuse bladder wall thickening versus underdistention.  REPRODUCTIVE ORGANS: Uterus and adnexa within normal limits.    BOWEL: No bowel obstruction. Appendix is normal. There is diffuse   thickening of nearly the entirety of the colon with relative sparing of   the cecum and the rectum distal to the anastomosis. Findings are   consistent with a diffuse colitis.    PERITONEUM: No ascites.  VESSELS: Atherosclerotic changes.  RETROPERITONEUM/LYMPH NODES: There are prominent rounded slightly   hyperenhancing abnormal-appearing para-aortic lymph nodes. For example,   there is a rounded hyperenhancing node measuring 13 x 12 mm (301-57)   anterior to the aorta.  ABDOMINAL WALL: Small fat-containing umbilical hernia. Fat containing   RIGHT spigelian hernia.  BONES: Within normal limits.    IMPRESSION:  1.  Mild right hydroureteronephrosis with diffuse bladder wall thickening   versus underdistention. Correlate with urinalysis for possible ascending   urinary tract infection.  2.  Multiple ill-defined low density lesions within the RIGHT lobe of the   liver. Differential diagnosis includes metastases, infection/developing   abscess, or atypical masslike focal steatosis. MRI of the liver with   intravenous contrast is recommended for further evaluation.  3.  Diffuse nonspecific colitis.        --- End of Report ---      < end of copied text >   HPI:  73y Female with PMHx DM, HTN, HLD, asthma, CVA with residual L-sided weakness, orthostatic hypotension as per chart review, admitted for sepsis 2/2 pyelonephritis vs colitis. Urology consulted for hydronephrosis.   Pt seen at bedside, intubated and sedated. History obtained from family at bedside.   Family denies h/o frequent UTI, urologic disorders, h/o seeing a urologist.     GI consulted for flexible sigmoidoscopy - healthy mucosa     PAST MEDICAL & SURGICAL HISTORY:  Diabetes      HTN (hypertension)      HLD (hyperlipidemia)      Asthma      CVA (cerebral vascular accident)      Orthostatic hypotension          FAMILY HISTORY:  No known  malignancy     Social History:  Lives at home by herself. Has health aide at home. Uses walker.   No substance use history (05 May 2024 13:10)  Denies alcohol and drug abuse, nonsmoker     MEDICATIONS  (STANDING):  buDESOnide    Inhalation Suspension 0.25 milliGRAM(s) Inhalation every 12 hours  chlorhexidine 0.12% Liquid 15 milliLiter(s) Oral Mucosa every 12 hours  chlorhexidine 4% Liquid 1 Application(s) Topical <User Schedule>  CRRT Treatment    <Continuous>  hydrocortisone sodium succinate Injectable 50 milliGRAM(s) IV Push every 6 hours  insulin lispro (ADMELOG) corrective regimen sliding scale   SubCutaneous every 6 hours  insulin NPH human recombinant 3 Unit(s) SubCutaneous every 6 hours  norepinephrine Infusion 0.85 MICROgram(s)/kG/Min (53.6 mL/Hr) IV Continuous <Continuous>  pantoprazole  Injectable 40 milliGRAM(s) IV Push daily  Phoxillum Filtration BK 4 / 2.5 5000 milliLiter(s) (200 mL/Hr) CRRT <Continuous>  piperacillin/tazobactam IVPB.. 3.375 Gram(s) IV Intermittent every 8 hours  PrismaSATE Dialysate BK 0 / 3.5 5000 milliLiter(s) (1500 mL/Hr) CRRT <Continuous>  PrismaSOL Filtration BGK 0 / 2.5 5000 milliLiter(s) (1300 mL/Hr) CRRT <Continuous>  propofol Infusion 15 MICROgram(s)/kG/Min (6.05 mL/Hr) IV Continuous <Continuous>  sodium bicarbonate  Infusion 0.2 mEq/kG/Hr (100 mL/Hr) IV Continuous <Continuous>  vancomycin  IVPB 1000 milliGRAM(s) IV Intermittent every 24 hours  vasopressin Infusion 0.04 Unit(s)/Min (6 mL/Hr) IV Continuous <Continuous>    MEDICATIONS  (PRN):  albuterol/ipratropium for Nebulization 3 milliLiter(s) Nebulizer every 6 hours PRN Shortness of Breath and/or Wheezing  sodium chloride 0.9% lock flush 10 milliLiter(s) IV Push every 1 hour PRN Pre/post blood products, medications, blood draw, and to maintain line patency    Allergies    No Known Allergies    Intolerances      REVIEW OF SYSTEMS: Pertinent positives and negatives as stated in HPI, otherwise negative    Vital signs  T(C): 37.4 (05-06-24 @ 12:00), Max: 37.9 (05-06-24 @ 04:00)  HR: 107 (05-06-24 @ 12:00)  BP: 133/67 (05-05-24 @ 14:45)  SpO2: 97% (05-06-24 @ 12:00)  Wt(kg): --    Physical Exam  Gen: Sedated   HEENT: normocephalic, atraumatic  Pulm: Intubated   Abd: Soft, NT, ND, no rebound tenderness or guarding  : Blanco catheter in place, draining   MSK:  No CVAT  NEURO: Sedated   SKIN: warm, dry, no rash.    LABS:  CBC                       9.9    31.23 )-----------( 37       ( 05 May 2024 23:09 )             29.1     BMP   05-06    134<L>  |  100  |  22  ----------------------------<  197<H>  4.8   |  13<L>  |  1.32<H>    Ca    7.8<L>      06 May 2024 05:11  Phos  3.4     05-06  Mg     2.3     05-06    TPro  5.1<L>  /  Alb  2.3<L>  /  TBili  5.1<H>  /  DBili  x   /  AST  78<H>  /  ALT  49<H>  /  AlkPhos  355<H>  05-06    PT/INR - ( 05 May 2024 23:08 )   PT: 20.4 sec;   INR: 1.89 ratio         PTT - ( 05 May 2024 15:52 )  PTT:34.8 sec    Urinalysis Basic - ( 06 May 2024 05:11 )    Color: x / Appearance: x / SG: x / pH: x  Gluc: 197 mg/dL / Ketone: x  / Bili: x / Urobili: x   Blood: x / Protein: x / Nitrite: x   Leuk Esterase: x / RBC: x / WBC x   Sq Epi: x / Non Sq Epi: x / Bacteria: x      Urine Cx: pending  Blood Cx: Culture - Blood (05.05.24 @ 09:35)   Gram Stain:   Growth in aerobic bottle: Gram Negative Rods and Gram Positive Cocci in   Clusters  - Escherichia coli: Detec  - Staphylococcus epidermidis, Methicillin resistant: Detec  Specimen Source: .Blood Blood-Peripheral  Organism: Blood Culture PCR  Culture Results:   Growth in aerobic bottle: Gram Negative Rods and Gram Positive Cocci in   Clusters   Direct identification is available within approximately 3-5   hours either by Blood Panel Multiplexed PCR or Direct   MALDI-TOF. Details: [email or web address]  Organism Identification: Blood Culture PCR  Method Type: PCR    Radiology:   < from: CT Abdomen and Pelvis w/ IV Cont (05.05.24 @ 10:22) >    ACC: [id number] EXAM:  CT ABDOMEN AND PELVIS IC   ORDERED BY: LEW HERNANDEZ     PROCEDURE DATE:  05/05/2024          INTERPRETATION:  CLINICAL INFORMATION: Slurred speech, septic shock    COMPARISON: None.    CONTRAST/COMPLICATIONS:  IV Contrast: Omnipaque 350  90 cc administered   10 cc discarded  Oral Contrast: NONE  Complications: None reported at time of study completion    PROCEDURE:  CT of the Abdomen and Pelvis was performed.  Sagittal and coronal reformats were performed.    FINDINGS:  LOWER CHEST: Scattered bilateral subsegmental atelectasis. Coronary   artery and aortic calcifications.. Mitral annular calcifications.    LIVER: Steatosis. Hepatomegaly. There is a 2.1 x 1.8 cm lesion with   peripheral hyperenhancement within the dome of the RIGHT lobe of the   liver (301-15). Ill-defined low density region anterior and superior to   this (301-13) is also indeterminate.  BILE DUCTS: Normal caliber.  GALLBLADDER: Cholecystectomy.  SPLEEN: Within normal limits.  PANCREAS: Within normal limits.  ADRENALS: Within normal limits.  KIDNEYS/URETERS: Mild right hydroureteronephrosis. No nephrolithiasis. No   left hydronephrosis.    BLADDER: Diffuse bladder wall thickening versus underdistention.  REPRODUCTIVE ORGANS: Uterus and adnexa within normal limits.    BOWEL: No bowel obstruction. Appendix is normal. There is diffuse   thickening of nearly the entirety of the colon with relative sparing of   the cecum and the rectum distal to the anastomosis. Findings are   consistent with a diffuse colitis.    PERITONEUM: No ascites.  VESSELS: Atherosclerotic changes.  RETROPERITONEUM/LYMPH NODES: There are prominent rounded slightly   hyperenhancing abnormal-appearing para-aortic lymph nodes. For example,   there is a rounded hyperenhancing node measuring 13 x 12 mm (301-57)   anterior to the aorta.  ABDOMINAL WALL: Small fat-containing umbilical hernia. Fat containing   RIGHT spigelian hernia.  BONES: Within normal limits.    IMPRESSION:  1.  Mild right hydroureteronephrosis with diffuse bladder wall thickening   versus underdistention. Correlate with urinalysis for possible ascending   urinary tract infection.  2.  Multiple ill-defined low density lesions within the RIGHT lobe of the   liver. Differential diagnosis includes metastases, infection/developing   abscess, or atypical masslike focal steatosis. MRI of the liver with   intravenous contrast is recommended for further evaluation.  3.  Diffuse nonspecific colitis.        --- End of Report ---      < end of copied text >   HPI:  73y Female with PMHx DM, HTN, HLD, asthma, CVA with residual L-sided weakness, orthostatic hypotension as per chart review presenting for fevers, chills, rigor, abdominal pain, nausea, and vomiting x 1 day duration admitted for sepsis 2/2 pyelonephritis vs colitis. Urology consulted for hydronephrosis.   Pt seen at bedside, intubated and sedated. History obtained from family at bedside.  Family denies h/o frequent UTI, urologic disorders, h/o seeing a urologist.     GI consulted for flexible sigmoidoscopy - healthy mucosa     PAST MEDICAL & SURGICAL HISTORY:  Diabetes      HTN (hypertension)      HLD (hyperlipidemia)      Asthma      CVA (cerebral vascular accident)      Orthostatic hypotension          FAMILY HISTORY:  No known  malignancy     Social History:  Lives at home by herself. Has health aide at home. Uses walker.   No substance use history (05 May 2024 13:10)  Denies alcohol and drug abuse, nonsmoker     MEDICATIONS  (STANDING):  buDESOnide    Inhalation Suspension 0.25 milliGRAM(s) Inhalation every 12 hours  chlorhexidine 0.12% Liquid 15 milliLiter(s) Oral Mucosa every 12 hours  chlorhexidine 4% Liquid 1 Application(s) Topical <User Schedule>  CRRT Treatment    <Continuous>  hydrocortisone sodium succinate Injectable 50 milliGRAM(s) IV Push every 6 hours  insulin lispro (ADMELOG) corrective regimen sliding scale   SubCutaneous every 6 hours  insulin NPH human recombinant 3 Unit(s) SubCutaneous every 6 hours  norepinephrine Infusion 0.85 MICROgram(s)/kG/Min (53.6 mL/Hr) IV Continuous <Continuous>  pantoprazole  Injectable 40 milliGRAM(s) IV Push daily  Phoxillum Filtration BK 4 / 2.5 5000 milliLiter(s) (200 mL/Hr) CRRT <Continuous>  piperacillin/tazobactam IVPB.. 3.375 Gram(s) IV Intermittent every 8 hours  PrismaSATE Dialysate BK 0 / 3.5 5000 milliLiter(s) (1500 mL/Hr) CRRT <Continuous>  PrismaSOL Filtration BGK 0 / 2.5 5000 milliLiter(s) (1300 mL/Hr) CRRT <Continuous>  propofol Infusion 15 MICROgram(s)/kG/Min (6.05 mL/Hr) IV Continuous <Continuous>  sodium bicarbonate  Infusion 0.2 mEq/kG/Hr (100 mL/Hr) IV Continuous <Continuous>  vancomycin  IVPB 1000 milliGRAM(s) IV Intermittent every 24 hours  vasopressin Infusion 0.04 Unit(s)/Min (6 mL/Hr) IV Continuous <Continuous>    MEDICATIONS  (PRN):  albuterol/ipratropium for Nebulization 3 milliLiter(s) Nebulizer every 6 hours PRN Shortness of Breath and/or Wheezing  sodium chloride 0.9% lock flush 10 milliLiter(s) IV Push every 1 hour PRN Pre/post blood products, medications, blood draw, and to maintain line patency    Allergies    No Known Allergies    Intolerances      REVIEW OF SYSTEMS: Pertinent positives and negatives as stated in HPI, otherwise negative    Vital signs  T(C): 37.4 (05-06-24 @ 12:00), Max: 37.9 (05-06-24 @ 04:00)  HR: 107 (05-06-24 @ 12:00)  BP: 133/67 (05-05-24 @ 14:45)  SpO2: 97% (05-06-24 @ 12:00)  Wt(kg): --    Physical Exam  Gen: Sedated   HEENT: normocephalic, atraumatic  Pulm: Intubated   Abd: Soft, NT, ND, no rebound tenderness or guarding  : Blanco catheter in place, draining   MSK:  No CVAT  NEURO: Sedated   SKIN: warm, dry, no rash.    LABS:  CBC                       9.9    31.23 )-----------( 37       ( 05 May 2024 23:09 )             29.1     BMP   05-06    134<L>  |  100  |  22  ----------------------------<  197<H>  4.8   |  13<L>  |  1.32<H>    Ca    7.8<L>      06 May 2024 05:11  Phos  3.4     05-06  Mg     2.3     05-06    TPro  5.1<L>  /  Alb  2.3<L>  /  TBili  5.1<H>  /  DBili  x   /  AST  78<H>  /  ALT  49<H>  /  AlkPhos  355<H>  05-06    PT/INR - ( 05 May 2024 23:08 )   PT: 20.4 sec;   INR: 1.89 ratio         PTT - ( 05 May 2024 15:52 )  PTT:34.8 sec    Urinalysis Basic - ( 06 May 2024 05:11 )    Color: x / Appearance: x / SG: x / pH: x  Gluc: 197 mg/dL / Ketone: x  / Bili: x / Urobili: x   Blood: x / Protein: x / Nitrite: x   Leuk Esterase: x / RBC: x / WBC x   Sq Epi: x / Non Sq Epi: x / Bacteria: x      Urine Cx: pending  Blood Cx: Culture - Blood (05.05.24 @ 09:35)   Gram Stain:   Growth in aerobic bottle: Gram Negative Rods and Gram Positive Cocci in   Clusters  - Escherichia coli: Detec  - Staphylococcus epidermidis, Methicillin resistant: Detec  Specimen Source: .Blood Blood-Peripheral  Organism: Blood Culture PCR  Culture Results:   Growth in aerobic bottle: Gram Negative Rods and Gram Positive Cocci in   Clusters   Direct identification is available within approximately 3-5   hours either by Blood Panel Multiplexed PCR or Direct   MALDI-TOF. Details: [email or web address]  Organism Identification: Blood Culture PCR  Method Type: PCR    Radiology:   < from: CT Abdomen and Pelvis w/ IV Cont (05.05.24 @ 10:22) >    ACC: [id number] EXAM:  CT ABDOMEN AND PELVIS IC   ORDERED BY: LEW HERNANDEZ     PROCEDURE DATE:  05/05/2024          INTERPRETATION:  CLINICAL INFORMATION: Slurred speech, septic shock    COMPARISON: None.    CONTRAST/COMPLICATIONS:  IV Contrast: Omnipaque 350  90 cc administered   10 cc discarded  Oral Contrast: NONE  Complications: None reported at time of study completion    PROCEDURE:  CT of the Abdomen and Pelvis was performed.  Sagittal and coronal reformats were performed.    FINDINGS:  LOWER CHEST: Scattered bilateral subsegmental atelectasis. Coronary   artery and aortic calcifications.. Mitral annular calcifications.    LIVER: Steatosis. Hepatomegaly. There is a 2.1 x 1.8 cm lesion with   peripheral hyperenhancement within the dome of the RIGHT lobe of the   liver (301-15). Ill-defined low density region anterior and superior to   this (301-13) is also indeterminate.  BILE DUCTS: Normal caliber.  GALLBLADDER: Cholecystectomy.  SPLEEN: Within normal limits.  PANCREAS: Within normal limits.  ADRENALS: Within normal limits.  KIDNEYS/URETERS: Mild right hydroureteronephrosis. No nephrolithiasis. No   left hydronephrosis.    BLADDER: Diffuse bladder wall thickening versus underdistention.  REPRODUCTIVE ORGANS: Uterus and adnexa within normal limits.    BOWEL: No bowel obstruction. Appendix is normal. There is diffuse   thickening of nearly the entirety of the colon with relative sparing of   the cecum and the rectum distal to the anastomosis. Findings are   consistent with a diffuse colitis.    PERITONEUM: No ascites.  VESSELS: Atherosclerotic changes.  RETROPERITONEUM/LYMPH NODES: There are prominent rounded slightly   hyperenhancing abnormal-appearing para-aortic lymph nodes. For example,   there is a rounded hyperenhancing node measuring 13 x 12 mm (301-57)   anterior to the aorta.  ABDOMINAL WALL: Small fat-containing umbilical hernia. Fat containing   RIGHT spigelian hernia.  BONES: Within normal limits.    IMPRESSION:  1.  Mild right hydroureteronephrosis with diffuse bladder wall thickening   versus underdistention. Correlate with urinalysis for possible ascending   urinary tract infection.  2.  Multiple ill-defined low density lesions within the RIGHT lobe of the   liver. Differential diagnosis includes metastases, infection/developing   abscess, or atypical masslike focal steatosis. MRI of the liver with   intravenous contrast is recommended for further evaluation.  3.  Diffuse nonspecific colitis.        --- End of Report ---      < end of copied text >

## 2024-05-06 NOTE — CONSULT NOTE ADULT - CONSULT REQUESTED DATE/TIME
05-May-2024 13:08
05-May-2024 16:40
06-May-2024 13:11
06-May-2024 12:29
05-May-2024 20:52
06-May-2024 17:21
06-May-2024 13:43

## 2024-05-06 NOTE — CONSULT NOTE ADULT - SUBJECTIVE AND OBJECTIVE BOX
Patient is a 73y old  Female who presents with a chief complaint of fevers, abdominal pain and vomiting    HPI:  72yo female, pmh HTN, T2DM on ozempic/insulin, CVA history w residual L-sided weakness, orthostatic hypotension, asthma presenting for fevers, chills, rigor, abdominal pain, nausea, and vomiting x 1 day duration. Patient had one episode of non-bloody/non-bilious emesis, was brought in by EMS when patient was found on floor by her health aide. Patient also reports recurrent falls recently, had a fall the day before admission where EMS was called, patient was evaluated. Patient had another fall again during the day of [facility], reported feeling dizzy prior to the fall. In ED, patient vitals was found to be hypotensive and tachycardic. Labs were significant for WBC 31.34 with 17% BANDS which trended up to 50.47 and now 31.23, PLTs 60 now 37, Alk phos 2828 to 328 to 583 now 378, BUN/Cr 38/2.97 to 34/2.3 now 15/0.93, Bilirubin 3.6 to 3.9 to 5.1 now 4.7, AST 53 now 139, ALT 32 now 56, , haptoglobin 170, Procal 45.31, , lactate 7.7 to 9.76 now 13.4 and UA with large leukocyte esterase, >998 WBC and TNTC bacteria. Imaging obtained was a CT abd/pelv w/ IV contrast showing 2.1 x 1.8 cm lesion with peripheral hyperenhancement within the dome of the RIGHT lobe of the liver with other hypodense regions, mild R hydro with bladder wall thickening, and nonspecific colitis, CT head negative for acute findings and CXR showing no consolidation Now blood culture from 5/5 growing 1/2 bottles of E.coli and MRSE.    REVIEW OF SYSTEMS  pending full examination    prior hospital charts reviewed [V]  primary team notes reviewed [V]  other consultant notes reviewed [V]    PAST MEDICAL & SURGICAL HISTORY:  Diabetes      HTN (hypertension)      HLD (hyperlipidemia)      Asthma      CVA (cerebral vascular accident)      Orthostatic hypotension          SOCIAL HISTORY:  Denied smoking/vaping/alcohol/recreational drug use    FAMILY HISTORY:      Allergies  No Known Allergies        ANTIMICROBIALS:  piperacillin/tazobactam IVPB.. 3.375 every 8 hours  vancomycin  IVPB 1000 every 24 hours      ANTIMICROBIALS (past 90 days):  MEDICATIONS  (STANDING):  metroNIDAZOLE  IVPB   100 mL/Hr IV Intermittent (05-05-24 @ 15:20)    metroNIDAZOLE  IVPB   100 mL/Hr IV Intermittent (05-05-24 @ 21:50)    piperacillin/tazobactam IVPB..   25 mL/Hr IV Intermittent (05-05-24 @ 17:52)    piperacillin/tazobactam IVPB..   25 mL/Hr IV Intermittent (05-06-24 @ 13:05)   25 mL/Hr IV Intermittent (05-06-24 @ 05:28)   25 mL/Hr IV Intermittent (05-05-24 @ 21:50)    piperacillin/tazobactam IVPB...   200 mL/Hr IV Intermittent (05-05-24 @ 09:30)    vancomycin    Solution   500 milliGRAM(s) Oral (05-05-24 @ 17:52)    vancomycin  IVPB   250 mL/Hr IV Intermittent (05-06-24 @ 11:05)    vancomycin  IVPB.   250 mL/Hr IV Intermittent (05-05-24 @ 11:52)        OTHER MEDS:   MEDICATIONS  (STANDING):  albuterol/ipratropium for Nebulization 3 every 6 hours PRN  buDESOnide    Inhalation Suspension 0.25 every 12 hours  hydrocortisone sodium succinate Injectable 50 every 6 hours  insulin lispro (ADMELOG) corrective regimen sliding scale  every 6 hours  insulin NPH human recombinant 3 every 6 hours  norepinephrine Infusion 0.85 <Continuous>  pantoprazole  Injectable 40 daily  propofol Infusion 15 <Continuous>  vasopressin Infusion 0.04 <Continuous>      VITALS:  Vital Signs Last 24 Hrs  T(F): 99.3 (05-06-24 @ 12:00), Max: 100.2 (05-06-24 @ 04:00)    Vital Signs Last 24 Hrs  HR: 103 (05-06-24 @ 13:00) (101 - 125)  BP: 133/67 (05-05-24 @ 14:45) (109/53 - 133/67)  RR: 31 (05-06-24 @ 13:00)  SpO2: 98% (05-06-24 @ 13:00) (89% - 100%)  Wt(kg): --    EXAM:  pending full examination      Labs:                        9.9    31.23 )-----------( 37       ( 05 May 2024 23:09 )             29.1     05-06    131<L>  |  93<L>  |  15  ----------------------------<  175<H>  5.2   |  11<L>  |  0.93    Ca    7.7<L>      06 May 2024 12:19  Phos  4.6     05-06  Mg     2.2     05-06    TPro  4.6<L>  /  Alb  2.2<L>  /  TBili  4.7<H>  /  DBili  x   /  AST  139<H>  /  ALT  56<H>  /  AlkPhos  378<H>  05-06      WBC Trend:  WBC Count: 31.23 (05-05-24 @ 23:09)  WBC Count: 5.05 (05-05-24 @ 16:11)  WBC Count: 50.44 (05-05-24 @ 11:52)  WBC Count: 31.34 (05-05-24 @ 10:00)      Auto Neutrophil #: 28.45 K/uL (05-05-24 @ 23:09)  Auto Neutrophil #: 2.68 K/uL (05-05-24 @ 16:11)  Band Neutrophils %: 12.4 % (05-05-24 @ 16:11)  Auto Neutrophil #: 24.23 K/uL (05-05-24 @ 10:00)  Band Neutrophils %: 17.6 % (05-05-24 @ 10:00)      Creatine Trend:  Creatinine: 0.93 (05-06)  Creatinine: 1.32 (05-06)  Creatinine: 2.30 (05-05)  Creatinine: 2.57 (05-05)      Liver Biochemical Testing Trend:  Alanine Aminotransferase (ALT/SGPT): 56 *H* (05-06)  Alanine Aminotransferase (ALT/SGPT): 49 *H* (05-06)  Alanine Aminotransferase (ALT/SGPT): 32 (05-05)  Alanine Aminotransferase (ALT/SGPT): 34 (05-05)  Alanine Aminotransferase (ALT/SGPT): 37 (05-05)  Aspartate Aminotransferase (AST/SGOT): 139 (05-06-24 @ 12:19)  Aspartate Aminotransferase (AST/SGOT): 78 (05-06-24 @ 05:11)  Aspartate Aminotransferase (AST/SGOT): 54 (05-05-24 @ 16:08)  Aspartate Aminotransferase (AST/SGOT): 54 (05-05-24 @ 13:54)  Aspartate Aminotransferase (AST/SGOT): 66 (05-05-24 @ 11:52)  Bilirubin Total: 4.7 (05-06)  Bilirubin Total: 5.1 (05-06)  Bilirubin Total: 3.7 (05-05)  Bilirubin Total: 3.9 (05-05)  Bilirubin Total: 3.8 (05-05)      Trend LDH  05-06-24 @ 05:11  402<H>  05-05-24 @ 11:52  425<H>      Auto Eosinophil %: 2.9 % (05-05-24 @ 23:09)  Auto Eosinophil %: 0.9 % (05-05-24 @ 16:11)  Auto Eosinophil %: 1.7 % (05-05-24 @ 10:00)    MICROBIOLOGY:  Culture - Blood (collected 05 May 2024 09:35)  Source: .Blood Blood-Peripheral  Preliminary Report:    Growth in aerobic bottle: Gram Negative Rods and Gram Positive Cocci in    Clusters    Direct identification is available within approximately 3-5    hours either by Blood Panel Multiplexed PCR or Direct    MALDI-TOF. Details: [email or web address]  Organism: Blood Culture PCR  Organism: Blood Culture PCR    Sensitivities:      Method Type: PCR      -  Escherichia coli: Detec      -  Staphylococcus epidermidis, Methicillin resistant: Detec    Procalcitonin: 45.41 (05-05)    D-Dimer Assay, Quantitative: 9956 (05-05)  D-Dimer Assay, Quantitative: 36403 (05-05)  D-Dimer Assay, Quantitative: 45908 (05-05)    Lactate Dehydrogenase, Serum: 402 (05-06)  Lactate Dehydrogenase, Serum: 425 (05-05)      Troponin T, High Sensitivity Result: 212 (05-06)  Troponin T, High Sensitivity Result: 164 (05-05)  Troponin T, High Sensitivity Result: 85 (05-05)  Troponin T, High Sensitivity Result: 71 (05-05)  Troponin T, High Sensitivity Result: 68 (05-05)    Blood Gas Arterial, Lactate: 13.4 (05-06 @ 12:06)  Blood Gas Arterial, Lactate: 12.5 (05-06 @ 09:49)  Blood Gas Venous - Lactate: 8.3 (05-06 @ 04:55)  Blood Gas Arterial, Lactate: 8.0 (05-06 @ 04:55)    A1C with Estimated Average Glucose Result: 7.6 % (05-05-24 @ 23:08)    RADIOLOGY:  < from: CT Abdomen and Pelvis w/ IV Cont (05.05.24 @ 10:22) >  IMPRESSION:  1.  Mild right hydroureteronephrosis with diffuse bladder wall thickening   versus underdistention. Correlate with urinalysis for possible ascending   urinary tract infection.  2.  Multiple ill-defined low density lesions within the RIGHT lobe of the   liver. Steatosis. Hepatomegaly. There is a 2.1 x 1.8 cm lesion with   peripheral hyperenhancement within the dome of the RIGHT lobe of the   liver (301-15). Ill-defined low density region anterior and superior to   this (301-13) is also indeterminate. Differential diagnosis includes metastases, infection/developing   abscess, or atypical masslike focal steatosis. MRI of the liver with   intravenous contrast is recommended for further evaluation.  3.  Diffuse nonspecific colitis.    < from: CT Head No Cont (05.05.24 @ 10:22) >  IMPRESSION:  No acute intracranial hemorrhage or acute territorial infarct.  If   symptoms persist, follow-up MRI exam recommended.    < from: Xray Chest 1 View- PORTABLE-Urgent (05.05.24 @ 13:34) >  IMPRESSION:  No focal consolidations.         Patient is a 73y old  Female who presents with a chief complaint of fevers, abdominal pain and vomiting    HPI:  74yo female, pmh HTN, T2DM on ozempic/insulin, CVA history w residual L-sided weakness, orthostatic hypotension, asthma presenting for fevers, chills, rigor, abdominal pain, nausea, and vomiting x 1 day duration. Patient had one episode of non-bloody/non-bilious emesis, was brought in by EMS when patient was found on floor by her health aide. Patient also reports recurrent falls recently, had a fall the day before admission where EMS was called, patient was evaluated. [facility] feeling [facility]zy prior to the fall. In ED, patient vitals was found to be hypotensive and tachycardic. Labs were significant for WBC 31.34 with 17% BANDS which trended up to 50.47 and now 31.23, PLTs 60 now 37, Alk phos 2828 to 328 to 583 now 378, BUN/Cr 38/2.97 to 34/2.3 now 15/0.93, Bilirubin 3.6 to 3.9 to 5.1 now 4.7, AST 53 now 139, ALT 32 now 56, , haptoglobin 170, Procal 45.31, , lactate 7.7 to 9.76 now 13.4 and UA with large leukocyte esterase, >998 WBC and TNTC bacteria. Imaging obtained was a CT abd/pelv w/ IV contrast showing 2.1 x 1.8 cm lesion with peripheral hyperenhancement within the dome of the RIGHT lobe of the liver with other hypodense regions, mild R hydro with bladder wall thickening, and nonspecific colitis, CT head negative for acute findings and CXR showing no consolidation Now blood culture from 5/5 growing 1/2 bottles of E.coli and MRSE. Unable to obtain further HPI patient intubated.    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 3.375 every 8 hours  vancomycin  IVPB 1000 every 24 hours      ANTIMICROBIALS (past 90 days):  MEDICATIONS  (STANDING):  metroNIDAZOLE  IVPB   100 mL/Hr IV Intermittent (05-05-24 @ 15:20)    metroNIDAZOLE  IVPB   100 mL/Hr IV Intermittent (05-05-24 @ 21:50)    piperacillin/tazobactam IVPB..   25 mL/Hr IV Intermittent (05-05-24 @ 17:52)    piperacillin/tazobactam IVPB..   25 mL/Hr IV Intermittent (05-06-24 @ 13:05)   25 mL/Hr IV Intermittent (05-06-24 @ 05:28)   25 mL/Hr IV Intermittent (05-05-24 @ 21:50)    piperacillin/tazobactam IVPB...   200 mL/Hr IV Intermittent (05-05-24 @ 09:30)    vancomycin    Solution   500 milliGRAM(s) Oral (05-05-24 @ 17:52)    vancomycin  IVPB   250 mL/Hr IV Intermittent (05-06-24 @ 11:05)    vancomycin  IVPB.   250 mL/Hr IV Intermittent (05-05-24 @ 11:52)        OTHER MEDS:   MEDICATIONS  (STANDING):  albuterol/ipratropium for Nebulization 3 every 6 hours PRN  buDESOnide    Inhalation Suspension 0.25 every 12 hours  hydrocortisone sodium succinate Injectable 50 every 6 hours  insulin lispro (ADMELOG) corrective regimen sliding scale  every 6 hours  insulin NPH human recombinant 3 every 6 hours  norepinephrine Infusion 0.85 <Continuous>  pantoprazole  Injectable 40 daily  propofol Infusion 15 <Continuous>  vasopressin Infusion 0.04 <Continuous>      VITALS:  Vital Signs Last 24 Hrs  T(F): 99.3 (05-06-24 @ 12:00), Max: 100.2 (05-06-24 @ 04:00)    Vital Signs Last 24 Hrs  HR: 103 (05-06-24 @ 13:00) (101 - 125)  BP: 133/67 (05-05-24 @ 14:45) (109/53 - 133/67)  RR: 31 (05-06-24 @ 13:00)  SpO2: 98% (05-06-24 @ 13:00) (89% - 100%)  Wt(kg): --    EXAM:  General: Patient appears comfortable  HEENT: NCAT, PERRL but lethargic, slightly icteric sclera, ET tube in place,   Neck: RIJ CVC, LIJ dialysis catheter  CV: +S1/S2, RRR, no M/R/G  Lungs: No respiratory distress, CTA b/l, no wheezing, rales or rhonchi  Abd:  BS4+, Soft, ND  : altman catheter  Neuro: unable to assess  Ext: slight edema of upper and lower extremities   Msk: freely moving upper and lower extremities  Skin: dark rash/discoloration of abdomen and b/l, No erythema       Labs:                        9.9    31.23 )-----------( 37       ( 05 May 2024 23:09 )             29.1     05-06    131<L>  |  93<L>  |  15  ----------------------------<  175<H>  5.2   |  11<L>  |  0.93    Ca    7.7<L>      06 May 2024 12:19  Phos  4.6     05-06  Mg     2.2     05-06    TPro  4.6<L>  /  Alb  2.2<L>  /  TBili  4.7<H>  /  DBili  x   /  AST  139<H>  /  ALT  56<H>  /  AlkPhos  378<H>  05-06      WBC Trend:  WBC Count: 31.23 (05-05-24 @ 23:09)  WBC Count: 5.05 (05-05-24 @ 16:11)  WBC Count: 50.44 (05-05-24 @ 11:52)  WBC Count: 31.34 (05-05-24 @ 10:00)      Auto Neutrophil #: 28.45 K/uL (05-05-24 @ 23:09)  Auto Neutrophil #: 2.68 K/uL (05-05-24 @ 16:11)  Band Neutrophils %: 12.4 % (05-05-24 @ 16:11)  Auto Neutrophil #: 24.23 K/uL (05-05-24 @ 10:00)  Band Neutrophils %: 17.6 % (05-05-24 @ 10:00)      Creatine Trend:  Creatinine: 0.93 (05-06)  Creatinine: 1.32 (05-06)  Creatinine: 2.30 (05-05)  Creatinine: 2.57 (05-05)      Liver Biochemical Testing Trend:  Alanine Aminotransferase (ALT/SGPT): 56 *H* (05-06)  Alanine Aminotransferase (ALT/SGPT): 49 *H* (05-06)  Alanine Aminotransferase (ALT/SGPT): 32 (05-05)  Alanine Aminotransferase (ALT/SGPT): 34 (05-05)  Alanine Aminotransferase (ALT/SGPT): 37 (05-05)  Aspartate Aminotransferase (AST/SGOT): 139 (05-06-24 @ 12:19)  Aspartate Aminotransferase (AST/SGOT): 78 (05-06-24 @ 05:11)  Aspartate Aminotransferase (AST/SGOT): 54 (05-05-24 @ 16:08)  Aspartate Aminotransferase (AST/SGOT): 54 (05-05-24 @ 13:54)  Aspartate Aminotransferase (AST/SGOT): 66 (05-05-24 @ 11:52)  Bilirubin Total: 4.7 (05-06)  Bilirubin Total: 5.1 (05-06)  Bilirubin Total: 3.7 (05-05)  Bilirubin Total: 3.9 (05-05)  Bilirubin Total: 3.8 (05-05)      Trend LDH  05-06-24 @ 05:11  402<H>  05-05-24 @ 11:52  425<H>      Auto Eosinophil %: 2.9 % (05-05-24 @ 23:09)  Auto Eosinophil %: 0.9 % (05-05-24 @ 16:11)  Auto Eosinophil %: 1.7 % (05-05-24 @ 10:00)    MICROBIOLOGY:  Culture - Blood (collected 05 May 2024 09:35)  Source: .Blood Blood-Peripheral  Preliminary Report:    Growth in aerobic bottle: Gram Negative Rods and Gram Positive Cocci in    Clusters    Direct identification is available within approximately 3-5    hours either by Blood Panel Multiplexed PCR or Direct    MALDI-TOF. Details: [email or web address]  Organism: Blood Culture PCR  Organism: Blood Culture PCR    Sensitivities:      Method Type: PCR      -  Escherichia coli: Detec      -  Staphylococcus epidermidis, Methicillin resistant: Detec    Procalcitonin: 45.41 (05-05)    D-Dimer Assay, Quantitative: 9956 (05-05)  D-Dimer Assay, Quantitative: 48555 (05-05)  D-Dimer Assay, Quantitative: 26529 (05-05)    Lactate Dehydrogenase, Serum: 402 (05-06)  Lactate Dehydrogenase, Serum: 425 (05-05)      Troponin T, High Sensitivity Result: 212 (05-06)  Troponin T, High Sensitivity Result: 164 (05-05)  Troponin T, High Sensitivity Result: 85 (05-05)  Troponin T, High Sensitivity Result: 71 (05-05)  Troponin T, High Sensitivity Result: 68 (05-05)    Blood Gas Arterial, Lactate: 13.4 (05-06 @ 12:06)  Blood Gas Arterial, Lactate: 12.5 (05-06 @ 09:49)  Blood Gas Venous - Lactate: 8.3 (05-06 @ 04:55)  Blood Gas Arterial, Lactate: 8.0 (05-06 @ 04:55)    A1C with Estimated Average Glucose Result: 7.6 % (05-05-24 @ 23:08)    RADIOLOGY:  < from: CT Abdomen and Pelvis w/ IV Cont (05.05.24 @ 10:22) >  IMPRESSION:  1.  Mild right hydroureteronephrosis with diffuse bladder wall thickening   versus underdistention. Correlate with urinalysis for possible ascending   urinary tract infection.  2.  Multiple ill-defined low density lesions within the RIGHT lobe of the   liver. Steatosis. Hepatomegaly. There is a 2.1 x 1.8 cm lesion with   peripheral hyperenhancement within the dome of the RIGHT lobe of the   liver (301-15). Ill-defined low density region anterior and superior to   this (301-13) is also indeterminate. Differential diagnosis includes metastases, infection/developing   abscess, or atypical masslike focal steatosis. MRI of the liver with   intravenous contrast is recommended for further evaluation.  3.  Diffuse nonspecific colitis.    < from: CT Head No Cont (05.05.24 @ 10:22) >  IMPRESSION:  No acute intracranial hemorrhage or acute territorial infarct.  If   symptoms persist, follow-up MRI exam recommended.    < from: Xray Chest 1 View- PORTABLE-Urgent (05.05.24 @ 13:34) >  IMPRESSION:  No focal consolidations.         Patient is a 73y old  Female who presents with a chief complaint of fevers, abdominal pain and vomiting    HPI:  72yo female, pmh HTN, T2DM on ozempic/insulin, CVA history w residual L-sided weakness, orthostatic hypotension, asthma presenting for fevers, chills, rigor, abdominal pain, nausea, and vomiting x 1 day duration. Patient had one episode of non-bloody/non-bilious emesis, was brought in by EMS when patient was found on floor by her health aide. Patient also reports recurrent falls recently, had a fall the day before admission where EMS was called, patient was evaluated. [facility]zy prior to the fall. In ED, patient vitals was found to be hypotensive and tachycardic. Labs were significant for WBC 31.34 with 17% BANDS which trended up to 50.47 and now 31.23, PLTs 60 now 37, Alk phos 2828 to 328 to 583 now 378, BUN/Cr 38/2.97 to 34/2.3 now 15/0.93, Bilirubin 3.6 to 3.9 to 5.1 now 4.7, AST 53 now 139, ALT 32 now 56, , haptoglobin 170, Procal 45.31, , lactate 7.7 to 9.76 now 13.4 and UA with large leukocyte esterase, >998 WBC and TNTC bacteria. Imaging obtained was a CT abd/pelv w/ IV contrast showing 2.1 x 1.8 cm lesion with peripheral hyperenhancement within the dome of the RIGHT lobe of the liver with other hypodense regions, mild R hydro with bladder wall thickening, and nonspecific colitis, CT head negative for acute findings and CXR showing no consolidation Now blood culture from 5/5 growing 1/2 bottles of E.coli and MRSE. Unable to obtain further HPI patient intubated.    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 3.375 every 8 hours  vancomycin  IVPB 1000 every 24 hours      ANTIMICROBIALS (past 90 days):  MEDICATIONS  (STANDING):  metroNIDAZOLE  IVPB   100 mL/Hr IV Intermittent (05-05-24 @ 15:20)    metroNIDAZOLE  IVPB   100 mL/Hr IV Intermittent (05-05-24 @ 21:50)    piperacillin/tazobactam IVPB..   25 mL/Hr IV Intermittent (05-05-24 @ 17:52)    piperacillin/tazobactam IVPB..   25 mL/Hr IV Intermittent (05-06-24 @ 13:05)   25 mL/Hr IV Intermittent (05-06-24 @ 05:28)   25 mL/Hr IV Intermittent (05-05-24 @ 21:50)    piperacillin/tazobactam IVPB...   200 mL/Hr IV Intermittent (05-05-24 @ 09:30)    vancomycin    Solution   500 milliGRAM(s) Oral (05-05-24 @ 17:52)    vancomycin  IVPB   250 mL/Hr IV Intermittent (05-06-24 @ 11:05)    vancomycin  IVPB.   250 mL/Hr IV Intermittent (05-05-24 @ 11:52)        OTHER MEDS:   MEDICATIONS  (STANDING):  albuterol/ipratropium for Nebulization 3 every 6 hours PRN  buDESOnide    Inhalation Suspension 0.25 every 12 hours  hydrocortisone sodium succinate Injectable 50 every 6 hours  insulin lispro (ADMELOG) corrective regimen sliding scale  every 6 hours  insulin NPH human recombinant 3 every 6 hours  norepinephrine Infusion 0.85 <Continuous>  pantoprazole  Injectable 40 daily  propofol Infusion 15 <Continuous>  vasopressin Infusion 0.04 <Continuous>      VITALS:  Vital Signs Last 24 Hrs  T(F): 99.3 (05-06-24 @ 12:00), Max: 100.2 (05-06-24 @ 04:00)    Vital Signs Last 24 Hrs  HR: 103 (05-06-24 @ 13:00) (101 - 125)  BP: 133/67 (05-05-24 @ 14:45) (109/53 - 133/67)  RR: 31 (05-06-24 @ 13:00)  SpO2: 98% (05-06-24 @ 13:00) (89% - 100%)  Wt(kg): --    EXAM:  General: Patient appears comfortable  HEENT: NCAT, PERRL but lethargic, slightly icteric sclera, ET tube in place,   Neck: RIJ CVC, LIJ dialysis catheter  CV: +S1/S2, RRR, no M/R/G  Lungs: No respiratory distress, CTA b/l, no wheezing, rales or rhonchi  Abd:  BS4+, Soft, ND  : altman catheter  Neuro: unable to assess  Ext: slight edema of upper and lower extremities   Msk: freely moving upper and lower extremities  Skin: dark rash/discoloration of abdomen and b/l, No erythema       Labs:                        9.9    31.23 )-----------( 37       ( 05 May 2024 23:09 )             29.1     05-06    131<L>  |  93<L>  |  15  ----------------------------<  175<H>  5.2   |  11<L>  |  0.93    Ca    7.7<L>      06 May 2024 12:19  Phos  4.6     05-06  Mg     2.2     05-06    TPro  4.6<L>  /  Alb  2.2<L>  /  TBili  4.7<H>  /  DBili  x   /  AST  139<H>  /  ALT  56<H>  /  AlkPhos  378<H>  05-06      WBC Trend:  WBC Count: 31.23 (05-05-24 @ 23:09)  WBC Count: 5.05 (05-05-24 @ 16:11)  WBC Count: 50.44 (05-05-24 @ 11:52)  WBC Count: 31.34 (05-05-24 @ 10:00)      Auto Neutrophil #: 28.45 K/uL (05-05-24 @ 23:09)  Auto Neutrophil #: 2.68 K/uL (05-05-24 @ 16:11)  Band Neutrophils %: 12.4 % (05-05-24 @ 16:11)  Auto Neutrophil #: 24.23 K/uL (05-05-24 @ 10:00)  Band Neutrophils %: 17.6 % (05-05-24 @ 10:00)      Creatine Trend:  Creatinine: 0.93 (05-06)  Creatinine: 1.32 (05-06)  Creatinine: 2.30 (05-05)  Creatinine: 2.57 (05-05)      Liver Biochemical Testing Trend:  Alanine Aminotransferase (ALT/SGPT): 56 *H* (05-06)  Alanine Aminotransferase (ALT/SGPT): 49 *H* (05-06)  Alanine Aminotransferase (ALT/SGPT): 32 (05-05)  Alanine Aminotransferase (ALT/SGPT): 34 (05-05)  Alanine Aminotransferase (ALT/SGPT): 37 (05-05)  Aspartate Aminotransferase (AST/SGOT): 139 (05-06-24 @ 12:19)  Aspartate Aminotransferase (AST/SGOT): 78 (05-06-24 @ 05:11)  Aspartate Aminotransferase (AST/SGOT): 54 (05-05-24 @ 16:08)  Aspartate Aminotransferase (AST/SGOT): 54 (05-05-24 @ 13:54)  Aspartate Aminotransferase (AST/SGOT): 66 (05-05-24 @ 11:52)  Bilirubin Total: 4.7 (05-06)  Bilirubin Total: 5.1 (05-06)  Bilirubin Total: 3.7 (05-05)  Bilirubin Total: 3.9 (05-05)  Bilirubin Total: 3.8 (05-05)      Trend LDH  05-06-24 @ 05:11  402<H>  05-05-24 @ 11:52  425<H>      Auto Eosinophil %: 2.9 % (05-05-24 @ 23:09)  Auto Eosinophil %: 0.9 % (05-05-24 @ 16:11)  Auto Eosinophil %: 1.7 % (05-05-24 @ 10:00)    MICROBIOLOGY:  Culture - Blood (collected 05 May 2024 09:35)  Source: .Blood Blood-Peripheral  Preliminary Report:    Growth in aerobic bottle: Gram Negative Rods and Gram Positive Cocci in    Clusters    Direct identification is available within approximately 3-5    hours either by Blood Panel Multiplexed PCR or Direct    MALDI-TOF. Details: [email or web address]  Organism: Blood Culture PCR  Organism: Blood Culture PCR    Sensitivities:      Method Type: PCR      -  Escherichia coli: Detec      -  Staphylococcus epidermidis, Methicillin resistant: Detec    Procalcitonin: 45.41 (05-05)    D-Dimer Assay, Quantitative: 9956 (05-05)  D-Dimer Assay, Quantitative: 88843 (05-05)  D-Dimer Assay, Quantitative: 42838 (05-05)    Lactate Dehydrogenase, Serum: 402 (05-06)  Lactate Dehydrogenase, Serum: 425 (05-05)      Troponin T, High Sensitivity Result: 212 (05-06)  Troponin T, High Sensitivity Result: 164 (05-05)  Troponin T, High Sensitivity Result: 85 (05-05)  Troponin T, High Sensitivity Result: 71 (05-05)  Troponin T, High Sensitivity Result: 68 (05-05)    Blood Gas Arterial, Lactate: 13.4 (05-06 @ 12:06)  Blood Gas Arterial, Lactate: 12.5 (05-06 @ 09:49)  Blood Gas Venous - Lactate: 8.3 (05-06 @ 04:55)  Blood Gas Arterial, Lactate: 8.0 (05-06 @ 04:55)    A1C with Estimated Average Glucose Result: 7.6 % (05-05-24 @ 23:08)    RADIOLOGY:  < from: CT Abdomen and Pelvis w/ IV Cont (05.05.24 @ 10:22) >  IMPRESSION:  1.  Mild right hydroureteronephrosis with diffuse bladder wall thickening   versus underdistention. Correlate with urinalysis for possible ascending   urinary tract infection.  2.  Multiple ill-defined low density lesions within the RIGHT lobe of the   liver. Steatosis. Hepatomegaly. There is a 2.1 x 1.8 cm lesion with   peripheral hyperenhancement within the dome of the RIGHT lobe of the   liver (301-15). Ill-defined low density region anterior and superior to   this (301-13) is also indeterminate. Differential diagnosis includes metastases, infection/developing   abscess, or atypical masslike focal steatosis. MRI of the liver with   intravenous contrast is recommended for further evaluation.  3.  Diffuse nonspecific colitis.    < from: CT Head No Cont (05.05.24 @ 10:22) >  IMPRESSION:  No acute intracranial hemorrhage or acute territorial infarct.  If   symptoms persist, follow-up MRI exam recommended.    < from: Xray Chest 1 View- PORTABLE-Urgent (05.05.24 @ 13:34) >  IMPRESSION:  No focal consolidations.

## 2024-05-06 NOTE — CONSULT NOTE ADULT - SUBJECTIVE AND OBJECTIVE BOX
Interventional Radiology    Evaluate for Procedure: LOWELL BLUNT     HPI:  72yo female, pmh HTN, T2DM on ozempic/insulin, CVA history w residual L-sided weakness, orthostatic hypotension, asthma presenting for fevers, chills, rigor, abdominal pain, nausea, and vomiting x 1 day duration. Patient had one episode of non-bloody/non-bilious emesis, was brought in by EMS when patient was found on floor by her health aide. Patient also reports recurrent falls recently, had a fall yesterday during the day, EMS was called, patient was evaluated, HD stable, patient had another fall again during the day, reported feeling dizzy prior to the fall. In ED, patient vitals were notable for hypotension 60/40, was given 2L IVF and subsequently started on levophed. Labs notable for WBC of 31, pH 7.22 with lactate 7.7, UA positive for bacteria. CT showing diffuse colitis, admitted to MICU for septic shock secondary to colitis vs UTI requiring pressors.  (05 May 2024 13:10)      PAST MEDICAL HISTORY:  73y    PAST SURGICAL HISTORY:  Female    MEDICATIONS:  No Known Allergies      ALLERGIES:    heparin   Injectable: 5000 Unit(s) SubCutaneous (05-05 @ 17:53)  metroNIDAZOLE  IVPB: 100 mL/Hr IV Intermittent (05-05 @ 15:20)  metroNIDAZOLE  IVPB: 100 mL/Hr IV Intermittent (05-05 @ 21:50)  norepinephrine Infusion: 7.03 mL/Hr IV Continuous (05-05 @ 14:15)  norepinephrine Infusion: 53.6 mL/Hr IV Continuous (05-06 @ 05:37)  piperacillin/tazobactam IVPB..: 25 mL/Hr IV Intermittent (05-06 @ 13:05)  piperacillin/tazobactam IVPB..: 25 mL/Hr IV Intermittent (05-05 @ 17:52)  piperacillin/tazobactam IVPB...: 200 mL/Hr IV Intermittent (05-05 @ 09:30)  vancomycin    Solution: 500 milliGRAM(s) Oral (05-05 @ 17:52)  vancomycin  IVPB: 250 mL/Hr IV Intermittent (05-06 @ 11:05)  vancomycin  IVPB.: 250 mL/Hr IV Intermittent (05-05 @ 11:52)      VITALS & I/Os:  Vital Signs Last 24 Hrs  T(C): 37.4 (06 May 2024 12:00), Max: 37.9 (06 May 2024 04:00)  T(F): 99.3 (06 May 2024 12:00), Max: 100.2 (06 May 2024 04:00)  HR: 100 (06 May 2024 13:15) (100 - 125)  BP: 133/67 (05 May 2024 14:45) (109/53 - 133/67)  BP(mean): 92 (05 May 2024 14:45) (77 - 92)  RR: 31 (06 May 2024 13:15) (16 - 33)  SpO2: 95% (06 May 2024 13:15) (89% - 100%)    Parameters below as of 06 May 2024 10:15  Patient On (Oxygen Delivery Method): ventilator    O2 Concentration (%): 40  Mode: AC/ CMV (Assist Control/ Continuous Mandatory Ventilation)  RR (machine): 30  TV (machine): 300  FiO2: 40  PEEP: 5  ITime: 1  MAP: 9  PIP: 19    I&O's Summary    05 May 2024 07:01  -  06 May 2024 07:00  --------------------------------------------------------  IN: 4663.3 mL / OUT: 1697 mL / NET: 2966.3 mL    06 May 2024 07:01  -  06 May 2024 13:48  --------------------------------------------------------  IN: 1286.3 mL / OUT: 1217 mL / NET: 69.3 mL        Allergies: No Known Allergies    Medications (Abx/Cardiac/Anticoagulation/Blood Products)  heparin   Injectable: 5000 Unit(s) SubCutaneous (05-05 @ 17:53)  metroNIDAZOLE  IVPB: 100 mL/Hr IV Intermittent (05-05 @ 15:20)  metroNIDAZOLE  IVPB: 100 mL/Hr IV Intermittent (05-05 @ 21:50)  norepinephrine Infusion: 7.03 mL/Hr IV Continuous (05-05 @ 14:15)  norepinephrine Infusion: 53.6 mL/Hr IV Continuous (05-06 @ 05:37)  piperacillin/tazobactam IVPB..: 25 mL/Hr IV Intermittent (05-05 @ 17:52)  piperacillin/tazobactam IVPB..: 25 mL/Hr IV Intermittent (05-06 @ 13:05)  piperacillin/tazobactam IVPB...: 200 mL/Hr IV Intermittent (05-05 @ 09:30)  vancomycin    Solution: 500 milliGRAM(s) Oral (05-05 @ 17:52)  vancomycin  IVPB: 250 mL/Hr IV Intermittent (05-06 @ 11:05)  vancomycin  IVPB.: 250 mL/Hr IV Intermittent (05-05 @ 11:52)    Data:  144.8  67.2  T(C): 37.4  HR: 100  BP: 133/67  RR: 31  SpO2: 95%    -WBC 47.10 / HgB 10.4 / Hct 29.7 / Plt 23  -Na 131 / Cl 93 / BUN 15 / Glucose 175  -K 5.2 / CO2 11 / Cr 0.93  -ALT 56 / Alk Phos 378 / T.Bili 4.7  -INR 1.89 / PTT --    Radiology:     Assessment/Plan:   72yo female, pmh HTN, T2DM on ozempic/insulin, CVA history w residual L-sided weakness, orthostatic hypotension, asthma presenting for fevers, chills, rigor, abdominal pain, nausea, and vomiting x 1 day duration. Patient had one episode of non-bloody/non-bilious emesis, was brought in by EMS when patient was found on floor by her health aide. Pt admitted for E. coli bacteremia.     IR cx for R. PCN i/s/o uretal inflammation causing obstruction. CT reviewed w/o calculi or RP mass, diffuse colitis s/p neg recto-sig scope. Rec ureteral stent placement for decompression, if fails, IR available for PCN placement. Pt currently intubated and high risk for proning. INR elevated to 1.9 plt 23. WBC >31k and lactate ~13.     - case reviewed w/Dr. Santana and Dr. Randall  - d/w Urology   Interventional Radiology    Evaluate for Procedure: LOWELL BLUNT     HPI:  72yo female, pmh HTN, T2DM on ozempic/insulin, CVA history w residual L-sided weakness, orthostatic hypotension, asthma presenting for fevers, chills, rigor, abdominal pain, nausea, and vomiting x 1 day duration. Patient had one episode of non-bloody/non-bilious emesis, was brought in by EMS when patient was found on floor by her health aide. Patient also reports recurrent falls recently, had a fall yesterday during the day, EMS was called, patient was evaluated, HD stable, patient had another fall again during the day, reported feeling dizzy prior to the fall. In ED, patient vitals were notable for hypotension 60/40, was given 2L IVF and subsequently started on levophed. Labs notable for WBC of 31, pH 7.22 with lactate 7.7, UA positive for bacteria. CT showing diffuse colitis, admitted to MICU for septic shock secondary to colitis vs UTI requiring pressors.  (05 May 2024 13:10)      PAST MEDICAL HISTORY:  73y    PAST SURGICAL HISTORY:  Female    MEDICATIONS:  No Known Allergies      ALLERGIES:    heparin   Injectable: 5000 Unit(s) SubCutaneous (05-05 @ 17:53)  metroNIDAZOLE  IVPB: 100 mL/Hr IV Intermittent (05-05 @ 15:20)  metroNIDAZOLE  IVPB: 100 mL/Hr IV Intermittent (05-05 @ 21:50)  norepinephrine Infusion: 7.03 mL/Hr IV Continuous (05-05 @ 14:15)  norepinephrine Infusion: 53.6 mL/Hr IV Continuous (05-06 @ 05:37)  piperacillin/tazobactam IVPB..: 25 mL/Hr IV Intermittent (05-06 @ 13:05)  piperacillin/tazobactam IVPB..: 25 mL/Hr IV Intermittent (05-05 @ 17:52)  piperacillin/tazobactam IVPB...: 200 mL/Hr IV Intermittent (05-05 @ 09:30)  vancomycin    Solution: 500 milliGRAM(s) Oral (05-05 @ 17:52)  vancomycin  IVPB: 250 mL/Hr IV Intermittent (05-06 @ 11:05)  vancomycin  IVPB.: 250 mL/Hr IV Intermittent (05-05 @ 11:52)      VITALS & I/Os:  Vital Signs Last 24 Hrs  T(C): 37.4 (06 May 2024 12:00), Max: 37.9 (06 May 2024 04:00)  T(F): 99.3 (06 May 2024 12:00), Max: 100.2 (06 May 2024 04:00)  HR: 100 (06 May 2024 13:15) (100 - 125)  BP: 133/67 (05 May 2024 14:45) (109/53 - 133/67)  BP(mean): 92 (05 May 2024 14:45) (77 - 92)  RR: 31 (06 May 2024 13:15) (16 - 33)  SpO2: 95% (06 May 2024 13:15) (89% - 100%)    Parameters below as of 06 May 2024 10:15  Patient On (Oxygen Delivery Method): ventilator    O2 Concentration (%): 40  Mode: AC/ CMV (Assist Control/ Continuous Mandatory Ventilation)  RR (machine): 30  TV (machine): 300  FiO2: 40  PEEP: 5  ITime: 1  MAP: 9  PIP: 19    I&O's Summary    05 May 2024 07:01  -  06 May 2024 07:00  --------------------------------------------------------  IN: 4663.3 mL / OUT: 1697 mL / NET: 2966.3 mL    06 May 2024 07:01  -  06 May 2024 13:48  --------------------------------------------------------  IN: 1286.3 mL / OUT: 1217 mL / NET: 69.3 mL        Allergies: No Known Allergies    Medications (Abx/Cardiac/Anticoagulation/Blood Products)  heparin   Injectable: 5000 Unit(s) SubCutaneous (05-05 @ 17:53)  metroNIDAZOLE  IVPB: 100 mL/Hr IV Intermittent (05-05 @ 15:20)  metroNIDAZOLE  IVPB: 100 mL/Hr IV Intermittent (05-05 @ 21:50)  norepinephrine Infusion: 7.03 mL/Hr IV Continuous (05-05 @ 14:15)  norepinephrine Infusion: 53.6 mL/Hr IV Continuous (05-06 @ 05:37)  piperacillin/tazobactam IVPB..: 25 mL/Hr IV Intermittent (05-05 @ 17:52)  piperacillin/tazobactam IVPB..: 25 mL/Hr IV Intermittent (05-06 @ 13:05)  piperacillin/tazobactam IVPB...: 200 mL/Hr IV Intermittent (05-05 @ 09:30)  vancomycin    Solution: 500 milliGRAM(s) Oral (05-05 @ 17:52)  vancomycin  IVPB: 250 mL/Hr IV Intermittent (05-06 @ 11:05)  vancomycin  IVPB.: 250 mL/Hr IV Intermittent (05-05 @ 11:52)    Data:  144.8  67.2  T(C): 37.4  HR: 100  BP: 133/67  RR: 31  SpO2: 95%    -WBC 47.10 / HgB 10.4 / Hct 29.7 / Plt 23  -Na 131 / Cl 93 / BUN 15 / Glucose 175  -K 5.2 / CO2 11 / Cr 0.93  -ALT 56 / Alk Phos 378 / T.Bili 4.7  -INR 1.89 / PTT --    Radiology:     Assessment/Plan:   72yo female, pmh HTN, T2DM on ozempic/insulin, CVA history w residual L-sided weakness, orthostatic hypotension, asthma presenting for fevers, chills, rigor, abdominal pain, nausea, and vomiting x 1 day duration. Patient had one episode of non-bloody/non-bilious emesis, was brought in by EMS when patient was found on floor by her health aide. Pt admitted for E. coli bacteremia. Pt currently receiving cont renal replacement therapy w/normalization of Cre     IR cx for R. PCN i/s/o uretal inflammation causing obstruction. CT reviewed w/o calculi or RP mass, diffuse colitis s/p neg recto-sig scope. Rec ureteral stent placement for decompression, if fails, IR available for PCN placement. Pt currently intubated and high risk for proning. INR elevated to 1.9 plt 23. WBC >31k and lactate ~13.     - case reviewed w/Dr. Santana and Dr. Randall  - d/w Urology   Interventional Radiology    Evaluate for Procedure: LOWELL BLUNT     HPI:  74yo female, pmh HTN, T2DM on ozempic/insulin, CVA history w residual L-sided weakness, orthostatic hypotension, asthma presenting for fevers, chills, rigor, abdominal pain, nausea, and vomiting x 1 day duration. Patient had one episode of non-bloody/non-bilious emesis, was brought in by EMS when patient was found on floor by her health aide. Patient also reports recurrent falls recently, had a fall yesterday during the day, EMS was called, patient was evaluated, HD stable, patient had another fall again during the day, reported feeling dizzy prior to the fall. In ED, patient vitals were notable for hypotension 60/40, was given 2L IVF and subsequently started on levophed. Labs notable for WBC of 31, pH 7.22 with lactate 7.7, UA positive for bacteria. CT showing diffuse colitis, admitted to MICU for septic shock secondary to colitis vs UTI requiring pressors.  (05 May 2024 13:10)      PAST MEDICAL HISTORY:  73y    PAST SURGICAL HISTORY:  Female    MEDICATIONS:  No Known Allergies      ALLERGIES:    heparin   Injectable: 5000 Unit(s) SubCutaneous (05-05 @ 17:53)  metroNIDAZOLE  IVPB: 100 mL/Hr IV Intermittent (05-05 @ 15:20)  metroNIDAZOLE  IVPB: 100 mL/Hr IV Intermittent (05-05 @ 21:50)  norepinephrine Infusion: 7.03 mL/Hr IV Continuous (05-05 @ 14:15)  norepinephrine Infusion: 53.6 mL/Hr IV Continuous (05-06 @ 05:37)  piperacillin/tazobactam IVPB..: 25 mL/Hr IV Intermittent (05-06 @ 13:05)  piperacillin/tazobactam IVPB..: 25 mL/Hr IV Intermittent (05-05 @ 17:52)  piperacillin/tazobactam IVPB...: 200 mL/Hr IV Intermittent (05-05 @ 09:30)  vancomycin    Solution: 500 milliGRAM(s) Oral (05-05 @ 17:52)  vancomycin  IVPB: 250 mL/Hr IV Intermittent (05-06 @ 11:05)  vancomycin  IVPB.: 250 mL/Hr IV Intermittent (05-05 @ 11:52)      VITALS & I/Os:  Vital Signs Last 24 Hrs  T(C): 37.4 (06 May 2024 12:00), Max: 37.9 (06 May 2024 04:00)  T(F): 99.3 (06 May 2024 12:00), Max: 100.2 (06 May 2024 04:00)  HR: 100 (06 May 2024 13:15) (100 - 125)  BP: 133/67 (05 May 2024 14:45) (109/53 - 133/67)  BP(mean): 92 (05 May 2024 14:45) (77 - 92)  RR: 31 (06 May 2024 13:15) (16 - 33)  SpO2: 95% (06 May 2024 13:15) (89% - 100%)    Parameters below as of 06 May 2024 10:15  Patient On (Oxygen Delivery Method): ventilator    O2 Concentration (%): 40  Mode: AC/ CMV (Assist Control/ Continuous Mandatory Ventilation)  RR (machine): 30  TV (machine): 300  FiO2: 40  PEEP: 5  ITime: 1  MAP: 9  PIP: 19    I&O's Summary    05 May 2024 07:01  -  06 May 2024 07:00  --------------------------------------------------------  IN: 4663.3 mL / OUT: 1697 mL / NET: 2966.3 mL    06 May 2024 07:01  -  06 May 2024 13:48  --------------------------------------------------------  IN: 1286.3 mL / OUT: 1217 mL / NET: 69.3 mL        Allergies: No Known Allergies    Medications (Abx/Cardiac/Anticoagulation/Blood Products)  heparin   Injectable: 5000 Unit(s) SubCutaneous (05-05 @ 17:53)  metroNIDAZOLE  IVPB: 100 mL/Hr IV Intermittent (05-05 @ 15:20)  metroNIDAZOLE  IVPB: 100 mL/Hr IV Intermittent (05-05 @ 21:50)  norepinephrine Infusion: 7.03 mL/Hr IV Continuous (05-05 @ 14:15)  norepinephrine Infusion: 53.6 mL/Hr IV Continuous (05-06 @ 05:37)  piperacillin/tazobactam IVPB..: 25 mL/Hr IV Intermittent (05-05 @ 17:52)  piperacillin/tazobactam IVPB..: 25 mL/Hr IV Intermittent (05-06 @ 13:05)  piperacillin/tazobactam IVPB...: 200 mL/Hr IV Intermittent (05-05 @ 09:30)  vancomycin    Solution: 500 milliGRAM(s) Oral (05-05 @ 17:52)  vancomycin  IVPB: 250 mL/Hr IV Intermittent (05-06 @ 11:05)  vancomycin  IVPB.: 250 mL/Hr IV Intermittent (05-05 @ 11:52)    Data:  144.8  67.2  T(C): 37.4  HR: 100  BP: 133/67  RR: 31  SpO2: 95%    -WBC 47.10 / HgB 10.4 / Hct 29.7 / Plt 23  -Na 131 / Cl 93 / BUN 15 / Glucose 175  -K 5.2 / CO2 11 / Cr 0.93  -ALT 56 / Alk Phos 378 / T.Bili 4.7  -INR 1.89 / PTT --    Radiology:     Assessment/Plan:   74yo female, pmh HTN, T2DM on ozempic/insulin, CVA history w residual L-sided weakness, orthostatic hypotension, asthma presenting for fevers, chills, rigor, abdominal pain, nausea, and vomiting x 1 day duration. Patient had one episode of non-bloody/non-bilious emesis, was brought in by EMS when patient was found on floor by her health aide. Pt admitted for E. coli bacteremia. Pt currently receiving cont renal replacement therapy w/normalization of Cre     IR cx for R. PCN i/s/o uretal inflammation causing obstruction. CT reviewed w/o calculi or RP mass, diffuse colitis s/p neg recto-sig scope.     - Rec ureteral stent placement for decompression, if fails, IR available for PCN placement. Pt currently intubated and high risk for prone positioning and coagulopathic; INR elevated to 1.9 plt 23.  - case reviewed w/Dr. Santana and Dr. Randall  - d/w Urology

## 2024-05-06 NOTE — DISCHARGE NOTE FOR THE EXPIRED PATIENT - HOSPITAL COURSE
Ms. Godoy is a 73 y.o. woman with PMHx HTN, T2DM on ozempic/insulin, CVA history w residual L-sided weakness, orthostatic hypotension, asthma who initially presented on 5/5 with fevers, chills, rigor, abdominal pain, nausea, and vomiting x 1 day duration. Patient had one episode of non-bloody/non-bilious emesis, was brought in by EMS when patient was found on floor by her health aide. In the ED, patient vitals were notable for hypotension 60/40, was given 2L IVF and subsequently started on levophed and vasopressin. Labs on presentation were notable for WBC of 31, pH 7.22 with lactate 7.7, with concern for GERALDINE 2/2 ATN. UA positive for bacteria. CT demonstrated diffuse colitis and R-sided hydroureteropnephrosis with diffuse bladder wall thickening vs underdistention.  Surgery was consulted to evaluate for ischemic colitis but concern was low and GI evaluation with colonoscopy was recommended. Nephrology was also consulted due to rapidly increasing lactate to 13 and anuria, for which bicarb gtt and CRRT were initiated. Pt was admitted to MICU on 5/5 for septic shock secondary to colitis vs UTI requiring pressors.     In the MICU, GI was consulted and flexible sigmoidoscopy was performed which showed normal mucosa of the distal colon. Pt was intubated prior to the procedure. Overnight from 5/5-5/6, pt remained on CRRT and was kept net even. During the morning of 5/6, all sedation was discontinued however pt remained non-responsive to pain or sternal rub with an absent gag reflex. Pt continued to decompensate with increasing pressor requirements, up-trending lactate and down-trending pH on ABG, although she was able to trigger her own breaths on the ventilator. Urology was consulted for possible intervention and ureteral stent for decompression was recommended. Risks of the procedure including the risk of further decompensation and death were discussed with family.    While in the OR, pt was being turned on the operating table when she became bradycardic and coded; Code Blue was called and pt underwent ~8 minutes of chest compressions receiving Epi x 4-5, lidocaine, multiple amps of sodium bicarb, insulin + D50 due to hypoglycemia 41 and K >7; lactate increased to >16. Epinephrine gtt was started. ROSC was achieved and pt was continued on epi gtt as well as levophed and vaso after being brought back to the MICU. Ureteral stent was placed successfully.     Following the procedure, pt continued to decompensate with K > 8 and further increasing pressor requirements. Discussion was had with family regarding risks of causing further hypotension if CRRT was restarted, as well as the likelihood that pt would code again. Pt remained hypotensive with MAP in 50s while on 3 IV pressors.   Daughter (HCP) agreed not to escalate care or perform chest compressions.     At 6:45PM 5/6, pt was noted to be in asystole on the monitor. Pupils were dilated, fixed, and non-reactive. Carotid pulses could not be palpated, and pt was without spontaneous breath or cardiac sounds.     Pt pronounced dead by Dr. Suzi Alonzo. Next of kin Miranda Cobian updated. Autopsy declined.    Ms. Godoy is a 73 y.o. woman with PMHx HTN, T2DM on ozempic/insulin, CVA history w residual L-sided weakness, orthostatic hypotension, asthma who initially presented on 5/5 with fevers, chills, rigor, abdominal pain, nausea, and vomiting x 1 day duration. Patient had one episode of non-bloody/non-bilious emesis, was brought in by EMS when patient was found on floor by her health aide. In the ED, patient vitals were notable for hypotension 60/40, was given 2L IVF and subsequently started on levophed and vasopressin. Labs on presentation were notable for WBC of 31, pH 7.22 with lactate 7.7, with concern for GERALDINE 2/2 ATN. UA positive for bacteria. CT demonstrated diffuse colitis and R-sided hydroureteronephrosis with diffuse bladder wall thickening vs underdistention.  Surgery was consulted to evaluate for ischemic colitis but concern was low and GI evaluation with colonoscopy was recommended. Nephrology was also consulted due to rapidly increasing lactate to 13 and anuria, for which bicarb gtt and CRRT were initiated. Pt was admitted to MICU on 5/5 for septic shock secondary to colitis vs UTI requiring pressors.     In the MICU, GI was consulted and flexible sigmoidoscopy was performed which showed normal mucosa of the distal colon. Pt was intubated prior to the procedure. Overnight from 5/5-5/6, pt remained on CRRT and was kept net even. During the morning of 5/6, all sedation was discontinued however pt remained non-responsive to pain or sternal rub with an absent gag reflex. Pt continued to decompensate with increasing pressor requirements, up-trending lactate and down-trending pH on ABG, although she was able to trigger her own breaths on the ventilator. Urology was consulted for possible intervention and ureteral stent for decompression was recommended. Risks of the procedure including the risk of further decompensation and death were discussed with family.    While in the OR, pt was being turned on the operating table when she became bradycardic and went into PEA arrest; pt underwent ~8 minutes of chest compressions receiving Epi x 4-5, lidocaine, multiple amps of sodium bicarb, insulin + D50 due to hypoglycemia 41 and K >7; lactate increased to >16. Epinephrine gtt was started. ROSC was achieved and pt was continued on epi gtt as well as levophed and vaso after being brought back to the MICU.  R ureteral stent was placed successfully.     Following the procedure, pt continued to decompensate with K > 8 and further increasing pressor requirements. Discussion was had with family regarding risks of causing further hypotension if CRRT was restarted, as well as the likelihood that pt would code again. Pt remained hypotensive with MAP in 50s while on 3 IV pressors.   Daughter (HCP) agreed not to escalate care or perform chest compressions.     At 6:45PM 5/6, pt was noted to be in asystole on the monitor. Pupils were dilated, fixed, and non-reactive. Carotid pulses could not be palpated, and pt was without spontaneous breath or cardiac sounds.     Pt pronounced dead by Dr. Suzi Alonzo. Next of kin Miranda Cobian updated. Autopsy declined.

## 2024-05-06 NOTE — CHART NOTE - NSCHARTNOTEFT_GEN_A_CORE
: Eladia Kothari    INDICATION: POCUS    PROCEDURE:  [x] LIMITED ECHO  [x] LIMITED CHEST  [ ] LIMITED RETROPERITONEAL  [ ] LIMITED ABDOMINAL  [ ] LIMITED DVT  [ ] NEEDLE GUIDANCE VASCULAR  [ ] NEEDLE GUIDANCE THORACENTESIS  [ ] NEEDLE GUIDANCE PARACENTESIS  [ ] NEEDLE GUIDANCE PERICARDIOCENTESIS  [ ] OTHER    FINDINGS: A line predominant b/l. B/L bases w/ no pleural effusion/consolidations. No wall motion abnormality, LV sys fx grossly normal.       INTERPRETATION: Lungs wnl and no gross cardiac limitations.

## 2024-05-06 NOTE — PROGRESS NOTE ADULT - SUBJECTIVE AND OBJECTIVE BOX
*******************************  Cayla Eagle MD (PGY-1)  Internal Medicine  Contact via [employer]  *******************************    INTERVAL HPI/OVERNIGHT EVENTS: Pt underwent flexible sigmoidoscopy with GI ON, was intubated for procedure. Remains intubated at this time.     SUBJECTIVE: Patient seen and examined at bedside.     MEDICATIONS  (STANDING):  buDESOnide    Inhalation Suspension 0.25 milliGRAM(s) Inhalation every 12 hours  chlorhexidine 0.12% Liquid 15 milliLiter(s) Oral Mucosa every 12 hours  chlorhexidine 4% Liquid 1 Application(s) Topical <User Schedule>  CRRT Treatment    <Continuous>  hydrocortisone sodium succinate Injectable 50 milliGRAM(s) IV Push every 6 hours  insulin lispro (ADMELOG) corrective regimen sliding scale   SubCutaneous every 6 hours  insulin NPH human recombinant 3 Unit(s) SubCutaneous every 6 hours  norepinephrine Infusion 0.85 MICROgram(s)/kG/Min (53.6 mL/Hr) IV Continuous <Continuous>  Phoxillum Filtration BK 4 / 2.5 5000 milliLiter(s) (200 mL/Hr) CRRT <Continuous>  Phoxillum Filtration BK 4 / 2.5 5000 milliLiter(s) (1300 mL/Hr) CRRT <Continuous>  piperacillin/tazobactam IVPB.. 3.375 Gram(s) IV Intermittent every 8 hours  PrismaSATE Dialysate BGK 4 / 2.5 5000 milliLiter(s) (1500 mL/Hr) CRRT <Continuous>  propofol Infusion 15 MICROgram(s)/kG/Min (6.05 mL/Hr) IV Continuous <Continuous>  sodium bicarbonate  Infusion 0.2 mEq/kG/Hr (100 mL/Hr) IV Continuous <Continuous>  vancomycin  IVPB 1000 milliGRAM(s) IV Intermittent every 24 hours  vasopressin Infusion 0.04 Unit(s)/Min (6 mL/Hr) IV Continuous <Continuous>    MEDICATIONS  (PRN):  albuterol/ipratropium for Nebulization 3 milliLiter(s) Nebulizer every 6 hours PRN Shortness of Breath and/or Wheezing  sodium chloride 0.9% lock flush 10 milliLiter(s) IV Push every 1 hour PRN Pre/post blood products, medications, blood draw, and to maintain line patency      ALLERGIES:  Allergies    No Known Allergies    Intolerances        VITAL SIGNS:  ICU Vital Signs Last 24 Hrs  T(C): 37.9 (06 May 2024 04:00), Max: 37.9 (06 May 2024 04:00)  T(F): 100.2 (06 May 2024 04:00), Max: 100.2 (06 May 2024 04:00)  HR: 104 (06 May 2024 07:15) (100 - 125)  BP: 133/67 (05 May 2024 14:45) (61/42 - 164/91)  BP(mean): 92 (05 May 2024 14:45) (74 - 92)  ABP: 104/39 (06 May 2024 07:15) (97/45 - 149/59)  ABP(mean): 62 (06 May 2024 07:15) (62 - 96)  RR: 30 (06 May 2024 07:15) (16 - 33)  SpO2: 96% (06 May 2024 07:15) (89% - 100%)    O2 Parameters below as of 06 May 2024 05:54  Patient On (Oxygen Delivery Method): ventilator        Mode: AC/ CMV (Assist Control/ Continuous Mandatory Ventilation), RR (machine): 30, TV (machine): 300, FiO2: 40, PEEP: 5, ITime: 1, MAP: 10, PIP: 21  Plateau pressure:   P/F ratio:     05-05 @ 07:01  -  05-06 @ 07:00  --------------------------------------------------------  IN: 4663.3 mL / OUT: 1697 mL / NET: 2966.3 mL      CAPILLARY BLOOD GLUCOSE      POCT Blood Glucose.: 184 mg/dL (06 May 2024 05:10)    ECG:    PHYSICAL EXAM:  GENERAL: NAD, intubated   HEAD:  Atraumatic, normocephalic  EYES: EOMI, PERRLA, conjunctiva and sclera clear  ENT: Moist mucous membranes  NECK: Supple, no JVD  HEART: S1, S2, regular rate and rhythm, no murmurs, rubs, or gallops  LUNGS: Unlabored respirations.  Clear to auscultation bilaterally, no crackles, wheezing, or rhonchi  ABDOMEN: Soft, nontender, nondistended, +BS  EXTREMITIES: 2+ peripheral pulses bilaterally. No clubbing, cyanosis, or edema  NERVOUS SYSTEM:  A&Ox3, no focal deficits   SKIN: No rashes or lesions  LINES:     LABS:                        9.9    31.23 )-----------( 37       ( 05 May 2024 23:09 )             29.1     05-06    134<L>  |  100  |  22  ----------------------------<  197<H>  4.8   |  13<L>  |  1.32<H>    Ca    7.8<L>      06 May 2024 05:11  Phos  3.4     05-06  Mg     2.3     05-06    TPro  5.1<L>  /  Alb  2.3<L>  /  TBili  5.1<H>  /  DBili  x   /  AST  78<H>  /  ALT  49<H>  /  AlkPhos  355<H>  05-06    PT/INR - ( 05 May 2024 23:08 )   PT: 20.4 sec;   INR: 1.89 ratio         PTT - ( 05 May 2024 15:52 )  PTT:34.8 sec  Urinalysis Basic - ( 06 May 2024 05:11 )    Color: x / Appearance: x / SG: x / pH: x  Gluc: 197 mg/dL / Ketone: x  / Bili: x / Urobili: x   Blood: x / Protein: x / Nitrite: x   Leuk Esterase: x / RBC: x / WBC x   Sq Epi: x / Non Sq Epi: x / Bacteria: x        RADIOLOGY & ADDITIONAL TESTS:  *******************************  Cayla Eagle MD (PGY-1)  Internal Medicine  Contact via [employer] TEAMS  *******************************    INTERVAL HPI/OVERNIGHT EVENTS: Pt underwent flexible sigmoidoscopy with GI ON, was intubated for procedure. Remains intubated at this time. CRRT initiated at 11PM, net even ON.     SUBJECTIVE: Patient seen and examined at bedside. Examined off sedation, AOx0, non-responsive to pain. Remains intubated, triggering breaths on vent.     MEDICATIONS  (STANDING):  buDESOnide    Inhalation Suspension 0.25 milliGRAM(s) Inhalation every 12 hours  chlorhexidine 0.12% Liquid 15 milliLiter(s) Oral Mucosa every 12 hours  chlorhexidine 4% Liquid 1 Application(s) Topical <User Schedule>  CRRT Treatment    <Continuous>  hydrocortisone sodium succinate Injectable 50 milliGRAM(s) IV Push every 6 hours  insulin lispro (ADMELOG) corrective regimen sliding scale   SubCutaneous every 6 hours  insulin NPH human recombinant 3 Unit(s) SubCutaneous every 6 hours  norepinephrine Infusion 0.85 MICROgram(s)/kG/Min (53.6 mL/Hr) IV Continuous <Continuous>  pantoprazole  Injectable 40 milliGRAM(s) IV Push daily  Phoxillum Filtration BK 4 / 2.5 5000 milliLiter(s) (200 mL/Hr) CRRT <Continuous>  piperacillin/tazobactam IVPB.. 3.375 Gram(s) IV Intermittent every 8 hours  PrismaSATE Dialysate BK 0 / 3.5 5000 milliLiter(s) (1500 mL/Hr) CRRT <Continuous>  PrismaSOL Filtration BGK 0 / 2.5 5000 milliLiter(s) (1300 mL/Hr) CRRT <Continuous>  propofol Infusion 15 MICROgram(s)/kG/Min (6.05 mL/Hr) IV Continuous <Continuous>  sodium bicarbonate  Infusion 0.2 mEq/kG/Hr (100 mL/Hr) IV Continuous <Continuous>  vancomycin  IVPB 1000 milliGRAM(s) IV Intermittent every 24 hours  vasopressin Infusion 0.04 Unit(s)/Min (6 mL/Hr) IV Continuous <Continuous>    MEDICATIONS  (PRN):  albuterol/ipratropium for Nebulization 3 milliLiter(s) Nebulizer every 6 hours PRN Shortness of Breath and/or Wheezing  sodium chloride 0.9% lock flush 10 milliLiter(s) IV Push every 1 hour PRN Pre/post blood products, medications, blood draw, and to maintain line patency      ALLERGIES:  Allergies    No Known Allergies    Intolerances      VITAL SIGNS:  ICU Vital Signs Last 24 Hrs  T(C): 37.9 (06 May 2024 04:00), Max: 37.9 (06 May 2024 04:00)  T(F): 100.2 (06 May 2024 04:00), Max: 100.2 (06 May 2024 04:00)  HR: 104 (06 May 2024 07:15) (100 - 125)  BP: 133/67 (05 May 2024 14:45) (61/42 - 164/91)  BP(mean): 92 (05 May 2024 14:45) (74 - 92)  ABP: 104/39 (06 May 2024 07:15) (97/45 - 149/59)  ABP(mean): 62 (06 May 2024 07:15) (62 - 96)  RR: 30 (06 May 2024 07:15) (16 - 33)  SpO2: 96% (06 May 2024 07:15) (89% - 100%)    O2 Parameters below as of 06 May 2024 05:54  Patient On (Oxygen Delivery Method): ventilator      Mode: AC/ CMV (Assist Control/ Continuous Mandatory Ventilation), RR (machine): 30, TV (machine): 300, FiO2: 40, PEEP: 5, ITime: 1, MAP: 10, PIP: 21  Plateau pressure:   P/F ratio:     05-05 @ 07:01  -  05-06 @ 07:00  --------------------------------------------------------  IN: 4663.3 mL / OUT: 1697 mL / NET: 2966.3 mL      CAPILLARY BLOOD GLUCOSE    POCT Blood Glucose.: 145 mg/dL (06 May 2024 08:10)  POCT Blood Glucose.: 184 mg/dL (06 May 2024 05:10)  POCT Blood Glucose.: 248 mg/dL (06 May 2024 00:30)  POCT Blood Glucose.: 296 mg/dL (05 May 2024 21:45)  POCT Blood Glucose.: 234 mg/dL (05 May 2024 18:00)      PHYSICAL EXAM:    GENERAL: NAD, intubated and off sedation, appears comfortable.  HEAD:  Atraumatic, normocephalic  EYES: Unable to assess EOMI, pupils sluggish. Conjunctiva and sclera clear  ENT: Moist mucous membranes.  NECK: Supple, no JVD  HEART: S1, S2, regular rate and rhythm, no murmurs, rubs, or gallops  LUNGS: Unlabored respirations.  Clear to auscultation bilaterally, no crackles, wheezing, or rhonchi  ABDOMEN: Soft, nontender, nondistended, +BS  EXTREMITIES: 2+ peripheral pulses bilaterally. No clubbing, cyanosis, or edema  NERVOUS SYSTEM:  A&Ox0, unable to assess focal deficits. Gag reflex absent.   SKIN: No rashes or lesions  LINES: TANVIR Camacho    LABS:                        9.9    31.23 )-----------( 37       ( 05 May 2024 23:09 )             29.1     05-06    134<L>  |  100  |  22  ----------------------------<  197<H>  4.8   |  13<L>  |  1.32<H>    Ca    7.8<L>      06 May 2024 05:11  Phos  3.4     05-06  Mg     2.3     05-06    TPro  5.1<L>  /  Alb  2.3<L>  /  TBili  5.1<H>  /  DBili  x   /  AST  78<H>  /  ALT  49<H>  /  AlkPhos  355<H>  05-06    PT/INR - ( 05 May 2024 23:08 )   PT: 20.4 sec;   INR: 1.89 ratio    PTT - ( 05 May 2024 15:52 )  PTT:34.8 sec    Urinalysis Basic - ( 06 May 2024 05:11 )    Color: x / Appearance: x / SG: x / pH: x  Gluc: 197 mg/dL / Ketone: x  / Bili: x / Urobili: x   Blood: x / Protein: x / Nitrite: x   Leuk Esterase: x / RBC: x / WBC x   Sq Epi: x / Non Sq Epi: x / Bacteria: x    Blood Gas Profile - Arterial (05.06.24 @ 09:49)   pH, Arterial: 7.19  pCO2, Arterial: 33 mmHg  pO2, Arterial: 86 mmHg  HCO3, Arterial: 13 mmol/L  Base Excess, Arterial: -14.5 mmol/L  Oxygen Saturation, Arterial: 97.2 %  Total CO2, Arterial: 14 mmol/L  FIO2, Arterial: 40  Blood Gas Source Arterial: ArterialBlood Gas Arterial, Lactate (05.06.24 @ 09:49)   Blood Gas Arterial, Lactate: 12.5 mmol/L    RADIOLOGY & ADDITIONAL TESTS:     < from: CT Head No Cont (05.05.24 @ 10:22) >    IMPRESSION:  No acute intracranial hemorrhage or acute territorial infarct.  If   symptoms persist, follow-up MRI exam recommended.    < end of copied text >  < from: CT Abdomen and Pelvis w/ IV Cont (05.05.24 @ 10:22) >    FINDINGS:  LOWER CHEST: Scattered bilateral subsegmental atelectasis. Coronary   artery and aortic calcifications.. Mitral annular calcifications.    LIVER: Steatosis. Hepatomegaly. There is a 2.1 x 1.8 cm lesion with   peripheral hyperenhancement within the dome of the RIGHT lobe of the   liver (301-15). Ill-defined low density region anterior and superior to   this (301-13) is also indeterminate.  BILE DUCTS: Normal caliber.  GALLBLADDER: Cholecystectomy.  SPLEEN: Within normal limits.  PANCREAS: Within normal limits.  ADRENALS: Within normal limits.  KIDNEYS/URETERS: Mild right hydroureteronephrosis. No nephrolithiasis. No   left hydronephrosis.    BLADDER: Diffuse bladder wall thickening versus underdistention.  REPRODUCTIVE ORGANS: Uterus and adnexa within normal limits.    BOWEL: No bowel obstruction. Appendix is normal. There is diffuse   thickening of nearly the entirety of the colon with relative sparing of   the cecum and the rectum distal to the anastomosis. Findings are   consistent with a diffuse colitis.    PERITONEUM: No ascites.  VESSELS: Atherosclerotic changes.  RETROPERITONEUM/LYMPH NODES: There are prominent rounded slightly   hyperenhancing abnormal-appearing para-aortic lymph nodes. For example,   there is a rounded hyperenhancing node measuring 13 x 12 mm (301-57)   anterior to the aorta.  ABDOMINAL WALL: Small fat-containing umbilical hernia. Fat containing   RIGHT spigelian hernia.  BONES: Within normal limits.    IMPRESSION:  1.  Mild right hydroureteronephrosis with diffuse bladder wall thickening   versus underdistention. Correlate with urinalysis for possible ascending   urinary tract infection.  2.  Multiple ill-defined low density lesions within the RIGHT lobe of the   liver. Differential diagnosis includes metastases, infection/developing   abscess, or atypical masslike focal steato    < end of copied text >

## 2024-05-06 NOTE — CONSULT NOTE ADULT - ATTENDING COMMENTS
73-yo F w/ PMH of T2DM, CVA w/ L-side weakness, HTN, and s/p cholecystectomy, admitted with fever, chills, nausea, vomiting, and abdominal pain. Significant leukocytosis and bandemia noted on admission. Also with LFT elevation and hyperbilirubinemia. CTAP w/ 2.1 x 1.8 cm lesion w/ peripheral hyperenhancement within the dome of the RIGHT lobe of the liver with other hypodense regions, mild R hydro with bladder wall thickening, and nonspecific colitis. CT head negative for acute findings and CXR showing no consolidation. BCx 5/5 w/ E coli and MRSE (1 bottle).      #Septic shock  #E coli bacteremia  #Abnormal CT scan  #GERALDINE  #Leukocytosis  #Acute hypoxic respiratory failure  - Admitted with shock state, now intubated. E coli bacteremia noted. MRSE likely contamination.  - Repeat BCx x2 sets  - Per [place] criteria, patient is high risk of suspected cholangitis despite negative imaging. Note high elevation of alk phos and bilirubin. Recommend MRCP  - Continue with Zosyn 3.375 g IV Q8H  - Favor d/c vancomycin  - Dose meds per CRRT  - Can send echinococcus study and leptospirosis studies    Plan discussed with primary team house staff.  Thank you for this consult. Inpatient ID team will follow.    Driss Reyes MD, PhD  Attending Physician  Division of Infectious Diseases  Department of Medicine    Please contact through [employer].  Office: [phone] (after 5 PM or weekend) 73-yo F w/ PMH of T2DM, CVA w/ L-side weakness, HTN, and s/p cholecystectomy, admitted with fever, chills, nausea, vomiting, and abdominal pain. Significant leukocytosis and bandemia noted on admission. Also with LFT elevation and hyperbilirubinemia. CTAP w/ 2.1 x 1.8 cm lesion w/ peripheral hyperenhancement within the dome of the RIGHT lobe of the liver with other hypodense regions, mild R hydro with bladder wall thickening, and nonspecific colitis. CT head negative for acute findings and CXR showing no consolidation. BCx 5/5 w/ E coli and MRSE (1 bottle).      #Septic shock  #E coli bacteremia  #Abnormal CT scan  #GERALDINE  #Leukocytosis  #Acute hypoxic respiratory failure  - Admitted with shock state, now intubated. E coli bacteremia noted. MRSE likely contamination.  - Repeat BCx x2 sets  - Per [place] criteria, patient is high risk of suspected cholangitis despite negative imaging. Note high elevation of alk phos and bilirubin. Recommend MRCP  - Continue with Zosyn 3.375 g IV Q8H  - F/u urology. Pending OR for stent  - Favor d/c vancomycin  - Dose meds per CRRT  - Can send echinococcus study and leptospirosis studies    Plan discussed with primary team house staff.  Thank you for this consult. Inpatient ID team will follow.    Driss Reyes MD, PhD  Attending Physician  Division of Infectious Diseases  Department of Medicine    Please contact through MS Teams message.  Office: [phone] (after 5 PM or weekend)

## 2024-05-06 NOTE — PROGRESS NOTE ADULT - SUBJECTIVE AND OBJECTIVE BOX
Urology Brief Note    Consulted on this very ill 72yo F who was brought to hospital yesterday morning around 9am for hypotension in the 60s and altered mental status. She was resuscitated in ER, found to have very high lactate and CT imaging showing liver lesions, mild right hydronephrosis to level of bladder, and possible evidence of colitis. She was admitted to the MICU and started on broad spectrum abx. SHe underwent flexible sigmoidoscopy by GI and was later intubated. Blanco was placed and blood cultures are already growing E. Coli.      team was consulted on this patient this afternoon and I was informed of consult at 1pm. Although there was no stone visualized or obvious source of  tract obstruction, given her severe septic shock with E. Coli bacteremia I recommended renal drainage. Interventional radiology was consulted who felt she was too high risk for nephrostomy tube placement given the combination of her mechanical intubation, elevated INR and thrombocytopenia and after discussion with them decision was made to book patient for emergent cystoscopy with ureteral stent placement. Case was booked just after 2pm for 330pm start time given the patient needed to be prepped and brought from ICU.

## 2024-05-06 NOTE — CONSULT NOTE ADULT - PROVIDER SPECIALTY LIST ADULT
Surgery
Urology
Nephrology
Intervent Radiology
Gastroenterology
Infectious Disease
Urology
[phone]

## 2024-05-07 LAB
-  AMOXICILLIN/CLAVULANIC ACID: SIGNIFICANT CHANGE UP
-  AMPICILLIN/SULBACTAM: SIGNIFICANT CHANGE UP
-  AMPICILLIN/SULBACTAM: SIGNIFICANT CHANGE UP
-  AMPICILLIN: SIGNIFICANT CHANGE UP
-  AMPICILLIN: SIGNIFICANT CHANGE UP
-  AZTREONAM: SIGNIFICANT CHANGE UP
-  AZTREONAM: SIGNIFICANT CHANGE UP
-  CEFAZOLIN: SIGNIFICANT CHANGE UP
-  CEFAZOLIN: SIGNIFICANT CHANGE UP
-  CEFEPIME: SIGNIFICANT CHANGE UP
-  CEFEPIME: SIGNIFICANT CHANGE UP
-  CEFOXITIN: SIGNIFICANT CHANGE UP
-  CEFOXITIN: SIGNIFICANT CHANGE UP
-  CEFTRIAXONE: SIGNIFICANT CHANGE UP
-  CEFTRIAXONE: SIGNIFICANT CHANGE UP
-  CEFUROXIME: SIGNIFICANT CHANGE UP
-  CIPROFLOXACIN: SIGNIFICANT CHANGE UP
-  CIPROFLOXACIN: SIGNIFICANT CHANGE UP
-  ERTAPENEM: SIGNIFICANT CHANGE UP
-  ERTAPENEM: SIGNIFICANT CHANGE UP
-  GENTAMICIN: SIGNIFICANT CHANGE UP
-  GENTAMICIN: SIGNIFICANT CHANGE UP
-  IMIPENEM: SIGNIFICANT CHANGE UP
-  IMIPENEM: SIGNIFICANT CHANGE UP
-  LEVOFLOXACIN: SIGNIFICANT CHANGE UP
-  LEVOFLOXACIN: SIGNIFICANT CHANGE UP
-  MEROPENEM: SIGNIFICANT CHANGE UP
-  MEROPENEM: SIGNIFICANT CHANGE UP
-  NITROFURANTOIN: SIGNIFICANT CHANGE UP
-  PIPERACILLIN/TAZOBACTAM: SIGNIFICANT CHANGE UP
-  PIPERACILLIN/TAZOBACTAM: SIGNIFICANT CHANGE UP
-  TOBRAMYCIN: SIGNIFICANT CHANGE UP
-  TOBRAMYCIN: SIGNIFICANT CHANGE UP
-  TRIMETHOPRIM/SULFAMETHOXAZOLE: SIGNIFICANT CHANGE UP
-  TRIMETHOPRIM/SULFAMETHOXAZOLE: SIGNIFICANT CHANGE UP
CULTURE RESULTS: ABNORMAL
CULTURE RESULTS: ABNORMAL
METHOD TYPE: SIGNIFICANT CHANGE UP
METHOD TYPE: SIGNIFICANT CHANGE UP
ORGANISM # SPEC MICROSCOPIC CNT: ABNORMAL
SPECIMEN SOURCE: SIGNIFICANT CHANGE UP
SPECIMEN SOURCE: SIGNIFICANT CHANGE UP

## 2024-05-10 LAB
CULTURE RESULTS: SIGNIFICANT CHANGE UP
E HISTOLYT AB SER-ACNC: NEGATIVE — SIGNIFICANT CHANGE UP
ECHINOCOCCUS AB TITR SER: POSITIVE
ECHINOCOCCUS AB TITR SER: POSITIVE
SPECIMEN SOURCE: SIGNIFICANT CHANGE UP

## 2024-06-01 PROBLEM — E78.5 HYPERLIPIDEMIA, UNSPECIFIED: Chronic | Status: ACTIVE | Noted: 2019-07-10

## 2024-06-01 PROBLEM — I63.9 CEREBRAL INFARCTION, UNSPECIFIED: Chronic | Status: ACTIVE | Noted: 2019-07-10

## 2024-06-01 PROBLEM — J45.909 UNSPECIFIED ASTHMA, UNCOMPLICATED: Chronic | Status: ACTIVE | Noted: 2019-07-10

## 2024-06-01 RX ORDER — SERTRALINE 25 MG/1
1 TABLET, FILM COATED ORAL
Refills: 0 | DISCHARGE

## 2024-06-01 RX ORDER — ATORVASTATIN CALCIUM 80 MG/1
1 TABLET, FILM COATED ORAL
Refills: 0 | DISCHARGE

## 2024-06-01 RX ORDER — FUROSEMIDE 40 MG
1 TABLET ORAL
Refills: 0 | DISCHARGE

## 2024-06-01 RX ORDER — INSULIN GLARGINE 100 [IU]/ML
20 INJECTION, SOLUTION SUBCUTANEOUS
Refills: 0 | DISCHARGE

## 2024-06-01 RX ORDER — LISINOPRIL 2.5 MG/1
1 TABLET ORAL
Refills: 0 | DISCHARGE

## 2024-06-01 RX ORDER — MOMETASONE FUROATE AND FORMOTEROL FUMARATE DIHYDRATE 200; 5 UG/1; UG/1
2 AEROSOL RESPIRATORY (INHALATION)
Refills: 0 | DISCHARGE

## 2024-06-01 RX ORDER — AMLODIPINE BESYLATE 2.5 MG/1
1 TABLET ORAL
Refills: 0 | DISCHARGE

## 2024-06-01 RX ORDER — ASPIRIN/CALCIUM CARB/MAGNESIUM 324 MG
1 TABLET ORAL
Refills: 0 | DISCHARGE

## 2024-06-01 RX ORDER — SEMAGLUTIDE 0.68 MG/ML
0.5 INJECTION, SOLUTION SUBCUTANEOUS
Refills: 0 | DISCHARGE
